# Patient Record
Sex: FEMALE | Race: OTHER | HISPANIC OR LATINO
[De-identification: names, ages, dates, MRNs, and addresses within clinical notes are randomized per-mention and may not be internally consistent; named-entity substitution may affect disease eponyms.]

---

## 2018-05-15 ENCOUNTER — RESULT REVIEW (OUTPATIENT)
Age: 48
End: 2018-05-15

## 2018-05-26 ENCOUNTER — RESULT REVIEW (OUTPATIENT)
Age: 48
End: 2018-05-26

## 2018-06-16 ENCOUNTER — RESULT REVIEW (OUTPATIENT)
Age: 48
End: 2018-06-16

## 2018-07-01 ENCOUNTER — INPATIENT (INPATIENT)
Facility: HOSPITAL | Age: 48
LOS: 2 days | Discharge: HOME HEALTH | End: 2018-07-04
Attending: INTERNAL MEDICINE | Admitting: INTERNAL MEDICINE
Payer: COMMERCIAL

## 2018-07-01 ENCOUNTER — TRANSCRIPTION ENCOUNTER (OUTPATIENT)
Age: 48
End: 2018-07-01

## 2018-07-01 VITALS
HEIGHT: 63 IN | RESPIRATION RATE: 17 BRPM | OXYGEN SATURATION: 100 % | WEIGHT: 160.06 LBS | TEMPERATURE: 98 F | HEART RATE: 77 BPM | DIASTOLIC BLOOD PRESSURE: 67 MMHG | SYSTOLIC BLOOD PRESSURE: 138 MMHG

## 2018-07-01 PROBLEM — Z00.00 ENCOUNTER FOR PREVENTIVE HEALTH EXAMINATION: Status: ACTIVE | Noted: 2018-07-01

## 2018-07-01 PROCEDURE — 99285 EMERGENCY DEPT VISIT HI MDM: CPT | Mod: 25

## 2018-07-01 RX ORDER — SODIUM CHLORIDE 9 MG/ML
1000 INJECTION INTRAMUSCULAR; INTRAVENOUS; SUBCUTANEOUS ONCE
Qty: 0 | Refills: 0 | Status: COMPLETED | OUTPATIENT
Start: 2018-07-01 | End: 2018-07-01

## 2018-07-01 NOTE — ED ADULT TRIAGE NOTE - CHIEF COMPLAINT QUOTE
"We were at a barbecue, I went to the bathroom and they said that I passed out, I don't remember what happened, they said that I hit my head but my head does not hurt but my foot does"

## 2018-07-01 NOTE — ED ADULT NURSE NOTE - OBJECTIVE STATEMENT
48yr old female present to ER 2/2 syncope.  Patient state "We were at a barbecue, I went to the bathroom and they said that I passed out, and I could'nt remember anything. now c/o of ankle pain

## 2018-07-02 DIAGNOSIS — S82.864A NONDISPLACED MAISONNEUVE'S FRACTURE OF RIGHT LEG, INITIAL ENCOUNTER FOR CLOSED FRACTURE: ICD-10-CM

## 2018-07-02 DIAGNOSIS — D86.9 SARCOIDOSIS, UNSPECIFIED: ICD-10-CM

## 2018-07-02 DIAGNOSIS — Z29.9 ENCOUNTER FOR PROPHYLACTIC MEASURES, UNSPECIFIED: ICD-10-CM

## 2018-07-02 DIAGNOSIS — Z98.890 OTHER SPECIFIED POSTPROCEDURAL STATES: Chronic | ICD-10-CM

## 2018-07-02 DIAGNOSIS — R55 SYNCOPE AND COLLAPSE: ICD-10-CM

## 2018-07-02 DIAGNOSIS — D50.9 IRON DEFICIENCY ANEMIA, UNSPECIFIED: ICD-10-CM

## 2018-07-02 LAB
ABO RH CONFIRMATION: SIGNIFICANT CHANGE UP
ALBUMIN SERPL ELPH-MCNC: 3.3 G/DL — SIGNIFICANT CHANGE UP (ref 3.3–5)
ALBUMIN SERPL ELPH-MCNC: 3.7 G/DL — SIGNIFICANT CHANGE UP (ref 3.3–5)
ALP SERPL-CCNC: 81 U/L — SIGNIFICANT CHANGE UP (ref 40–120)
ALP SERPL-CCNC: 94 U/L — SIGNIFICANT CHANGE UP (ref 40–120)
ALT FLD-CCNC: 14 U/L — SIGNIFICANT CHANGE UP (ref 12–78)
ALT FLD-CCNC: 17 U/L — SIGNIFICANT CHANGE UP (ref 12–78)
ANION GAP SERPL CALC-SCNC: 10 MMOL/L — SIGNIFICANT CHANGE UP (ref 5–17)
ANION GAP SERPL CALC-SCNC: 7 MMOL/L — SIGNIFICANT CHANGE UP (ref 5–17)
ANION GAP SERPL CALC-SCNC: 9 MMOL/L — SIGNIFICANT CHANGE UP (ref 5–17)
ANISOCYTOSIS BLD QL: SLIGHT — SIGNIFICANT CHANGE UP
APPEARANCE UR: ABNORMAL
APTT BLD: 26.1 SEC — LOW (ref 27.5–37.4)
APTT BLD: 26.8 SEC — LOW (ref 27.5–37.4)
AST SERPL-CCNC: 17 U/L — SIGNIFICANT CHANGE UP (ref 15–37)
AST SERPL-CCNC: 19 U/L — SIGNIFICANT CHANGE UP (ref 15–37)
BACTERIA # UR AUTO: ABNORMAL
BASOPHILS # BLD AUTO: 0 K/UL — SIGNIFICANT CHANGE UP (ref 0–0.2)
BASOPHILS # BLD AUTO: 0.02 K/UL — SIGNIFICANT CHANGE UP (ref 0–0.2)
BASOPHILS NFR BLD AUTO: 0 % — SIGNIFICANT CHANGE UP (ref 0–2)
BASOPHILS NFR BLD AUTO: 0.2 % — SIGNIFICANT CHANGE UP (ref 0–2)
BILIRUB SERPL-MCNC: 0.3 MG/DL — SIGNIFICANT CHANGE UP (ref 0.2–1.2)
BILIRUB SERPL-MCNC: 0.4 MG/DL — SIGNIFICANT CHANGE UP (ref 0.2–1.2)
BILIRUB UR-MCNC: NEGATIVE — SIGNIFICANT CHANGE UP
BLD GP AB SCN SERPL QL: SIGNIFICANT CHANGE UP
BUN SERPL-MCNC: 11 MG/DL — SIGNIFICANT CHANGE UP (ref 7–23)
CALCIUM SERPL-MCNC: 8.2 MG/DL — LOW (ref 8.5–10.1)
CALCIUM SERPL-MCNC: 8.2 MG/DL — LOW (ref 8.5–10.1)
CALCIUM SERPL-MCNC: 9 MG/DL — SIGNIFICANT CHANGE UP (ref 8.5–10.1)
CHLORIDE SERPL-SCNC: 110 MMOL/L — HIGH (ref 96–108)
CHLORIDE SERPL-SCNC: 113 MMOL/L — HIGH (ref 96–108)
CHLORIDE SERPL-SCNC: 115 MMOL/L — HIGH (ref 96–108)
CK MB BLD-MCNC: 0.8 % — SIGNIFICANT CHANGE UP (ref 0–3.5)
CK MB BLD-MCNC: 0.8 % — SIGNIFICANT CHANGE UP (ref 0–3.5)
CK MB CFR SERPL CALC: 0.9 NG/ML — SIGNIFICANT CHANGE UP (ref 0.5–3.6)
CK MB CFR SERPL CALC: 1.1 NG/ML — SIGNIFICANT CHANGE UP (ref 0.5–3.6)
CK MB CFR SERPL CALC: 1.1 NG/ML — SIGNIFICANT CHANGE UP (ref 0.5–3.6)
CK SERPL-CCNC: 137 U/L — SIGNIFICANT CHANGE UP (ref 26–192)
CK SERPL-CCNC: 142 U/L — SIGNIFICANT CHANGE UP (ref 26–192)
CO2 SERPL-SCNC: 22 MMOL/L — SIGNIFICANT CHANGE UP (ref 22–31)
CO2 SERPL-SCNC: 22 MMOL/L — SIGNIFICANT CHANGE UP (ref 22–31)
CO2 SERPL-SCNC: 23 MMOL/L — SIGNIFICANT CHANGE UP (ref 22–31)
COLOR SPEC: YELLOW — SIGNIFICANT CHANGE UP
CREAT SERPL-MCNC: 0.84 MG/DL — SIGNIFICANT CHANGE UP (ref 0.5–1.3)
CREAT SERPL-MCNC: 1.02 MG/DL — SIGNIFICANT CHANGE UP (ref 0.5–1.3)
CREAT SERPL-MCNC: 1.16 MG/DL — SIGNIFICANT CHANGE UP (ref 0.5–1.3)
CRP SERPL-MCNC: 0.2 MG/DL — SIGNIFICANT CHANGE UP (ref 0–0.4)
DIFF PNL FLD: NEGATIVE — SIGNIFICANT CHANGE UP
EOSINOPHIL # BLD AUTO: 0 K/UL — SIGNIFICANT CHANGE UP (ref 0–0.5)
EOSINOPHIL # BLD AUTO: 0.03 K/UL — SIGNIFICANT CHANGE UP (ref 0–0.5)
EOSINOPHIL NFR BLD AUTO: 0 % — SIGNIFICANT CHANGE UP (ref 0–6)
EOSINOPHIL NFR BLD AUTO: 0.3 % — SIGNIFICANT CHANGE UP (ref 0–6)
EPI CELLS # UR: SIGNIFICANT CHANGE UP
GLUCOSE SERPL-MCNC: 109 MG/DL — HIGH (ref 70–99)
GLUCOSE SERPL-MCNC: 94 MG/DL — SIGNIFICANT CHANGE UP (ref 70–99)
GLUCOSE SERPL-MCNC: 96 MG/DL — SIGNIFICANT CHANGE UP (ref 70–99)
GLUCOSE UR QL: NEGATIVE MG/DL — SIGNIFICANT CHANGE UP
HCG SERPL-ACNC: <1 MIU/ML — SIGNIFICANT CHANGE UP
HCT VFR BLD CALC: 31.2 % — LOW (ref 34.5–45)
HCT VFR BLD CALC: 31.8 % — LOW (ref 34.5–45)
HCT VFR BLD CALC: 34.3 % — LOW (ref 34.5–45)
HGB BLD-MCNC: 8.6 G/DL — LOW (ref 11.5–15.5)
HGB BLD-MCNC: 8.7 G/DL — LOW (ref 11.5–15.5)
HGB BLD-MCNC: 9.5 G/DL — LOW (ref 11.5–15.5)
HYPOCHROMIA BLD QL: SLIGHT — SIGNIFICANT CHANGE UP
IMM GRANULOCYTES NFR BLD AUTO: 0.2 % — SIGNIFICANT CHANGE UP (ref 0–1.5)
INR BLD: 1.04 RATIO — SIGNIFICANT CHANGE UP (ref 0.88–1.16)
INR BLD: 1.09 RATIO — SIGNIFICANT CHANGE UP (ref 0.88–1.16)
KETONES UR-MCNC: NEGATIVE — SIGNIFICANT CHANGE UP
LEUKOCYTE ESTERASE UR-ACNC: ABNORMAL
LYMPHOCYTES # BLD AUTO: 1.01 K/UL — SIGNIFICANT CHANGE UP (ref 1–3.3)
LYMPHOCYTES # BLD AUTO: 10 % — LOW (ref 13–44)
LYMPHOCYTES # BLD AUTO: 2.13 K/UL — SIGNIFICANT CHANGE UP (ref 1–3.3)
LYMPHOCYTES # BLD AUTO: 23.7 % — SIGNIFICANT CHANGE UP (ref 13–44)
MAGNESIUM SERPL-MCNC: 2.3 MG/DL — SIGNIFICANT CHANGE UP (ref 1.6–2.6)
MANUAL SMEAR VERIFICATION: YES — SIGNIFICANT CHANGE UP
MCHC RBC-ENTMCNC: 20 PG — LOW (ref 27–34)
MCHC RBC-ENTMCNC: 20 PG — LOW (ref 27–34)
MCHC RBC-ENTMCNC: 20.1 PG — LOW (ref 27–34)
MCHC RBC-ENTMCNC: 27.4 GM/DL — LOW (ref 32–36)
MCHC RBC-ENTMCNC: 27.6 GM/DL — LOW (ref 32–36)
MCHC RBC-ENTMCNC: 27.7 GM/DL — LOW (ref 32–36)
MCV RBC AUTO: 72.4 FL — LOW (ref 80–100)
MCV RBC AUTO: 72.6 FL — LOW (ref 80–100)
MCV RBC AUTO: 73.4 FL — LOW (ref 80–100)
MONOCYTES # BLD AUTO: 0 K/UL — SIGNIFICANT CHANGE UP (ref 0–0.9)
MONOCYTES # BLD AUTO: 0.75 K/UL — SIGNIFICANT CHANGE UP (ref 0–0.9)
MONOCYTES NFR BLD AUTO: 0 % — LOW (ref 2–14)
MONOCYTES NFR BLD AUTO: 8.4 % — SIGNIFICANT CHANGE UP (ref 2–14)
NEUTROPHILS # BLD AUTO: 6.03 K/UL — SIGNIFICANT CHANGE UP (ref 1.8–7.4)
NEUTROPHILS # BLD AUTO: 9.13 K/UL — HIGH (ref 1.8–7.4)
NEUTROPHILS NFR BLD AUTO: 67.2 % — SIGNIFICANT CHANGE UP (ref 43–77)
NEUTROPHILS NFR BLD AUTO: 90 % — HIGH (ref 43–77)
NITRITE UR-MCNC: NEGATIVE — SIGNIFICANT CHANGE UP
NRBC # BLD: 0 /100 WBCS — SIGNIFICANT CHANGE UP (ref 0–0)
NRBC # BLD: 0 /100 — SIGNIFICANT CHANGE UP (ref 0–0)
NRBC # BLD: SIGNIFICANT CHANGE UP /100 WBCS (ref 0–0)
PH UR: 5 — SIGNIFICANT CHANGE UP (ref 5–8)
PLAT MORPH BLD: NORMAL — SIGNIFICANT CHANGE UP
PLATELET # BLD AUTO: 273 K/UL — SIGNIFICANT CHANGE UP (ref 150–400)
PLATELET # BLD AUTO: 281 K/UL — SIGNIFICANT CHANGE UP (ref 150–400)
PLATELET # BLD AUTO: 317 K/UL — SIGNIFICANT CHANGE UP (ref 150–400)
POTASSIUM SERPL-MCNC: 4.2 MMOL/L — SIGNIFICANT CHANGE UP (ref 3.5–5.3)
POTASSIUM SERPL-SCNC: 4.2 MMOL/L — SIGNIFICANT CHANGE UP (ref 3.5–5.3)
PROT SERPL-MCNC: 6.8 GM/DL — SIGNIFICANT CHANGE UP (ref 6–8.3)
PROT SERPL-MCNC: 7.7 GM/DL — SIGNIFICANT CHANGE UP (ref 6–8.3)
PROT UR-MCNC: NEGATIVE MG/DL — SIGNIFICANT CHANGE UP
PROTHROM AB SERPL-ACNC: 11.4 SEC — SIGNIFICANT CHANGE UP (ref 9.8–12.7)
PROTHROM AB SERPL-ACNC: 11.9 SEC — SIGNIFICANT CHANGE UP (ref 9.8–12.7)
RBC # BLD: 4.3 M/UL — SIGNIFICANT CHANGE UP (ref 3.8–5.2)
RBC # BLD: 4.33 M/UL — SIGNIFICANT CHANGE UP (ref 3.8–5.2)
RBC # BLD: 4.74 M/UL — SIGNIFICANT CHANGE UP (ref 3.8–5.2)
RBC # FLD: 19.6 % — HIGH (ref 10.3–14.5)
RBC # FLD: 19.6 % — HIGH (ref 10.3–14.5)
RBC # FLD: 19.7 % — HIGH (ref 10.3–14.5)
RBC BLD AUTO: ABNORMAL
SODIUM SERPL-SCNC: 142 MMOL/L — SIGNIFICANT CHANGE UP (ref 135–145)
SODIUM SERPL-SCNC: 144 MMOL/L — SIGNIFICANT CHANGE UP (ref 135–145)
SODIUM SERPL-SCNC: 145 MMOL/L — SIGNIFICANT CHANGE UP (ref 135–145)
SP GR SPEC: 1.01 — SIGNIFICANT CHANGE UP (ref 1.01–1.02)
TROPONIN I SERPL-MCNC: <.015 NG/ML — SIGNIFICANT CHANGE UP (ref 0.01–0.04)
UROBILINOGEN FLD QL: NEGATIVE MG/DL — SIGNIFICANT CHANGE UP
WBC # BLD: 10.14 K/UL — SIGNIFICANT CHANGE UP (ref 3.8–10.5)
WBC # BLD: 8.98 K/UL — SIGNIFICANT CHANGE UP (ref 3.8–10.5)
WBC # BLD: 9.83 K/UL — SIGNIFICANT CHANGE UP (ref 3.8–10.5)
WBC # FLD AUTO: 10.14 K/UL — SIGNIFICANT CHANGE UP (ref 3.8–10.5)
WBC # FLD AUTO: 8.98 K/UL — SIGNIFICANT CHANGE UP (ref 3.8–10.5)
WBC # FLD AUTO: 9.83 K/UL — SIGNIFICANT CHANGE UP (ref 3.8–10.5)
WBC UR QL: SIGNIFICANT CHANGE UP

## 2018-07-02 PROCEDURE — 73610 X-RAY EXAM OF ANKLE: CPT | Mod: 26,77,RT

## 2018-07-02 PROCEDURE — 93306 TTE W/DOPPLER COMPLETE: CPT | Mod: 26

## 2018-07-02 PROCEDURE — 73600 X-RAY EXAM OF ANKLE: CPT | Mod: 26,59,RT

## 2018-07-02 PROCEDURE — 99223 1ST HOSP IP/OBS HIGH 75: CPT

## 2018-07-02 PROCEDURE — 73590 X-RAY EXAM OF LOWER LEG: CPT | Mod: 26,RT

## 2018-07-02 PROCEDURE — 73610 X-RAY EXAM OF ANKLE: CPT | Mod: 26,RT

## 2018-07-02 PROCEDURE — 70450 CT HEAD/BRAIN W/O DYE: CPT | Mod: 26

## 2018-07-02 PROCEDURE — 73700 CT LOWER EXTREMITY W/O DYE: CPT | Mod: 26,RT

## 2018-07-02 PROCEDURE — 76377 3D RENDER W/INTRP POSTPROCES: CPT | Mod: 26

## 2018-07-02 PROCEDURE — 73562 X-RAY EXAM OF KNEE 3: CPT | Mod: 26,RT

## 2018-07-02 PROCEDURE — 93010 ELECTROCARDIOGRAM REPORT: CPT

## 2018-07-02 PROCEDURE — 71045 X-RAY EXAM CHEST 1 VIEW: CPT | Mod: 26

## 2018-07-02 RX ORDER — OXYCODONE HYDROCHLORIDE 5 MG/1
5 TABLET ORAL EVERY 4 HOURS
Qty: 0 | Refills: 0 | Status: DISCONTINUED | OUTPATIENT
Start: 2018-07-02 | End: 2018-07-02

## 2018-07-02 RX ORDER — KETOROLAC TROMETHAMINE 30 MG/ML
30 SYRINGE (ML) INJECTION ONCE
Qty: 0 | Refills: 0 | Status: DISCONTINUED | OUTPATIENT
Start: 2018-07-02 | End: 2018-07-02

## 2018-07-02 RX ORDER — FERROUS SULFATE 325(65) MG
1 TABLET ORAL
Qty: 0 | Refills: 0 | COMMUNITY

## 2018-07-02 RX ORDER — SODIUM CHLORIDE 9 MG/ML
1000 INJECTION, SOLUTION INTRAVENOUS
Qty: 0 | Refills: 0 | Status: DISCONTINUED | OUTPATIENT
Start: 2018-07-02 | End: 2018-07-02

## 2018-07-02 RX ORDER — ASPIRIN/CALCIUM CARB/MAGNESIUM 324 MG
325 TABLET ORAL DAILY
Qty: 0 | Refills: 0 | Status: DISCONTINUED | OUTPATIENT
Start: 2018-07-03 | End: 2018-07-04

## 2018-07-02 RX ORDER — ACETAMINOPHEN 500 MG
650 TABLET ORAL EVERY 6 HOURS
Qty: 0 | Refills: 0 | Status: DISCONTINUED | OUTPATIENT
Start: 2018-07-03 | End: 2018-07-04

## 2018-07-02 RX ORDER — FERROUS SULFATE 325(65) MG
325 TABLET ORAL
Qty: 0 | Refills: 0 | Status: DISCONTINUED | OUTPATIENT
Start: 2018-07-03 | End: 2018-07-03

## 2018-07-02 RX ORDER — DIPHENHYDRAMINE HCL 50 MG
25 CAPSULE ORAL AT BEDTIME
Qty: 0 | Refills: 0 | Status: DISCONTINUED | OUTPATIENT
Start: 2018-07-03 | End: 2018-07-04

## 2018-07-02 RX ORDER — DOCUSATE SODIUM 100 MG
100 CAPSULE ORAL THREE TIMES A DAY
Qty: 0 | Refills: 0 | Status: DISCONTINUED | OUTPATIENT
Start: 2018-07-03 | End: 2018-07-04

## 2018-07-02 RX ORDER — OXYCODONE HYDROCHLORIDE 5 MG/1
10 TABLET ORAL EVERY 4 HOURS
Qty: 0 | Refills: 0 | Status: DISCONTINUED | OUTPATIENT
Start: 2018-07-02 | End: 2018-07-02

## 2018-07-02 RX ORDER — SODIUM CHLORIDE 9 MG/ML
1000 INJECTION INTRAMUSCULAR; INTRAVENOUS; SUBCUTANEOUS
Qty: 0 | Refills: 0 | Status: DISCONTINUED | OUTPATIENT
Start: 2018-07-02 | End: 2018-07-02

## 2018-07-02 RX ORDER — FAMOTIDINE 10 MG/ML
20 INJECTION INTRAVENOUS EVERY 12 HOURS
Qty: 0 | Refills: 0 | Status: DISCONTINUED | OUTPATIENT
Start: 2018-07-03 | End: 2018-07-04

## 2018-07-02 RX ORDER — FERROUS SULFATE 325(65) MG
325 TABLET ORAL DAILY
Qty: 0 | Refills: 0 | Status: DISCONTINUED | OUTPATIENT
Start: 2018-07-02 | End: 2018-07-02

## 2018-07-02 RX ORDER — ASCORBIC ACID 60 MG
500 TABLET,CHEWABLE ORAL
Qty: 0 | Refills: 0 | Status: DISCONTINUED | OUTPATIENT
Start: 2018-07-03 | End: 2018-07-04

## 2018-07-02 RX ORDER — SODIUM CHLORIDE 9 MG/ML
1000 INJECTION, SOLUTION INTRAVENOUS
Qty: 0 | Refills: 0 | Status: DISCONTINUED | OUTPATIENT
Start: 2018-07-04 | End: 2018-07-04

## 2018-07-02 RX ORDER — ACETAMINOPHEN 500 MG
1000 TABLET ORAL ONCE
Qty: 0 | Refills: 0 | Status: COMPLETED | OUTPATIENT
Start: 2018-07-02 | End: 2018-07-02

## 2018-07-02 RX ORDER — MORPHINE SULFATE 50 MG/1
4 CAPSULE, EXTENDED RELEASE ORAL EVERY 4 HOURS
Qty: 0 | Refills: 0 | Status: DISCONTINUED | OUTPATIENT
Start: 2018-07-02 | End: 2018-07-02

## 2018-07-02 RX ORDER — CEFAZOLIN SODIUM 1 G
2000 VIAL (EA) INJECTION EVERY 8 HOURS
Qty: 0 | Refills: 0 | Status: COMPLETED | OUTPATIENT
Start: 2018-07-02 | End: 2018-07-03

## 2018-07-02 RX ORDER — ONDANSETRON 8 MG/1
4 TABLET, FILM COATED ORAL EVERY 6 HOURS
Qty: 0 | Refills: 0 | Status: DISCONTINUED | OUTPATIENT
Start: 2018-07-03 | End: 2018-07-04

## 2018-07-02 RX ORDER — MAGNESIUM HYDROXIDE 400 MG/1
30 TABLET, CHEWABLE ORAL DAILY
Qty: 0 | Refills: 0 | Status: DISCONTINUED | OUTPATIENT
Start: 2018-07-03 | End: 2018-07-04

## 2018-07-02 RX ORDER — OXYCODONE HYDROCHLORIDE 5 MG/1
10 TABLET ORAL EVERY 4 HOURS
Qty: 0 | Refills: 0 | Status: DISCONTINUED | OUTPATIENT
Start: 2018-07-03 | End: 2018-07-04

## 2018-07-02 RX ORDER — FOLIC ACID 0.8 MG
1 TABLET ORAL DAILY
Qty: 0 | Refills: 0 | Status: DISCONTINUED | OUTPATIENT
Start: 2018-07-03 | End: 2018-07-04

## 2018-07-02 RX ORDER — LANOLIN ALCOHOL/MO/W.PET/CERES
3 CREAM (GRAM) TOPICAL AT BEDTIME
Qty: 0 | Refills: 0 | Status: DISCONTINUED | OUTPATIENT
Start: 2018-07-03 | End: 2018-07-04

## 2018-07-02 RX ORDER — MORPHINE SULFATE 50 MG/1
4 CAPSULE, EXTENDED RELEASE ORAL
Qty: 0 | Refills: 0 | Status: DISCONTINUED | OUTPATIENT
Start: 2018-07-02 | End: 2018-07-02

## 2018-07-02 RX ORDER — MORPHINE SULFATE 50 MG/1
4 CAPSULE, EXTENDED RELEASE ORAL ONCE
Qty: 0 | Refills: 0 | Status: DISCONTINUED | OUTPATIENT
Start: 2018-07-02 | End: 2018-07-02

## 2018-07-02 RX ORDER — OXYCODONE HYDROCHLORIDE 5 MG/1
5 TABLET ORAL EVERY 4 HOURS
Qty: 0 | Refills: 0 | Status: DISCONTINUED | OUTPATIENT
Start: 2018-07-03 | End: 2018-07-04

## 2018-07-02 RX ADMIN — Medication 325 MILLIGRAM(S): at 11:18

## 2018-07-02 RX ADMIN — Medication 30 MILLIGRAM(S): at 01:12

## 2018-07-02 RX ADMIN — MORPHINE SULFATE 4 MILLIGRAM(S): 50 CAPSULE, EXTENDED RELEASE ORAL at 16:47

## 2018-07-02 RX ADMIN — MORPHINE SULFATE 4 MILLIGRAM(S): 50 CAPSULE, EXTENDED RELEASE ORAL at 12:00

## 2018-07-02 RX ADMIN — Medication 400 MILLIGRAM(S): at 20:37

## 2018-07-02 RX ADMIN — SODIUM CHLORIDE 1000 MILLILITER(S): 9 INJECTION INTRAMUSCULAR; INTRAVENOUS; SUBCUTANEOUS at 01:12

## 2018-07-02 RX ADMIN — SODIUM CHLORIDE 125 MILLILITER(S): 9 INJECTION, SOLUTION INTRAVENOUS at 20:37

## 2018-07-02 RX ADMIN — MORPHINE SULFATE 4 MILLIGRAM(S): 50 CAPSULE, EXTENDED RELEASE ORAL at 01:13

## 2018-07-02 RX ADMIN — SODIUM CHLORIDE 80 MILLILITER(S): 9 INJECTION INTRAMUSCULAR; INTRAVENOUS; SUBCUTANEOUS at 04:48

## 2018-07-02 RX ADMIN — MORPHINE SULFATE 4 MILLIGRAM(S): 50 CAPSULE, EXTENDED RELEASE ORAL at 17:10

## 2018-07-02 RX ADMIN — MORPHINE SULFATE 4 MILLIGRAM(S): 50 CAPSULE, EXTENDED RELEASE ORAL at 01:56

## 2018-07-02 RX ADMIN — Medication 30 MILLIGRAM(S): at 01:56

## 2018-07-02 RX ADMIN — MORPHINE SULFATE 4 MILLIGRAM(S): 50 CAPSULE, EXTENDED RELEASE ORAL at 04:44

## 2018-07-02 RX ADMIN — Medication 1000 MILLIGRAM(S): at 20:37

## 2018-07-02 RX ADMIN — MORPHINE SULFATE 4 MILLIGRAM(S): 50 CAPSULE, EXTENDED RELEASE ORAL at 05:49

## 2018-07-02 RX ADMIN — MORPHINE SULFATE 4 MILLIGRAM(S): 50 CAPSULE, EXTENDED RELEASE ORAL at 11:17

## 2018-07-02 RX ADMIN — Medication 100 MILLIGRAM(S): at 22:35

## 2018-07-02 NOTE — H&P ADULT - ASSESSMENT
48 year old woman with PMH sarcoidosis and Fe def anemia presents with complaint of Syncope.  Pt will require admission as detailed below:    IMPROVE VTE Individual Risk Assessment          RISK                                                          Points    [  ] Previous VTE                                                3    [  ] Thrombophilia                                             2    [ x ] Lower limb paralysis                                    2        (unable to hold up >15 seconds)      [  ] Current Cancer                                             2         (within 6 months)    [ x ] Immobilization > 24 hrs                              1    [  ] ICU/CCU stay > 24 hours                            1    [  ] Age > 60                                                    1    IMPROVE VTE Score ____2_____

## 2018-07-02 NOTE — H&P ADULT - NSHPLABSRESULTS_GEN_ALL_CORE
Vital Signs Last 24 Hrs  T(C): 36.4 (2018 22:31), Max: 36.4 (2018 22:31)  T(F): 97.5 (2018 22:), Max: 97.5 (2018 22:)  HR: 77 (:) (77 - 77)  BP: 138/67 (:) (138/67 - 138/67)  BP(mean): --  RR: 17 (:) (17 - 17)  SpO2: 100% (:) (100% - 100%)        LABS:                        9.5    10.14 )-----------( 317      ( 2018 01:08 )             34.3     07-02    142  |  110<H>  |  11  ----------------------------<  109<H>  4.2   |  22  |  1.16    Ca    9.0      2018 01:08  Mg     2.3     07-02    TPro  7.7  /  Alb  3.7  /  TBili  0.3  /  DBili  x   /  AST  17  /  ALT  17  /  AlkPhos  94  07-02    PT/INR - ( 2018 01:08 )   PT: 11.4 sec;   INR: 1.04 ratio         PTT - ( 2018 01:08 )  PTT:26.8 sec  Urinalysis Basic - ( 2018 01:08 )    Color: Yellow / Appearance: Slightly Turbid / S.010 / pH: x  Gluc: x / Ketone: Negative  / Bili: Negative / Urobili: Negative mg/dL   Blood: x / Protein: Negative mg/dL / Nitrite: Negative   Leuk Esterase: Small / RBC: x / WBC 0-2   Sq Epi: x / Non Sq Epi: Few / Bacteria: Moderate        RADIOLOGY & ADDITIONAL STUDIES:    CT head read:  IMPRESSION:  No acute findings on noncontrast CT of the brain.If clinically indicated,   short-term follow-up or MRI may be obtained for further evaluation   provided no MR contraindications.

## 2018-07-02 NOTE — ED ADULT NURSE REASSESSMENT NOTE - NS ED NURSE REASSESS COMMENT FT1
0700, pt received awake, alert, orient x 3, no c/o at this time. right foot with cast elevated noted. pt denies pain. pt at echo at this time. pending OR. ongoing assess

## 2018-07-02 NOTE — CONSULT NOTE ADULT - SUBJECTIVE AND OBJECTIVE BOX
CARDIOLOGY CONSULT NOTE    07-02-18 @ 07:37  YANI ALVARADO is a 48y Female with a known history of :  Preventive measure (Z29.9)  Iron deficiency anemia, unspecified iron deficiency anemia type (D50.9)  Sarcoidosis (D86.9)  Closed nondisplaced Maisonneuve fracture of right lower extremity, initial encounter (S82.200C)  Syncope, unspecified syncope type (R55)    HPI:  48 year old woman with PMH sarcoidosis and Fe def anemia presents with complaint of Syncope.  As per patient, she was out with friends and was walking to the bathroom and cannot remember anything else.  The next thing she remembers is that people were trying to make her drink orange juice.  There were no reported tremors, slurred speech, facial droop, incontinence, or confusion by witnesses.  She denies any symptoms prior to or after the episode except for RLE pain afterwards.  She denies lightheadedness, dizziness, visual changes, headache, chest pain, palpitations, SOB, weakness, numbness, tingling, visual changes, hearing changes; states "I felt completely normal both before and after I passed out".  She states her sarcoidosis was diagnosed in 1996 and she was on steroids for ~1 year; her only symptom was visual changes.  She remembers it was diagnosed by biopsy and says "I think that was it" when asked about non-caseating granulomas in the biopsy.  Her only current complaint is RLE pain.    In the ED, EKG NSR, imaging revealed R bimalleolar equivalent ankle fracture with proximal fibula fracture, CT head unremarkable, labs consistent with Hx of Fe def anemia, cardiac enzymes normal. (02 Jul 2018 02:41)      REVIEW OF SYSTEMS:    CONSTITUTIONAL: No fever, weight loss, or fatigue  EYES: No eye pain, visual disturbances, or discharge  ENMT:  No difficulty hearing, tinnitus, vertigo; No sinus or throat pain  NECK: No pain or stiffness  BREASTS: No pain, masses, or nipple discharge  RESPIRATORY: No cough, wheezing, chills or hemoptysis; No shortness of breath  CARDIOVASCULAR: No chest pain, palpitations, dizziness, or leg swelling  GASTROINTESTINAL: No abdominal or epigastric pain. No nausea, vomiting, or hematemesis; No diarrhea or constipation. No melena or hematochezia.  GENITOURINARY: No dysuria, frequency, hematuria, or incontinence  NEUROLOGICAL: No headaches, memory loss, loss of strength, numbness, or tremors  SKIN: No itching, burning, rashes, or lesions   LYMPH NODES: No enlarged glands  ENDOCRINE: No heat or cold intolerance; No hair loss  MUSCULOSKELETAL: No joint pain or swelling; No muscle, back, or extremity pain  PSYCHIATRIC: No depression, anxiety, mood swings, or difficulty sleeping  HEME/LYMPH: No easy bruising, or bleeding gums  ALLERGY AND IMMUNOLOGIC: No hives or eczema    MEDICATIONS  (STANDING):  ferrous    sulfate 325 milliGRAM(s) Oral daily  sodium chloride 0.9%. 1000 milliLiter(s) (80 mL/Hr) IV Continuous <Continuous>    MEDICATIONS  (PRN):  morphine  - Injectable 4 milliGRAM(s) IV Push every 4 hours PRN Severe Pain (7 - 10)  oxyCODONE    IR 5 milliGRAM(s) Oral every 4 hours PRN Mild Pain (1 - 3)  oxyCODONE    IR 10 milliGRAM(s) Oral every 4 hours PRN Moderate Pain (4 - 6)    ferrous sulfate 325 mg (65 mg elemental iron) oral tablet: 1 tab(s) orally every other day (at bedtime)    ALLERGIES: No Known Allergies      FAMILY HISTORY: FAMILY HISTORY:  No pertinent family history in first degree relatives      PHYSICAL EXAMINATION:  -----------------------------  T(C): 36.6 (07-02-18 @ 05:49), Max: 36.6 (07-02-18 @ 05:49)  HR: 58 (07-02-18 @ 05:49) (58 - 77)  BP: 160/78 (07-02-18 @ 05:49) (138/67 - 160/78)  RR: 17 (07-02-18 @ 05:49) (17 - 17)  SpO2: 100% (07-02-18 @ 05:49) (100% - 100%)  Wt(kg): --    Height (cm): 160.02 (07-01 @ 22:31)  Weight (kg): 72.6 (07-01 @ 22:31)  BMI (kg/m2): 28.4 (07-01 @ 22:31)  BSA (m2): 1.76 (07-01 @ 22:31)    Constitutional: well developed, normal appearance, well groomed, well nourished, no deformities and no acute distress.   Eyes: the conjunctiva exhibited no abnormalities and the eyelids demonstrated no xanthelasmas.   HEENT: normal oral mucosa, no oral pallor and no oral cyanosis.   Neck: normal jugular venous A waves present, normal jugular venous V waves present and no jugular venous worley A waves.   Pulmonary: no respiratory distress, normal respiratory rhythm and effort, no accessory muscle use and lungs were clear to auscultation bilaterally.   Cardiovascular: heart rate and rhythm were normal, normal S1 and S2 and no murmur, gallop, rub, heave or thrill are present.   Abdomen: soft, non-tender, no hepato-splenomegaly and no abdominal mass palpated.   Musculoskeletal: the gait could not be assessed..   Extremities: no clubbing of the fingernails, no localized cyanosis, no petechial hemorrhages and no ischemic changes.   Skin: normal skin color and pigmentation, no rash, no venous stasis, no skin lesions, no skin ulcer and no xanthoma was observed.   Psychiatric: oriented to person, place, and time, the affect was normal, the mood was normal and not feeling anxious.     LABS:   --------  07-02    145  |  113<H>  |  11  ----------------------------<  96  4.2   |  23  |  1.02    Ca    8.2<L>      02 Jul 2018 05:42  Mg     2.3     07-02    TPro  7.7  /  Alb  3.7  /  TBili  0.3  /  DBili  x   /  AST  17  /  ALT  17  /  AlkPhos  94  07-02                         8.6    9.83  )-----------( 281      ( 02 Jul 2018 05:42 )             31.2     PT/INR - ( 02 Jul 2018 05:42 )   PT: 11.9 sec;   INR: 1.09 ratio         PTT - ( 02 Jul 2018 05:42 )  PTT:26.1 sec    07-02 @ 01:08 CPK total:--, CKMB --, Troponin I - <.015 ng/mL        CARDIAC MARKERS ( 02 Jul 2018 01:08 )  <.015 ng/mL / x     / x     / x     / 0.9 ng/mL        RADIOLOGY:  -----------------        ECG: CARDIOLOGY CONSULT NOTE    07-02-18 @ 07:37  YANI ALVARADO is a 48y Female with a known history of :  Iron deficiency anemia, unspecified iron deficiency anemia type (D50.9)  Sarcoidosis (D86.9)  Closed nondisplaced Maisonneuve fracture of right lower extremity, initial encounter (S82.416E)  Syncope, unspecified syncope type (R55)    HPI:  48 year old woman with PMH sarcoidosis and Fe def anemia presents with complaint of Syncope.    As per patient, she was out with friends and was walking to the bathroom and cannot remember anything else.  The next thing she remembers is that people were trying to make her drink orange juice.  There were no reported tremors, slurred speech, facial droop, incontinence, or confusion by witnesses.  She denies any symptoms prior to or after the episode except for RLE pain afterwards.  She denies lightheadedness, dizziness, visual changes, headache, chest pain, palpitations, SOB, weakness, numbness, tingling, visual changes, hearing changes; states "I felt completely normal both before and after I passed out".  She states her sarcoidosis was diagnosed in 1996 and she was on steroids for ~1 year; her only symptom was visual changes.  She remembers it was diagnosed by biopsy and says "I think that was it" when asked about non-caseating granulomas in the biopsy.  Her only current complaint is RLE pain.    In the ED, EKG NSR, imaging revealed R bimalleolar equivalent ankle fracture with proximal fibula fracture, CT head unremarkable, labs consistent with Hx of Fe def anemia, cardiac enzymes normal. (02 Jul 2018 02:41)  Works as a nurse. Ex-smoker. No other constitutional symptoms.    REVIEW OF SYSTEMS:    CONSTITUTIONAL: No fever, weight loss, or fatigue  EYES: No eye pain, visual disturbances, or discharge  ENMT:  No difficulty hearing, tinnitus, vertigo; No sinus or throat pain  NECK: No pain or stiffness  BREASTS: No pain, masses, or nipple discharge  RESPIRATORY: No cough, wheezing, chills or hemoptysis; No shortness of breath  CARDIOVASCULAR: No chest pain, palpitations, dizziness, or leg swelling  GASTROINTESTINAL: No abdominal or epigastric pain. No nausea, vomiting, or hematemesis; No diarrhea or constipation. No melena or hematochezia.  GENITOURINARY: No dysuria, frequency, hematuria, or incontinence  NEUROLOGICAL: No headaches, memory loss, loss of strength, numbness, or tremors  SKIN: No itching, burning, rashes, or lesions   LYMPH NODES: No enlarged glands  ENDOCRINE: No heat or cold intolerance; No hair loss  MUSCULOSKELETAL: No joint pain or swelling; No muscle, back, or extremity pain  PSYCHIATRIC: No depression, anxiety, mood swings, or difficulty sleeping  HEME/LYMPH: No easy bruising, or bleeding gums  ALLERGY AND IMMUNOLOGIC: No hives or eczema    MEDICATIONS  (STANDING):  ferrous    sulfate 325 milliGRAM(s) Oral daily  sodium chloride 0.9%. 1000 milliLiter(s) (80 mL/Hr) IV Continuous <Continuous>    MEDICATIONS  (PRN):  morphine  - Injectable 4 milliGRAM(s) IV Push every 4 hours PRN Severe Pain (7 - 10)  oxyCODONE    IR 5 milliGRAM(s) Oral every 4 hours PRN Mild Pain (1 - 3)  oxyCODONE    IR 10 milliGRAM(s) Oral every 4 hours PRN Moderate Pain (4 - 6)    ferrous sulfate 325 mg (65 mg elemental iron) oral tablet: 1 tab(s) orally every other day (at bedtime)    ALLERGIES: No Known Allergies      FAMILY HISTORY: FAMILY HISTORY:  No pertinent family history in first degree relatives      PHYSICAL EXAMINATION:  -----------------------------  T(C): 36.6 (07-02-18 @ 05:49), Max: 36.6 (07-02-18 @ 05:49)  HR: 58 (07-02-18 @ 05:49) (58 - 77)  BP: 160/78 (07-02-18 @ 05:49) (138/67 - 160/78)  RR: 17 (07-02-18 @ 05:49) (17 - 17)  SpO2: 100% (07-02-18 @ 05:49) (100% - 100%)  Wt(kg): --    Height (cm): 160.02 (07-01 @ 22:31)  Weight (kg): 72.6 (07-01 @ 22:31)  BMI (kg/m2): 28.4 (07-01 @ 22:31)  BSA (m2): 1.76 (07-01 @ 22:31)    Constitutional: well developed, normal appearance, well groomed, well nourished, no deformities and no acute distress.   Eyes: the conjunctiva exhibited no abnormalities and the eyelids demonstrated no xanthelasmas.   HEENT: normal oral mucosa, no oral pallor and no oral cyanosis.   Neck: normal jugular venous A waves present, normal jugular venous V waves present and no jugular venous worley A waves.   Pulmonary: no respiratory distress, normal respiratory rhythm and effort, no accessory muscle use and lungs were clear to auscultation bilaterally.   Cardiovascular: heart rate and rhythm were normal, normal S1 and S2 and no murmur, gallop, rub, heave or thrill are present.   Abdomen: soft, non-tender, no hepato-splenomegaly and no abdominal mass palpated.   Musculoskeletal: the gait could not be assessed..   Extremities: no clubbing of the fingernails, no localized cyanosis, no petechial hemorrhages and no ischemic changes.   Skin: normal skin color and pigmentation, no rash, no venous stasis, no skin lesions, no skin ulcer and no xanthoma was observed.   Psychiatric: oriented to person, place, and time, the affect was normal, the mood was normal and not feeling anxious.     LABS:   --------  07-02    145  |  113<H>  |  11  ----------------------------<  96  4.2   |  23  |  1.02    Ca    8.2<L>      02 Jul 2018 05:42  Mg     2.3     07-02    TPro  7.7  /  Alb  3.7  /  TBili  0.3  /  DBili  x   /  AST  17  /  ALT  17  /  AlkPhos  94  07-02                         8.6    9.83  )-----------( 281      ( 02 Jul 2018 05:42 )             31.2     PT/INR - ( 02 Jul 2018 05:42 )   PT: 11.9 sec;   INR: 1.09 ratio         PTT - ( 02 Jul 2018 05:42 )  PTT:26.1 sec    07-02 @ 01:08 CPK total:--, CKMB --, Troponin I - <.015 ng/mL        CARDIAC MARKERS ( 02 Jul 2018 01:08 )  <.015 ng/mL / x     / x     / x     / 0.9 ng/mL        RADIOLOGY:  -----------------        ECG: NSR, PRWP

## 2018-07-02 NOTE — PROGRESS NOTE ADULT - SUBJECTIVE AND OBJECTIVE BOX
Ortho Post Op Check	    Pt seen & examined. Pain controlled. Tolerated procedure well.     Vital Signs Last 24 Hrs  T(C): 36.1 (02 Jul 2018 20:12), Max: 36.7 (02 Jul 2018 18:37)  T(F): 97 (02 Jul 2018 20:12), Max: 98 (02 Jul 2018 18:37)  HR: 74 (02 Jul 2018 20:42) (58 - 88)  BP: 112/74 (02 Jul 2018 20:42) (105/68 - 160/78)  BP(mean): --  RR: 16 (02 Jul 2018 20:42) (14 - 18)  SpO2: 99% (02 Jul 2018 20:42) (98% - 100%)      Gen: NAD  RLE:  Dressing clean D&I in Trilam Splint  +sensation to toes 1-5  +movement ot toes 1-5  foot warm and perfused with brisk cap refill   Soft compartments, - calf tenderness

## 2018-07-02 NOTE — CHART NOTE - NSCHARTNOTEFT_GEN_A_CORE
Pt admitted overnight with LE fracture, needs OR likely today.  Cardiology consult appreciated; will be medically clear pending results of TTE ordered by me for concern of cardiac sarcoid given remote history.  Clearance to follow.

## 2018-07-02 NOTE — H&P ADULT - PROBLEM SELECTOR PLAN 2
Pt can perform >4 METS, EKG, labs reviewed  As above, cardiac sarcoidosis needs to be considered given this patient's history and presentation, cannot clear patient for OR under GA at this time.  Medical optimization pending further cardiac evaluation in AM.  Please follow with medicine team/cardiology for clearance.

## 2018-07-02 NOTE — CONSULT NOTE ADULT - ATTENDING COMMENTS
I was present with the resident during the history and exam. I discussed the case with the resident and agree with the findings and plan as dicsumented in the resident's note.   The patient would benefit from surgery- Syndesmosis reconstruction and possible ORIF. I was present with the resident during the history and exam. I discussed the case with the resident and agree with the findings and plan as documented in the resident's note.   The patient would benefit from surgery- Syndesmosis reconstruction and possible ORIF.  Risks, benefits and alternatives discussed with the patient at length. Risks include bleeding, infection, malunion, nonunion, hardware failure, injury to nerves, vessels or tendons, pain, stiffness, limp, need for future surgery, loss of function, loss of limb, loss of life.  We discussed the operative plan and post op expectations.  The patient had the opportunity to ask questions. All questions were answered. The patient signed consent of their own accord.

## 2018-07-02 NOTE — H&P ADULT - NSHPOUTPATIENTPROVIDERS_GEN_ALL_CORE
Dr. Allen Peterson, pulm Dr. Allen Peterson, [facility], [phone]  Dr. Valeria Leon, PMD

## 2018-07-02 NOTE — ED PROVIDER NOTE - OBJECTIVE STATEMENT
Pertinent PMH/PSH/FHx/SHx and Review of Systems contained within:  48F hx of sarcoidosis that is in remission pw syncope. patient was with friends and was walking to the bathroom. The next thing she remembers she was sitting in a chair being fed juice. she complained of severe pain in the right foot and right knee. she has no ha, vision change, dizziness, cp, sob, abd pain, nausea or vomiting. she did not take anything for symptoms. no rash, bleeding or bruising  Fh and Sh not otherwise contributory  ROS otherwise negative

## 2018-07-02 NOTE — H&P ADULT - PROBLEM SELECTOR PLAN 3
Contact Dr. Allen Peterson ([facility]) [phone]  Pt in remission, completed course of steroids in 1990s.

## 2018-07-02 NOTE — CONSULT NOTE ADULT - ASSESSMENT
A/P: 48y Female with R bimalleolar equivalent ankle fracture with proximal fibula fracture  Pain control  NWB RLE in splint, keep c/d/I, cane/crutches/walker as needed  Ice/elevation  Need for surgical intervention in future discussed, all questions answered  Admit to medicine for syncopal workup  If cleared plan for possible surgery later today 7/2/18  NPO for possible OR  IVF while NPO  Hold chemical AC for OR  Will d/w attending and advise of plan
47yo female with syncopal episode and resultant right bimalleolar fracture - likely significant twisting as cause.  Although there is a distant history of sarcoidosis, there is no h/o cardiac sarcoidosis. Will obtain TTE to eval LVEF, monitor has been unremarkable so far.  If EF normal, I see no cardiac contraindications to planned orthopedic surgery. Cannot definitively r/o dysrhythmia, may consider outpatient EPS.

## 2018-07-02 NOTE — H&P ADULT - NSHPPHYSICALEXAM_GEN_ALL_CORE
GENERAL: NAD, well-groomed, well-developed  HEAD:  Atraumatic, Normocephalic  EYES: EOMI, PERRLA, conjunctiva and sclera clear  ENMT: No tonsillar erythema, exudates, or enlargement; Moist mucous membranes, No lesions  NECK: Supple, No JVD, Normal thyroid  NERVOUS SYSTEM:  Alert & Oriented X3, Good concentration CN 2-12 intact, strength 5/5 b/l UE, 5/5 LLE.  RLE strength limited due to pain.  CHEST/LUNG: Clear to ascultation bilaterally; No rales, rhonchi, wheezing, or rubs  HEART: Regular rate and rhythm; No murmurs, rubs, or gallops  ABDOMEN: Soft, Nontender, Nondistended; Bowel sounds present  EXTREMITIES: RLE-casted, full exam deferred due to recent cast, painful ROM, no pain at distal RLE, pulses intact  SKIN: no rashes or lesions   PSYCH: normal affect and behavior

## 2018-07-02 NOTE — PATIENT PROFILE ADULT. - TEACHING/LEARNING LEARNING PREFERENCES
verbal instruction/audio/individual instruction/video/written material/group instruction/pictorial/skill demonstration/computer/[employer]

## 2018-07-02 NOTE — H&P ADULT - HISTORY OF PRESENT ILLNESS
48 year old woman with PMH sarcoidosis and Fe def anemia presents with complaint of Syncope.  As per patient, she was out with friends and was walking to the bathroom and cannot remember anything else.  The next thing she remembers is that people were trying to make her drink orange juice.  There were no reported tremors, slurred speech, facial droop, incontinence, or confusion by witnesses.  She denies any symptoms prior to or after the episode except for RLE pain afterwards.  She denies lightheadedness, dizziness, visual changes, headache, chest pain, palpitations, SOB, weakness, numbness, tingling, visual changes, hearing changes; states "I felt completely normal both before and after I passed out".  She states her sarcoidosis was diagnosed in 1996 and she was on steroids for ~1 year; her only symptom was visual changes.  She remembers it was diagnosed by biopsy and says "I think that was it" when asked about non-caseating granulomas in the biopsy.  Her only current complaint is RLE pain.    In the ED, EKG NSR, imaging revealed R bimalleolar equivalent ankle fracture with proximal fibula fracture, CT head unremarkable, labs consistent with Hx of Fe def anemia, cardiac enzymes normal.

## 2018-07-02 NOTE — PATIENT PROFILE ADULT. - VISION (WITH CORRECTIVE LENSES IF THE PATIENT USUALLY WEARS THEM):
Normal vision: sees adequately in most situations; can see medication labels, newsprint/progressive glasses

## 2018-07-02 NOTE — H&P ADULT - PROBLEM SELECTOR PLAN 1
Though rare, should consider cardiac sarcoidosis as an etiology given PMH.  As per guidelines patients <60 years old with history of extracardiac sarcoid and unexplained Syncope should be evaluated for cardiac sarcoid.  Keep on telemetry to r/o arrhythmia  Cardiac enzymes x 3  TTE  Cardiology consult  Neurochecks q4h

## 2018-07-02 NOTE — PROGRESS NOTE ADULT - ASSESSMENT
47 yo F s/p R Ankle Syndesmotic ORIF  Analgesia  DVT ppx-  QD  Incentive spirometry  NWB RLE in Trilam splint   P/T  Discharge planning- Home 7/3

## 2018-07-02 NOTE — ED PROVIDER NOTE - MEDICAL DECISION MAKING DETAILS
patient pw syncope of unclear etiology. I read ekg as nsr rate 91 with normal qtc and pr, no st elevation or depression, no axis deviation. patient sustained maisonneauve fx and will need surgical treatment. ortho at bedside.

## 2018-07-02 NOTE — CONSULT NOTE ADULT - SUBJECTIVE AND OBJECTIVE BOX
48y Female community ambulatory presents c/o R ankle and proximal shin pain sp syncopal fall. The patient reports she was walking into a bathroom, the whole world went dark and she woke up in a chair with right ankle/lower leg pain. Denies numbness/tingling. No other pain/injuries. Denies fevers/chills.     HEALTH ISSUES - PROBLEM Dx:  Sarcoidosis      MEDICATIONS  (STANDING):    Allergies    No Known Allergies    Intolerances                              9.5    10.14 )-----------( 317      ( 02 Jul 2018 01:08 )             34.3     02 Jul 2018 01:08    142    |  110    |  11     ----------------------------<  109    4.2     |  22     |  1.16     Ca    9.0        02 Jul 2018 01:08  Mg     2.3       02 Jul 2018 01:08    TPro  7.7    /  Alb  3.7    /  TBili  0.3    /  DBili  x      /  AST  17     /  ALT  17     /  AlkPhos  94     02 Jul 2018 01:08    PT/INR - ( 02 Jul 2018 01:08 )   PT: 11.4 sec;   INR: 1.04 ratio         PTT - ( 02 Jul 2018 01:08 )  PTT:26.8 sec  Vital Signs Last 24 Hrs  T(C): 36.4 (07-01-18 @ 22:31), Max: 36.4 (07-01-18 @ 22:31)  T(F): 97.5 (07-01-18 @ 22:31), Max: 97.5 (07-01-18 @ 22:31)  HR: 77 (07-01-18 @ 22:31) (77 - 77)  BP: 138/67 (07-01-18 @ 22:31) (138/67 - 138/67)  BP(mean): --  RR: 17 (07-01-18 @ 22:31) (17 - 17)  SpO2: 100% (07-01-18 @ 22:31) (100% - 100%)    Imaging: XR demonstrates R bimalleolar equivalent ankle fracture with proximal fibula fracture    Physical Exam  Gen: Nad  RLE: Skin intact, +TTP medial malleolus and over proximal fibula, no bony ttp elsewhere, +ehl/fhl/ta/gs function, L3-S1 SILT, dp/pt pulse intact, negative log roll, able to SLR, compartments soft/compressive, extremity warm/well perfused  Secondary Survey: No TTP bony prominences with full AROM at baseline per patient, SILT diffusely, Capillary refill brisk, compartments soft and compressible, able to SLR with contralateral leg, no ttp axial spine.     Procedure: Closed reduction performed. Placed in well padded trilam splint. Post procedure imaging obtained. Post procedure exam unchanged, NV intact, able to move all toes, SILT distally.

## 2018-07-02 NOTE — ED PROVIDER NOTE - PHYSICAL EXAMINATION
Gen: Alert, looks uncomfortable   Head: NC, AT   Eyes: PERRL, EOMI, normal lids/conjunctiva  ENT: normal hearing, patent oropharynx without erythema/exudate, uvula midline  Neck: supple, no tenderness, Trachea midline  Pulm: Bilateral BS, normal resp effort, no wheeze/stridor/retractions  CV: RRR, no M/R/G, 2+ radial and dp pulses bl, no edema  Abd: soft, NT/ND, +BS, no hepatosplenomegaly  Mskel: right knee and right ankle are tender to palpation. no ctl spine ttp.   Skin: no rash, no bruising   Neuro: AAOx3, no sensory/motor deficits, CN 2-12 intact

## 2018-07-03 ENCOUNTER — TRANSCRIPTION ENCOUNTER (OUTPATIENT)
Age: 48
End: 2018-07-03

## 2018-07-03 LAB
ANION GAP SERPL CALC-SCNC: 12 MMOL/L — SIGNIFICANT CHANGE UP (ref 5–17)
BASOPHILS # BLD AUTO: 0.01 K/UL — SIGNIFICANT CHANGE UP (ref 0–0.2)
BASOPHILS NFR BLD AUTO: 0.2 % — SIGNIFICANT CHANGE UP (ref 0–2)
BUN SERPL-MCNC: 9 MG/DL — SIGNIFICANT CHANGE UP (ref 7–23)
CALCIUM SERPL-MCNC: 8.2 MG/DL — LOW (ref 8.5–10.1)
CHLORIDE SERPL-SCNC: 110 MMOL/L — HIGH (ref 96–108)
CO2 SERPL-SCNC: 20 MMOL/L — LOW (ref 22–31)
CREAT SERPL-MCNC: 1.01 MG/DL — SIGNIFICANT CHANGE UP (ref 0.5–1.3)
CULTURE RESULTS: SIGNIFICANT CHANGE UP
EOSINOPHIL # BLD AUTO: 0 K/UL — SIGNIFICANT CHANGE UP (ref 0–0.5)
EOSINOPHIL NFR BLD AUTO: 0 % — SIGNIFICANT CHANGE UP (ref 0–6)
GLUCOSE SERPL-MCNC: 113 MG/DL — HIGH (ref 70–99)
HCT VFR BLD CALC: 32 % — LOW (ref 34.5–45)
HGB BLD-MCNC: 8.7 G/DL — LOW (ref 11.5–15.5)
IMM GRANULOCYTES NFR BLD AUTO: 0.5 % — SIGNIFICANT CHANGE UP (ref 0–1.5)
LYMPHOCYTES # BLD AUTO: 0.62 K/UL — LOW (ref 1–3.3)
LYMPHOCYTES # BLD AUTO: 9.4 % — LOW (ref 13–44)
MCHC RBC-ENTMCNC: 20 PG — LOW (ref 27–34)
MCHC RBC-ENTMCNC: 27.2 GM/DL — LOW (ref 32–36)
MCV RBC AUTO: 73.4 FL — LOW (ref 80–100)
MONOCYTES # BLD AUTO: 0.12 K/UL — SIGNIFICANT CHANGE UP (ref 0–0.9)
MONOCYTES NFR BLD AUTO: 1.8 % — LOW (ref 2–14)
NEUTROPHILS # BLD AUTO: 5.83 K/UL — SIGNIFICANT CHANGE UP (ref 1.8–7.4)
NEUTROPHILS NFR BLD AUTO: 88.1 % — HIGH (ref 43–77)
PLATELET # BLD AUTO: 294 K/UL — SIGNIFICANT CHANGE UP (ref 150–400)
POTASSIUM SERPL-MCNC: 4.1 MMOL/L — SIGNIFICANT CHANGE UP (ref 3.5–5.3)
POTASSIUM SERPL-SCNC: 4.1 MMOL/L — SIGNIFICANT CHANGE UP (ref 3.5–5.3)
RBC # BLD: 4.36 M/UL — SIGNIFICANT CHANGE UP (ref 3.8–5.2)
RBC # FLD: 19.6 % — HIGH (ref 10.3–14.5)
SODIUM SERPL-SCNC: 142 MMOL/L — SIGNIFICANT CHANGE UP (ref 135–145)
SPECIMEN SOURCE: SIGNIFICANT CHANGE UP
TSH SERPL-MCNC: 0.42 UU/ML — SIGNIFICANT CHANGE UP (ref 0.36–3.74)
WBC # BLD: 6.61 K/UL — SIGNIFICANT CHANGE UP (ref 3.8–10.5)
WBC # FLD AUTO: 6.61 K/UL — SIGNIFICANT CHANGE UP (ref 3.8–10.5)

## 2018-07-03 PROCEDURE — 99233 SBSQ HOSP IP/OBS HIGH 50: CPT

## 2018-07-03 PROCEDURE — 99232 SBSQ HOSP IP/OBS MODERATE 35: CPT

## 2018-07-03 RX ORDER — MORPHINE SULFATE 50 MG/1
2 CAPSULE, EXTENDED RELEASE ORAL EVERY 4 HOURS
Qty: 0 | Refills: 0 | Status: DISCONTINUED | OUTPATIENT
Start: 2018-07-03 | End: 2018-07-04

## 2018-07-03 RX ORDER — MORPHINE SULFATE 50 MG/1
2 CAPSULE, EXTENDED RELEASE ORAL EVERY 4 HOURS
Qty: 0 | Refills: 0 | Status: DISCONTINUED | OUTPATIENT
Start: 2018-07-03 | End: 2018-07-03

## 2018-07-03 RX ORDER — FERROUS SULFATE 325(65) MG
325 TABLET ORAL DAILY
Qty: 0 | Refills: 0 | Status: DISCONTINUED | OUTPATIENT
Start: 2018-07-03 | End: 2018-07-04

## 2018-07-03 RX ADMIN — Medication 100 MILLIGRAM(S): at 21:10

## 2018-07-03 RX ADMIN — Medication 1 TABLET(S): at 11:15

## 2018-07-03 RX ADMIN — MORPHINE SULFATE 2 MILLIGRAM(S): 50 CAPSULE, EXTENDED RELEASE ORAL at 23:20

## 2018-07-03 RX ADMIN — Medication 100 MILLIGRAM(S): at 06:00

## 2018-07-03 RX ADMIN — MORPHINE SULFATE 2 MILLIGRAM(S): 50 CAPSULE, EXTENDED RELEASE ORAL at 09:07

## 2018-07-03 RX ADMIN — MORPHINE SULFATE 2 MILLIGRAM(S): 50 CAPSULE, EXTENDED RELEASE ORAL at 23:03

## 2018-07-03 RX ADMIN — Medication 325 MILLIGRAM(S): at 11:14

## 2018-07-03 RX ADMIN — Medication 1 MILLIGRAM(S): at 11:14

## 2018-07-03 RX ADMIN — MORPHINE SULFATE 2 MILLIGRAM(S): 50 CAPSULE, EXTENDED RELEASE ORAL at 10:07

## 2018-07-03 RX ADMIN — Medication 500 MILLIGRAM(S): at 18:00

## 2018-07-03 RX ADMIN — MORPHINE SULFATE 2 MILLIGRAM(S): 50 CAPSULE, EXTENDED RELEASE ORAL at 18:00

## 2018-07-03 RX ADMIN — MORPHINE SULFATE 2 MILLIGRAM(S): 50 CAPSULE, EXTENDED RELEASE ORAL at 19:00

## 2018-07-03 RX ADMIN — Medication 325 MILLIGRAM(S): at 11:15

## 2018-07-03 NOTE — PROGRESS NOTE ADULT - SUBJECTIVE AND OBJECTIVE BOX
INTERVAL HPI/OVERNIGHT EVENTS:  Pt seen and examined at bedside.     Allergies/Intolerance: No Known Allergies      MEDICATIONS  (STANDING):  ascorbic acid 500 milliGRAM(s) Oral two times a day  aspirin enteric coated 325 milliGRAM(s) Oral daily  docusate sodium 100 milliGRAM(s) Oral three times a day  ferrous    sulfate 325 milliGRAM(s) Oral three times a day with meals  folic acid 1 milliGRAM(s) Oral daily  multivitamin 1 Tablet(s) Oral daily    MEDICATIONS  (PRN):  acetaminophen   Tablet 650 milliGRAM(s) Oral every 6 hours PRN For Temp greater than 38 C (100.4 F)  acetaminophen   Tablet. 650 milliGRAM(s) Oral every 6 hours PRN headache  diphenhydrAMINE   Capsule 25 milliGRAM(s) Oral at bedtime PRN Itching  famotidine    Tablet 20 milliGRAM(s) Oral every 12 hours PRN dyspepsia  magnesium hydroxide Suspension 30 milliLiter(s) Oral daily PRN Constipation  melatonin 3 milliGRAM(s) Oral at bedtime PRN Insomnia  morphine  - Injectable 2 milliGRAM(s) IV Push every 4 hours PRN Severe Pain (7 - 10)  ondansetron Injectable 4 milliGRAM(s) IV Push every 6 hours PRN Nausea and/or Vomiting  oxyCODONE    IR 10 milliGRAM(s) Oral every 4 hours PRN Moderate Pain  oxyCODONE    IR 5 milliGRAM(s) Oral every 4 hours PRN Mild Pain        ROS: all systems reviewed and wnl      PHYSICAL EXAMINATION:  Vital Signs Last 24 Hrs  T(C): 36.1 (2018 12:58), Max: 36.7 (2018 18:37)  T(F): 97 (2018 12:58), Max: 98 (2018 18:37)  HR: 52 (2018 12:58) (46 - 88)  BP: 140/61 (2018 12:58) (105/68 - 143/67)  BP(mean): --  RR: 16 (2018 12:58) (14 - 18)  SpO2: 98% (2018 12:58) (98% - 100%)  CAPILLARY BLOOD GLUCOSE          07-02 @ 07:01  -  07-03 @ 07:00  --------------------------------------------------------  IN: 225 mL / OUT: 300 mL / NET: -75 mL        GENERAL:   NECK: supple, No JVD  CHEST/LUNG: clear to auscultation bilaterally; no rales, rhonchi, or wheezing b/l  HEART: normal S1, S2  ABDOMEN: BS+, soft, ND, NT   EXTREMITIES:  pulses palpable; no clubbing, cyanosis, or edema b/l LEs  SKIN: no rashes or lesions      LABS:                        8.7    6.61  )-----------( 294      ( 2018 07:55 )             32.0     07-03    142  |  110<H>  |  9   ----------------------------<  113<H>  4.1   |  20<L>  |  1.01    Ca    8.2<L>      2018 07:55  Mg     2.3     07-02    TPro  6.8  /  Alb  3.3  /  TBili  0.4  /  DBili  x   /  AST  19  /  ALT  14  /  AlkPhos  81  07-02    PT/INR - ( 2018 05:42 )   PT: 11.9 sec;   INR: 1.09 ratio         PTT - ( 2018 05:42 )  PTT:26.1 sec  Urinalysis Basic - ( 2018 01:08 )    Color: Yellow / Appearance: Slightly Turbid / S.010 / pH: x  Gluc: x / Ketone: Negative  / Bili: Negative / Urobili: Negative mg/dL   Blood: x / Protein: Negative mg/dL / Nitrite: Negative   Leuk Esterase: Small / RBC: x / WBC 0-2   Sq Epi: x / Non Sq Epi: Few / Bacteria: Moderate INTERVAL HPI/OVERNIGHT EVENTS:  Pt seen and examined at bedside.     Allergies/Intolerance: No Known Allergies      MEDICATIONS  (STANDING):  ascorbic acid 500 milliGRAM(s) Oral two times a day  aspirin enteric coated 325 milliGRAM(s) Oral daily  docusate sodium 100 milliGRAM(s) Oral three times a day  ferrous    sulfate 325 milliGRAM(s) Oral three times a day with meals  folic acid 1 milliGRAM(s) Oral daily  multivitamin 1 Tablet(s) Oral daily    MEDICATIONS  (PRN):  acetaminophen   Tablet 650 milliGRAM(s) Oral every 6 hours PRN For Temp greater than 38 C (100.4 F)  acetaminophen   Tablet. 650 milliGRAM(s) Oral every 6 hours PRN headache  diphenhydrAMINE   Capsule 25 milliGRAM(s) Oral at bedtime PRN Itching  famotidine    Tablet 20 milliGRAM(s) Oral every 12 hours PRN dyspepsia  magnesium hydroxide Suspension 30 milliLiter(s) Oral daily PRN Constipation  melatonin 3 milliGRAM(s) Oral at bedtime PRN Insomnia  morphine  - Injectable 2 milliGRAM(s) IV Push every 4 hours PRN Severe Pain (7 - 10)  ondansetron Injectable 4 milliGRAM(s) IV Push every 6 hours PRN Nausea and/or Vomiting  oxyCODONE    IR 10 milliGRAM(s) Oral every 4 hours PRN Moderate Pain  oxyCODONE    IR 5 milliGRAM(s) Oral every 4 hours PRN Mild Pain        ROS: all systems reviewed and wnl      PHYSICAL EXAMINATION:  Vital Signs Last 24 Hrs  T(C): 36.1 (2018 12:58), Max: 36.7 (2018 18:37)  T(F): 97 (2018 12:58), Max: 98 (2018 18:37)  HR: 52 (2018 12:58) (46 - 88)  BP: 140/61 (2018 12:58) (105/68 - 143/67)  BP(mean): --  RR: 16 (2018 12:58) (14 - 18)  SpO2: 98% (2018 12:58) (98% - 100%)  CAPILLARY BLOOD GLUCOSE          07-02 @ 07:01  -  07-03 @ 07:00  --------------------------------------------------------  IN: 225 mL / OUT: 300 mL / NET: -75 mL        GENERAL: stable in bed, right leg cast in place, no SOB, feves or CP  NECK: supple, No JVD  CHEST/LUNG: clear to auscultation bilaterally; no rales, rhonchi, or wheezing b/l  HEART: normal S1, S2  ABDOMEN: BS+, soft, ND, NT   EXTREMITIES:  pulses palpable; no clubbing, cyanosis, or edema b/l LEs  SKIN: no rashes or lesions      LABS:                        8.7    6.61  )-----------( 294      ( 2018 07:55 )             32.0     07-03    142  |  110<H>  |  9   ----------------------------<  113<H>  4.1   |  20<L>  |  1.01    Ca    8.2<L>      2018 07:55  Mg     2.3     07-02    TPro  6.8  /  Alb  3.3  /  TBili  0.4  /  DBili  x   /  AST  19  /  ALT  14  /  AlkPhos  81  07-02    PT/INR - ( 2018 05:42 )   PT: 11.9 sec;   INR: 1.09 ratio         PTT - ( 2018 05:42 )  PTT:26.1 sec  Urinalysis Basic - ( 2018 01:08 )    Color: Yellow / Appearance: Slightly Turbid / S.010 / pH: x  Gluc: x / Ketone: Negative  / Bili: Negative / Urobili: Negative mg/dL   Blood: x / Protein: Negative mg/dL / Nitrite: Negative   Leuk Esterase: Small / RBC: x / WBC 0-2   Sq Epi: x / Non Sq Epi: Few / Bacteria: Moderate

## 2018-07-03 NOTE — PROGRESS NOTE ADULT - PROBLEM SELECTOR PLAN 5
DVT prophylaxis-SCD LLE as possible OR today  General precautions DVT prophylaxis with  mg/day for 30 days.

## 2018-07-03 NOTE — DISCHARGE NOTE ADULT - VISION (WITH CORRECTIVE LENSES IF THE PATIENT USUALLY WEARS THEM):
progressive glasses/Normal vision: sees adequately in most situations; can see medication labels, newsprint

## 2018-07-03 NOTE — PROGRESS NOTE ADULT - PROBLEM SELECTOR PLAN 1
Though rare, should consider cardiac sarcoidosis as an etiology given PMH.  As per guidelines patients <60 years old with history of extracardiac sarcoid and unexplained Syncope should be evaluated for cardiac sarcoid.  Keep on telemetry to r/o arrhythmia  Cardiac enzymes x 3  TTE  Cardiology consult  Neurochecks q4h Though rare, should consider cardiac sarcoidosis as an etiology given PMH.  As per guidelines patients <60 years old with history of extracardiac sarcoid and unexplained Syncope should be evaluated for cardiac sarcoid.  Keep on telemetry to r/o arrhythmia  Cardiac enzymes x 3  TTE shows normal EF 60 % with normal LV, RV function  Cardiology consult  Neurochecks q4h

## 2018-07-03 NOTE — PROGRESS NOTE ADULT - PROBLEM SELECTOR PLAN 4
Continue Fe supplements, follow CBC Continue Fe supplements 325 mg/day, CBC shows acceptable HGB  of 8.7.

## 2018-07-03 NOTE — DISCHARGE NOTE ADULT - PATIENT PORTAL LINK FT
You can access the [facility] Portal, offered by [facility], by registering with the following website: [email or web address]

## 2018-07-03 NOTE — PROGRESS NOTE ADULT - ASSESSMENT
47 yo F s/p R Ankle Syndesmotic ORIF POD 1  Analgesia  DVT ppx-  QD for 30 days   Incentive spirometry  NWB RLE in Trilam splint, assistive devices as needed  P/T  Discharge planning- Home  No further orthopedic sx intervention at this time  Please FU with Dr Millan in the office in 1-2 weeks after DC, call for appointment  Ortho stable for DC

## 2018-07-03 NOTE — PROGRESS NOTE ADULT - ASSESSMENT
49yo female with syncopal episode and resultant right bimalleolar fracture - s/p surgical correction.  Although there is a distant history of sarcoidosis, there is no h/o cardiac sarcoidosis.  No evidence of cardiac sarcoid on echo.   Cannot definitively r/o dysrhythmia.    -supportive care for now  -f/u with cardiology as outpt; will likely need MRI to definitively r/o cardiac sarcoid, and Holter monitor/EPS evaluation to r/o arrhythmias.  -will sign off for now; please call back with any further questions.

## 2018-07-03 NOTE — DISCHARGE NOTE ADULT - CARE PROVIDER_API CALL
Deacon Jeronimo), Orthopaedic Surgery  [address] [place] [address]  Phone: [phone]  Fax: [phone]

## 2018-07-03 NOTE — PROGRESS NOTE ADULT - PROBLEM SELECTOR PLAN 3
Contact Dr. Allen Peterson ([facility]) [phone]  Pt in remission, completed course of steroids in 1990s.

## 2018-07-03 NOTE — PROGRESS NOTE ADULT - SUBJECTIVE AND OBJECTIVE BOX
Pt seen & examined. Pain controlled. Denies any Fever/Cp/SOB/Chills overnight.     Vital Signs Last 24 Hrs  T(C): 36.1 (03 Jul 2018 04:00), Max: 36.7 (02 Jul 2018 18:37)  T(F): 96.9 (03 Jul 2018 04:00), Max: 98 (02 Jul 2018 18:37)  HR: 46 (03 Jul 2018 04:00) (46 - 88)  BP: 135/55 (03 Jul 2018 04:00) (105/68 - 148/67)  RR: 17 (03 Jul 2018 04:00) (14 - 18)  SpO2: 98% (03 Jul 2018 04:00) (98% - 100%)    Gen: NAD  RLE:  Dressing clean D&I in Trilam Splint  +sensation to toes 1-5  +movement ot toes 1-5  foot warm and perfused with brisk cap refill   Soft compartments, - calf tenderness

## 2018-07-03 NOTE — DISCHARGE NOTE ADULT - PLAN OF CARE
return to ADLs ORIF DC Instructions:    1.	Analgesia  2.	Non-Weight Bearing  Right Lower Extremity in splint, with assistive device/rolling walker  3.	Continue DVT/PE Prophylaxis. EC ASpirin 325 daily for 30days.                       Take Pepcid daily while on either of these medications.  4.	PT daily  5.	Follow up with Orthopedic Surgeon Dr. Jeronimo in 10-14 Days after Discharge from the Hospital. Call Office For Appointment.  6.	Staples/Sutures to be removed Post-Op Day 14, and repeat x-rays in office.  7.	Elevate the extremity as much as possible  8.	Keep bandage/Splint Clean and dry. Do not get it wet. Do not walk or put any body weight on splint because it will break. Continue Iron

## 2018-07-03 NOTE — DISCHARGE NOTE ADULT - MEDICATION SUMMARY - MEDICATIONS TO TAKE
I will START or STAY ON the medications listed below when I get home from the hospital:    aspirin 325 mg oral delayed release tablet  -- 1 tab(s) by mouth once a day  -- Indication: For VTE prophylaxis    oxyCODONE 5 mg oral tablet  -- 1 tab(s) by mouth every 6 hours, As Needed -Mild Pain MDD:4 tabs  -- Indication: For Pain Control    ferrous sulfate 325 mg (65 mg elemental iron) oral tablet  -- 1 tab(s) by mouth every other day (at bedtime)  -- Indication: For Anemia I will START or STAY ON the medications listed below when I get home from the hospital:    aspirin 325 mg oral delayed release tablet  -- 1 tab(s) by mouth once a day  -- Indication: For VTE prophylaxis    oxyCODONE 5 mg oral tablet  -- 1 tab(s) by mouth every 6 hours, As Needed -Mild Pain MDD:4 tabs  -- Indication: For Pain Control    famotidine 20 mg oral tablet  -- 1 tab(s) by mouth every 12 hours, As needed, dyspepsia  -- Indication: For Acid reflux    ferrous sulfate 325 mg (65 mg elemental iron) oral tablet  -- 1 tab(s) by mouth every other day (at bedtime)  -- Indication: For Anemia

## 2018-07-03 NOTE — DISCHARGE NOTE ADULT - CARE PLAN
Principal Discharge DX:	Closed nondisplaced Titiuvsummer fracture of right lower extremity, initial encounter  Goal:	return to ADLs  Assessment and plan of treatment:	ORIF DC Instructions:    1.	Analgesia  2.	Non-Weight Bearing  Right Lower Extremity in splint, with assistive device/rolling walker  3.	Continue DVT/PE Prophylaxis. EC ASpirin 325 daily for 30days.                       Take Pepcid daily while on either of these medications.  4.	PT daily  5.	Follow up with Orthopedic Surgeon Dr. Jeronimo in 10-14 Days after Discharge from the Hospital. Call Office For Appointment.  6.	Staples/Sutures to be removed Post-Op Day 14, and repeat x-rays in office.  7.	Elevate the extremity as much as possible  8.	Keep bandage/Splint Clean and dry. Do not get it wet. Do not walk or put any body weight on splint because it will break. Principal Discharge DX:	Closed nondisplaced Melvinaneuvsummer fracture of right lower extremity, initial encounter  Goal:	return to ADLs  Assessment and plan of treatment:	ORIF DC Instructions:    1.	Analgesia  2.	Non-Weight Bearing  Right Lower Extremity in splint, with assistive device/rolling walker  3.	Continue DVT/PE Prophylaxis. EC ASpirin 325 daily for 30days.                       Take Pepcid daily while on either of these medications.  4.	PT daily  5.	Follow up with Orthopedic Surgeon Dr. Jeronimo in 10-14 Days after Discharge from the Hospital. Call Office For Appointment.  6.	Staples/Sutures to be removed Post-Op Day 14, and repeat x-rays in office.  7.	Elevate the extremity as much as possible  8.	Keep bandage/Splint Clean and dry. Do not get it wet. Do not walk or put any body weight on splint because it will break.  Secondary Diagnosis:	Anemia, unspecified type  Assessment and plan of treatment:	Continue Iron

## 2018-07-03 NOTE — DISCHARGE NOTE ADULT - HOSPITAL COURSE
HPI:  48 year old woman with PMH sarcoidosis and Fe def anemia presents with complaint of Syncope.  As per patient, she was out with friends and was walking to the bathroom and cannot remember anything else.  The next thing she remembers is that people were trying to make her drink orange juice.  There were no reported tremors, slurred speech, facial droop, incontinence, or confusion by witnesses.  She denies any symptoms prior to or after the episode except for RLE pain afterwards.  She denies lightheadedness, dizziness, visual changes, headache, chest pain, palpitations, SOB, weakness, numbness, tingling, visual changes, hearing changes; states "I felt completely normal both before and after I passed out".  She states her sarcoidosis was diagnosed in 1996 and she was on steroids for ~1 year; her only symptom was visual changes.  She remembers it was diagnosed by biopsy and says "I think that was it" when asked about non-caseating granulomas in the biopsy.  Her only current complaint is RLE pain.    In the ED, EKG NSR, imaging revealed R bimalleolar equivalent ankle fracture with proximal fibula fracture, CT head unremarkable, labs consistent with Hx of Fe def anemia, cardiac enzymes normal. (02 Jul 2018 02:41)    Discharge to home. See med list and summary. follow-up with ortho as directed for repeat X-ray of right ankle.     See med list and summary.

## 2018-07-03 NOTE — PROGRESS NOTE ADULT - SUBJECTIVE AND OBJECTIVE BOX
YANI ALVARADO is a 48y Female with a known history of :  Iron deficiency anemia, unspecified iron deficiency anemia type (D50.9)  Sarcoidosis (D86.9)  Closed nondisplaced Maisonneuve fracture of right lower extremity, initial encounter (M32.845I)  Syncope, unspecified syncope type (R55)    HPI:  48 year old woman with PMH sarcoidosis and Fe def anemia presents with complaint of Syncope.    As per patient, she was out with friends and was walking to the bathroom and cannot remember anything else.  The next thing she remembers is that people were trying to make her drink orange juice.  There were no reported tremors, slurred speech, facial droop, incontinence, or confusion by witnesses.  She denies any symptoms prior to or after the episode except for RLE pain afterwards.  She denies lightheadedness, dizziness, visual changes, headache, chest pain, palpitations, SOB, weakness, numbness, tingling, visual changes, hearing changes; states "I felt completely normal both before and after I passed out".  She states her sarcoidosis was diagnosed in 1996 and she was on steroids for ~1 year; her only symptom was visual changes.  She remembers it was diagnosed by biopsy and says "I think that was it" when asked about non-caseating granulomas in the biopsy.  Her only current complaint is RLE pain.    In the ED, EKG NSR, imaging revealed R bimalleolar equivalent ankle fracture with proximal fibula fracture, CT head unremarkable, labs consistent with Hx of Fe def anemia, cardiac enzymes normal.  Works as a nurse. Ex-smoker. No other constitutional symptoms.    s/p surgery; uncomplicated.  Feeling well.     REVIEW OF SYSTEMS:    CONSTITUTIONAL: No fever, weight loss, or fatigue  EYES: No eye pain, visual disturbances, or discharge  ENMT:  No difficulty hearing, tinnitus, vertigo; No sinus or throat pain  NECK: No pain or stiffness  BREASTS: No pain, masses, or nipple discharge  RESPIRATORY: No cough, wheezing, chills or hemoptysis; No shortness of breath  CARDIOVASCULAR: No chest pain, palpitations, dizziness, or leg swelling  GASTROINTESTINAL: No abdominal or epigastric pain. No nausea, vomiting, or hematemesis; No diarrhea or constipation. No melena or hematochezia.  GENITOURINARY: No dysuria, frequency, hematuria, or incontinence  NEUROLOGICAL: No headaches, memory loss, loss of strength, numbness, or tremors  SKIN: No itching, burning, rashes, or lesions   LYMPH NODES: No enlarged glands  ENDOCRINE: No heat or cold intolerance; No hair loss  MUSCULOSKELETAL: No joint pain or swelling; No muscle, back, or extremity pain  PSYCHIATRIC: No depression, anxiety, mood swings, or difficulty sleeping  HEME/LYMPH: No easy bruising, or bleeding gums  ALLERGY AND IMMUNOLOGIC: No hives or eczema    MEDICATIONS  (STANDING):  ascorbic acid 500 milliGRAM(s) Oral two times a day  aspirin enteric coated 325 milliGRAM(s) Oral daily  docusate sodium 100 milliGRAM(s) Oral three times a day  ferrous    sulfate 325 milliGRAM(s) Oral daily  folic acid 1 milliGRAM(s) Oral daily  multivitamin 1 Tablet(s) Oral daily    MEDICATIONS  (PRN):  acetaminophen   Tablet 650 milliGRAM(s) Oral every 6 hours PRN For Temp greater than 38 C (100.4 F)  acetaminophen   Tablet. 650 milliGRAM(s) Oral every 6 hours PRN headache  diphenhydrAMINE   Capsule 25 milliGRAM(s) Oral at bedtime PRN Itching  famotidine    Tablet 20 milliGRAM(s) Oral every 12 hours PRN dyspepsia  magnesium hydroxide Suspension 30 milliLiter(s) Oral daily PRN Constipation  melatonin 3 milliGRAM(s) Oral at bedtime PRN Insomnia  morphine  - Injectable 2 milliGRAM(s) IV Push every 4 hours PRN Severe Pain (7 - 10)  ondansetron Injectable 4 milliGRAM(s) IV Push every 6 hours PRN Nausea and/or Vomiting  oxyCODONE    IR 10 milliGRAM(s) Oral every 4 hours PRN Moderate Pain  oxyCODONE    IR 5 milliGRAM(s) Oral every 4 hours PRN Mild Pain      ALLERGIES: No Known Allergies      FAMILY HISTORY: FAMILY HISTORY:  No pertinent family history in first degree relatives      PHYSICAL EXAMINATION:  -----------------------------  Vital Signs Last 24 Hrs  T(C): 36.1 (03 Jul 2018 12:58), Max: 36.7 (02 Jul 2018 18:37)  T(F): 97 (03 Jul 2018 12:58), Max: 98 (02 Jul 2018 18:37)  HR: 52 (03 Jul 2018 12:58) (46 - 88)  BP: 140/61 (03 Jul 2018 12:58) (105/68 - 143/67)  BP(mean): --  RR: 16 (03 Jul 2018 12:58) (14 - 18)  SpO2: 98% (03 Jul 2018 12:58) (98% - 100%)      Constitutional: well developed, normal appearance, well groomed, well nourished, no deformities and no acute distress.   Eyes: the conjunctiva exhibited no abnormalities and the eyelids demonstrated no xanthelasmas.   HEENT: normal oral mucosa, no oral pallor and no oral cyanosis.   Neck: normal jugular venous A waves present, normal jugular venous V waves present and no jugular venous worley A waves.   Pulmonary: no respiratory distress, normal respiratory rhythm and effort, no accessory muscle use and lungs were clear to auscultation bilaterally.   Cardiovascular: heart rate and rhythm were normal, normal S1 and S2 and no murmur, gallop, rub, heave or thrill are present.   Abdomen: soft, non-tender, no hepato-splenomegaly and no abdominal mass palpated.   Musculoskeletal: the gait could not be assessed..   Extremities: right root in cast up to knee  Skin: normal skin color and pigmentation, no rash, no venous stasis, no skin lesions, no skin ulcer and no xanthoma was observed.   Psychiatric: oriented to person, place, and time, the affect was normal, the mood was normal and not feeling anxious.     LABS:   --------                        8.7    6.61  )-----------( 294      ( 03 Jul 2018 07:55 )             32.0   07-03    142  |  110<H>  |  9   ----------------------------<  113<H>  4.1   |  20<L>  |  1.01    Ca    8.2<L>      03 Jul 2018 07:55  Mg     2.3     07-02    TPro  6.8  /  Alb  3.3  /  TBili  0.4  /  DBili  x   /  AST  19  /  ALT  14  /  AlkPhos  81  07-02      RADIOLOGY:  -----------------    ECG: NSR, PRWP    < from: TTE Echo Doppler w/o Cont (07.02.18 @ 08:23) >   1. Left ventricular ejection fraction, by visual estimation, is 60 to   65%.   2. There is no left ventricular hypertrophy.   3. Mild mitral valve regurgitation.   4. Mild tricuspid regurgitation.   5. Trace pulmonic valve regurgitation.   6. Estimated pulmonary artery systolic pressure is 37.3 mmHg assuming a   right atrial pressure of 5 mmHg, which is consistent with mild pulmonary   hypertension.   7. The aortic valve mean gradient is 2.8 mmHg consistent with normally   opening aortic valve.    < end of copied text >

## 2018-07-04 VITALS
SYSTOLIC BLOOD PRESSURE: 127 MMHG | HEART RATE: 57 BPM | RESPIRATION RATE: 18 BRPM | DIASTOLIC BLOOD PRESSURE: 78 MMHG | OXYGEN SATURATION: 99 %

## 2018-07-04 LAB
ALBUMIN SERPL ELPH-MCNC: 3.1 G/DL — LOW (ref 3.3–5)
ALP SERPL-CCNC: 84 U/L — SIGNIFICANT CHANGE UP (ref 40–120)
ALT FLD-CCNC: 14 U/L — SIGNIFICANT CHANGE UP (ref 12–78)
ANION GAP SERPL CALC-SCNC: 6 MMOL/L — SIGNIFICANT CHANGE UP (ref 5–17)
AST SERPL-CCNC: 17 U/L — SIGNIFICANT CHANGE UP (ref 15–37)
BILIRUB SERPL-MCNC: 0.4 MG/DL — SIGNIFICANT CHANGE UP (ref 0.2–1.2)
BUN SERPL-MCNC: 14 MG/DL — SIGNIFICANT CHANGE UP (ref 7–23)
CALCIUM SERPL-MCNC: 8.4 MG/DL — LOW (ref 8.5–10.1)
CHLORIDE SERPL-SCNC: 111 MMOL/L — HIGH (ref 96–108)
CO2 SERPL-SCNC: 27 MMOL/L — SIGNIFICANT CHANGE UP (ref 22–31)
CREAT SERPL-MCNC: 1.21 MG/DL — SIGNIFICANT CHANGE UP (ref 0.5–1.3)
GLUCOSE SERPL-MCNC: 125 MG/DL — HIGH (ref 70–99)
POTASSIUM SERPL-MCNC: 3.5 MMOL/L — SIGNIFICANT CHANGE UP (ref 3.5–5.3)
POTASSIUM SERPL-SCNC: 3.5 MMOL/L — SIGNIFICANT CHANGE UP (ref 3.5–5.3)
PROT SERPL-MCNC: 6.6 GM/DL — SIGNIFICANT CHANGE UP (ref 6–8.3)
SODIUM SERPL-SCNC: 144 MMOL/L — SIGNIFICANT CHANGE UP (ref 135–145)

## 2018-07-04 PROCEDURE — 99239 HOSP IP/OBS DSCHRG MGMT >30: CPT

## 2018-07-04 RX ORDER — FAMOTIDINE 10 MG/ML
1 INJECTION INTRAVENOUS
Qty: 60 | Refills: 0 | OUTPATIENT
Start: 2018-07-04 | End: 2018-08-02

## 2018-07-04 RX ORDER — SODIUM CHLORIDE 9 MG/ML
3 INJECTION INTRAMUSCULAR; INTRAVENOUS; SUBCUTANEOUS EVERY 8 HOURS
Qty: 0 | Refills: 0 | Status: DISCONTINUED | OUTPATIENT
Start: 2018-07-04 | End: 2018-07-04

## 2018-07-04 RX ORDER — OXYCODONE HYDROCHLORIDE 5 MG/1
1 TABLET ORAL
Qty: 20 | Refills: 0 | OUTPATIENT
Start: 2018-07-04 | End: 2018-07-08

## 2018-07-04 RX ORDER — ASPIRIN/CALCIUM CARB/MAGNESIUM 324 MG
1 TABLET ORAL
Qty: 30 | Refills: 0 | OUTPATIENT
Start: 2018-07-04 | End: 2018-08-02

## 2018-07-04 RX ADMIN — Medication 325 MILLIGRAM(S): at 13:30

## 2018-07-04 RX ADMIN — Medication 1 TABLET(S): at 13:31

## 2018-07-04 RX ADMIN — SODIUM CHLORIDE 3 MILLILITER(S): 9 INJECTION INTRAMUSCULAR; INTRAVENOUS; SUBCUTANEOUS at 13:33

## 2018-07-04 RX ADMIN — Medication 100 MILLIGRAM(S): at 05:17

## 2018-07-04 RX ADMIN — Medication 325 MILLIGRAM(S): at 13:31

## 2018-07-04 RX ADMIN — MORPHINE SULFATE 2 MILLIGRAM(S): 50 CAPSULE, EXTENDED RELEASE ORAL at 09:26

## 2018-07-04 RX ADMIN — OXYCODONE HYDROCHLORIDE 10 MILLIGRAM(S): 5 TABLET ORAL at 14:00

## 2018-07-04 RX ADMIN — Medication 100 MILLIGRAM(S): at 13:31

## 2018-07-04 RX ADMIN — MORPHINE SULFATE 2 MILLIGRAM(S): 50 CAPSULE, EXTENDED RELEASE ORAL at 09:50

## 2018-07-04 RX ADMIN — OXYCODONE HYDROCHLORIDE 10 MILLIGRAM(S): 5 TABLET ORAL at 13:40

## 2018-07-04 RX ADMIN — Medication 500 MILLIGRAM(S): at 05:17

## 2018-07-04 RX ADMIN — Medication 1 MILLIGRAM(S): at 13:31

## 2018-07-04 NOTE — PROGRESS NOTE ADULT - SUBJECTIVE AND OBJECTIVE BOX
INTERVAL HPI/OVERNIGHT EVENTS:  Pt seen and examined at bedside.     Allergies/Intolerance: No Known Allergies      MEDICATIONS  (STANDING):  ascorbic acid 500 milliGRAM(s) Oral two times a day  aspirin enteric coated 325 milliGRAM(s) Oral daily  docusate sodium 100 milliGRAM(s) Oral three times a day  ferrous    sulfate 325 milliGRAM(s) Oral daily  folic acid 1 milliGRAM(s) Oral daily  multivitamin 1 Tablet(s) Oral daily  sodium chloride 0.9% lock flush 3 milliLiter(s) IV Push every 8 hours    MEDICATIONS  (PRN):  acetaminophen   Tablet 650 milliGRAM(s) Oral every 6 hours PRN For Temp greater than 38 C (100.4 F)  acetaminophen   Tablet. 650 milliGRAM(s) Oral every 6 hours PRN headache  diphenhydrAMINE   Capsule 25 milliGRAM(s) Oral at bedtime PRN Itching  famotidine    Tablet 20 milliGRAM(s) Oral every 12 hours PRN dyspepsia  magnesium hydroxide Suspension 30 milliLiter(s) Oral daily PRN Constipation  melatonin 3 milliGRAM(s) Oral at bedtime PRN Insomnia  morphine  - Injectable 2 milliGRAM(s) IV Push every 4 hours PRN Severe Pain (7 - 10)  ondansetron Injectable 4 milliGRAM(s) IV Push every 6 hours PRN Nausea and/or Vomiting  oxyCODONE    IR 10 milliGRAM(s) Oral every 4 hours PRN Moderate Pain  oxyCODONE    IR 5 milliGRAM(s) Oral every 4 hours PRN Mild Pain        ROS: all systems reviewed and wnl      PHYSICAL EXAMINATION:  Vital Signs Last 24 Hrs  T(C): 36.1 (04 Jul 2018 05:31), Max: 37.7 (03 Jul 2018 17:35)  T(F): 97 (04 Jul 2018 05:31), Max: 99.8 (03 Jul 2018 17:35)  HR: 50 (04 Jul 2018 05:31) (50 - 62)  BP: 129/55 (04 Jul 2018 05:31) (129/55 - 140/61)  BP(mean): --  RR: 16 (04 Jul 2018 05:31) (16 - 16)  SpO2: 100% (04 Jul 2018 05:31) (98% - 100%)  CAPILLARY BLOOD GLUCOSE          07-03 @ 07:01  -  07-04 @ 07:00  --------------------------------------------------------  IN: 400 mL / OUT: 0 mL / NET: 400 mL        GENERAL: stable, no new complaints  NECK: supple, No JVD  CHEST/LUNG: clear to auscultation bilaterally; no rales, rhonchi, or wheezing b/l  HEART: normal S1, S2  ABDOMEN: BS+, soft, ND, NT   EXTREMITIES:  pulses palpable; no clubbing, cyanosis, or edema b/l LEs  SKIN: no rashes or lesions      LABS:                        8.7    6.61  )-----------( 294      ( 03 Jul 2018 07:55 )             32.0     07-04    144  |  111<H>  |  14  ----------------------------<  125<H>  3.5   |  27  |  1.21    Ca    8.4<L>      04 Jul 2018 10:22    TPro  6.6  /  Alb  3.1<L>  /  TBili  0.4  /  DBili  x   /  AST  17  /  ALT  14  /  AlkPhos  84  07-04

## 2018-07-04 NOTE — PHYSICAL THERAPY INITIAL EVALUATION ADULT - PERTINENT HX OF CURRENT PROBLEM, REHAB EVAL
Per chart:Pt presented to ER due after she collapsed in a restaurant. Pt is diagnosed with Syncope, non displaced Maisonneuve fracture of right ankle and underwent s/ORIF of right ankle . X-ray of right ankle  on 7/2/18 results confirm Status post closed reduction right ankle fracture dislocation. Pt has a history of multiple comorbidities.

## 2018-07-04 NOTE — OCCUPATIONAL THERAPY INITIAL EVALUATION ADULT - PLANNED THERAPY INTERVENTIONS, OT EVAL
ADL retraining/parent/caregiver training.../IADL retraining/balance training/joint mobilization/neuromuscular re-education/ROM/strengthening/stretching/energy conservation techniques

## 2018-07-04 NOTE — OCCUPATIONAL THERAPY INITIAL EVALUATION ADULT - TRANSFER SAFETY CONCERNS NOTED: SIT/STAND, REHAB EVAL
squat pivot/stand pivot/decreased step length/inability to maintain weight-bearing restrictions w/o assist

## 2018-07-04 NOTE — OCCUPATIONAL THERAPY INITIAL EVALUATION ADULT - TRANSFER SAFETY CONCERNS NOTED: BED/CHAIR, REHAB EVAL
decreased weight-shifting ability/stand pivot/decreased step length/inability to maintain weight-bearing restrictions w/o assist

## 2018-07-04 NOTE — PROGRESS NOTE ADULT - ASSESSMENT
Discharge to home. See med list and summary. follow-up with ortho as directed for repeat X-ray of right ankle.     See med list and summary.

## 2018-07-04 NOTE — PHYSICAL THERAPY INITIAL EVALUATION ADULT - IMPAIRMENTS CONTRIBUTING TO GAIT DEVIATIONS, PT EVAL
pain/patient is able to ambulate 10 ft with bilateral axillary crutches with CG, gait unsteady./impaired coordination/impaired balance

## 2018-07-04 NOTE — PROGRESS NOTE ADULT - SUBJECTIVE AND OBJECTIVE BOX
Ortho Progress Note:    Pt s/e this AM. Doing well with mild pain on R lateral ankle. Able to ambulate to toilet w/o difficulty. Denies CP/dyspnea/F/C. Denies calf tenderness.    Vital Signs Last 24 Hrs  T(C): 36.1 (04 Jul 2018 05:31), Max: 37.7 (03 Jul 2018 17:35)  T(F): 97 (04 Jul 2018 05:31), Max: 99.8 (03 Jul 2018 17:35)  HR: 50 (04 Jul 2018 05:31) (50 - 62)  BP: 129/55 (04 Jul 2018 05:31) (129/55 - 140/61)  BP(mean): --  RR: 16 (04 Jul 2018 05:31) (16 - 16)  SpO2: 100% (04 Jul 2018 05:31) (98% - 100%)    PE RLE:  dressing and trilam splint c/d/i  +Motor EHL/FHL/Quads/HS  +SILT SPN/DPN  Toes WWP, Cap refil <2sec

## 2018-07-04 NOTE — OCCUPATIONAL THERAPY INITIAL EVALUATION ADULT - RANGE OF MOTION EXAMINATION, LOWER EXTREMITY
ROM  in  rigjht hip is 0-45, right knee 0-40 degrees with pain ./Left LE Active ROM was WNL  (within normal limits)/Left LE Passive ROM was WNL (within normal limits)

## 2018-07-04 NOTE — OCCUPATIONAL THERAPY INITIAL EVALUATION ADULT - GENERAL OBSERVATIONS, REHAB EVAL
Pt was seen for initial OT consult, encountered OOB to chair on cardiac monitoring with spouse at bedside; pt was AA&Ox4, cooperative & followed commands. Pt c/o right kneeand ankle pain due to s/p fibular fracture & s/p ORIF ; this limits pt's activity tolerance ,balance, ADL management and functional mobility. NWB status on RLE was reviewed & maintained.

## 2018-07-04 NOTE — PROGRESS NOTE ADULT - ASSESSMENT
A/P: 48yoF s/p R ankle syndesmotic ORIF, now POD 2    Pain control  DVT ppx-  QD for 30 days; ICS  NWB RLE in Trilam splint, assistive devices as needed  P/T  Discharge planning- Home  No further orthopedic sx intervention at this time  Please FU with Dr Millan in the office in 1-2 weeks after DC, call for appointment  Ortho stable for DC A/P: 48yoF s/p R ankle syndesmotic ORIF, now POD 2    Pain control  DVT ppx-  QD for 30 days; ICS  NWB RLE in Trilam splint, assistive devices as needed  P/T  Discharge planning- Home  No further orthopedic sx intervention at this time  Please FU with Dr Millan in the office in 1-2 weeks after DC, call for appointment  Ortho stable for DC, see d/c note for instructions

## 2018-07-04 NOTE — OCCUPATIONAL THERAPY INITIAL EVALUATION ADULT - PERTINENT HX OF CURRENT PROBLEM, REHAB EVAL
Pt presented to ER due after she collapsed in a restaurant. Pt is diagnosed with Syncope, non displaced Maisonneuve fracture of right ankle and underwent s/ORIF of right ankle . X-ray of right ankle  on 7/2/18 results confirm Status post closed reduction right ankle fracture dislocation./ Pt has a history of multiple comorbidities. Pt presented to ER due after she collapsed in a restaurant. Pt is diagnosed with Syncope, non displaced Maisonneuve fracture of right ankle and underwent s/ORIF of right ankle . X-ray of right ankle  on 7/2/18 results confirm Status post closed reduction right ankle fracture dislocation. Pt has a history of multiple comorbidities.

## 2018-07-04 NOTE — OCCUPATIONAL THERAPY INITIAL EVALUATION ADULT - SOCIAL CONCERNS
Pt voiced concerns about the loss of ability to ambulate and care for herself independently. Pt has the support of her   to assist her as needed after discharge home ./Complex psychosocial needs/coping issues Pt voiced concerns about the loss of ability to ambulate and care for herself independently. Pt has the support of her  to assist her as needed after discharge home ./Complex psychosocial needs/coping issues

## 2018-07-04 NOTE — OCCUPATIONAL THERAPY INITIAL EVALUATION ADULT - ADDITIONAL COMMENTS
Prior to admission pt was functioning in her roles, self sufficient, driving & ambulating independently in the community without any assistive devices. Presently , pt needs assistance with functional mobility and to complete  lower body self care tasks The scale below depicts a picture of the pt's current level of functioning. Barthel Index: Feeding Score____10__, Bathing Score___0___, Grooming Score___5__, Dressing Score____5_, Bowel Score___10__, Bladder Score___10___, Toilet Score____10_, Transfer Score____10__, Mobility Score__10___, Stairs Score___5__, Total Score___75/100__. Pt is right hand dominant and wears glasses for reading.

## 2018-07-12 DIAGNOSIS — R55 SYNCOPE AND COLLAPSE: ICD-10-CM

## 2018-07-12 DIAGNOSIS — S82.54XA NONDISPLACED FRACTURE OF MEDIAL MALLEOLUS OF RIGHT TIBIA, INITIAL ENCOUNTER FOR CLOSED FRACTURE: ICD-10-CM

## 2018-07-12 DIAGNOSIS — D50.9 IRON DEFICIENCY ANEMIA, UNSPECIFIED: ICD-10-CM
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
## 2018-07-12 DIAGNOSIS — D86.9 SARCOIDOSIS, UNSPECIFIED: ICD-10-CM

## 2018-07-12 DIAGNOSIS — S82.864A NONDISPLACED MAISONNEUVE'S FRACTURE OF RIGHT LEG, INITIAL ENCOUNTER FOR CLOSED FRACTURE: ICD-10-CM
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
## 2019-07-11 NOTE — PHYSICAL THERAPY INITIAL EVALUATION ADULT - ASR EQUIP NEEDS DISCH PT EVAL
[FreeTextEntry1] : Samantha is a 9 year old girl with a restrictive membranous ventricular septal defect diagnosed within the first few months of age, complicated by a prolapsed aortic cusp and trivial aortic valve regurgitation.  She presents today for follow up cardiac evaluation.  In the interval since her last visit, she has been doing very well from a cardiovascular standpoint without chest pain, palpitations, exertional dyspnea or other symptoms referable to the cardiovascular system.\par \par Family and social histories remain noncontributory.  Review of systems is otherwise unremarkable. rolling walker (5 inch wheels)

## 2020-11-19 ENCOUNTER — APPOINTMENT (OUTPATIENT)
Dept: LAB | Age: 50
End: 2020-11-19

## 2020-11-19 ENCOUNTER — TRANSCRIBE ORDERS (OUTPATIENT)
Dept: URGENT CARE | Age: 50
End: 2020-11-19

## 2020-11-19 ENCOUNTER — APPOINTMENT (OUTPATIENT)
Dept: URGENT CARE | Age: 50
End: 2020-11-19

## 2020-11-19 DIAGNOSIS — Z00.8 SPECIAL EXAM: Primary | ICD-10-CM

## 2020-11-19 DIAGNOSIS — Z00.8 SPECIAL EXAM: ICD-10-CM

## 2020-11-19 PROCEDURE — 36415 COLL VENOUS BLD VENIPUNCTURE: CPT

## 2020-11-19 PROCEDURE — 86480 TB TEST CELL IMMUN MEASURE: CPT

## 2020-11-19 PROCEDURE — 90715 TDAP VACCINE 7 YRS/> IM: CPT

## 2020-11-19 PROCEDURE — 90682 RIV4 VACC RECOMBINANT DNA IM: CPT

## 2020-11-20 LAB
GAMMA INTERFERON BACKGROUND BLD IA-ACNC: 0.04 IU/ML
M TB IFN-G BLD-IMP: NEGATIVE
M TB IFN-G CD4+ BCKGRND COR BLD-ACNC: 0 IU/ML
M TB IFN-G CD4+ BCKGRND COR BLD-ACNC: 0.01 IU/ML
MITOGEN IGNF BCKGRD COR BLD-ACNC: >10 IU/ML

## 2020-11-28 ENCOUNTER — TRANSCRIBE ORDERS (OUTPATIENT)
Dept: URGENT CARE | Age: 50
End: 2020-11-28

## 2020-11-28 ENCOUNTER — APPOINTMENT (OUTPATIENT)
Dept: LAB | Age: 50
End: 2020-11-28

## 2020-11-28 DIAGNOSIS — Z01.84 IMMUNITY STATUS TESTING: Primary | ICD-10-CM

## 2020-11-28 DIAGNOSIS — Z01.84 IMMUNITY STATUS TESTING: ICD-10-CM

## 2020-11-28 PROCEDURE — 86735 MUMPS ANTIBODY: CPT

## 2020-11-28 PROCEDURE — 36415 COLL VENOUS BLD VENIPUNCTURE: CPT

## 2020-11-30 LAB — MUV IGG SER QL: NORMAL

## 2021-01-05 ENCOUNTER — TELEPHONE (OUTPATIENT)
Dept: INTERNAL MEDICINE CLINIC | Facility: CLINIC | Age: 51
End: 2021-01-05

## 2021-01-05 DIAGNOSIS — Z20.822 EXPOSURE TO COVID-19 VIRUS: ICD-10-CM

## 2021-01-05 PROCEDURE — U0003 INFECTIOUS AGENT DETECTION BY NUCLEIC ACID (DNA OR RNA); SEVERE ACUTE RESPIRATORY SYNDROME CORONAVIRUS 2 (SARS-COV-2) (CORONAVIRUS DISEASE [COVID-19]), AMPLIFIED PROBE TECHNIQUE, MAKING USE OF HIGH THROUGHPUT TECHNOLOGIES AS DESCRIBED BY CMS-2020-01-R: HCPCS | Performed by: INTERNAL MEDICINE

## 2021-01-05 NOTE — TELEPHONE ENCOUNTER
Brandt Armstrong called with a question regarding her appointment today, 01/05/2020  Her employer had asked her to go get covid testing, and she was wondering if she should cancel her appointment  Informed her that in order to be able to go get covid testing, she must be seen by a doctor to put in a script  Informed her that she should keep her appointment and ask the doctor she must get covid testing so that they may put in an order  Estrada Valdez was satisfied with the answer and ended the call

## 2021-01-06 LAB — SARS-COV-2 RNA SPEC QL NAA+PROBE: NOT DETECTED

## 2021-01-10 ENCOUNTER — IMMUNIZATIONS (OUTPATIENT)
Dept: FAMILY MEDICINE CLINIC | Facility: HOSPITAL | Age: 51
End: 2021-01-10

## 2021-01-10 DIAGNOSIS — Z23 ENCOUNTER FOR IMMUNIZATION: ICD-10-CM

## 2021-01-10 PROCEDURE — 91301 SARS-COV-2 / COVID-19 MRNA VACCINE (MODERNA) 100 MCG: CPT

## 2021-01-10 PROCEDURE — 0011A SARS-COV-2 / COVID-19 MRNA VACCINE (MODERNA) 100 MCG: CPT

## 2021-02-09 ENCOUNTER — IMMUNIZATIONS (OUTPATIENT)
Dept: FAMILY MEDICINE CLINIC | Facility: HOSPITAL | Age: 51
End: 2021-02-09

## 2021-02-09 DIAGNOSIS — Z23 ENCOUNTER FOR IMMUNIZATION: Primary | ICD-10-CM

## 2021-02-09 PROCEDURE — 91301 SARS-COV-2 / COVID-19 MRNA VACCINE (MODERNA) 100 MCG: CPT

## 2021-02-09 PROCEDURE — 0012A SARS-COV-2 / COVID-19 MRNA VACCINE (MODERNA) 100 MCG: CPT

## 2021-02-17 ENCOUNTER — TELEPHONE (OUTPATIENT)
Dept: ADMINISTRATIVE | Facility: OTHER | Age: 51
End: 2021-02-17

## 2021-02-17 ENCOUNTER — OFFICE VISIT (OUTPATIENT)
Dept: INTERNAL MEDICINE CLINIC | Facility: CLINIC | Age: 51
End: 2021-02-17
Payer: COMMERCIAL

## 2021-02-17 VITALS
DIASTOLIC BLOOD PRESSURE: 68 MMHG | BODY MASS INDEX: 32.4 KG/M2 | HEIGHT: 64 IN | TEMPERATURE: 97 F | WEIGHT: 189.8 LBS | SYSTOLIC BLOOD PRESSURE: 140 MMHG | HEART RATE: 64 BPM | OXYGEN SATURATION: 98 %

## 2021-02-17 DIAGNOSIS — R00.2 PALPITATIONS: ICD-10-CM

## 2021-02-17 DIAGNOSIS — R45.86 EMOTIONAL LABILITY: ICD-10-CM

## 2021-02-17 DIAGNOSIS — K21.9 GASTROESOPHAGEAL REFLUX DISEASE, UNSPECIFIED WHETHER ESOPHAGITIS PRESENT: ICD-10-CM

## 2021-02-17 DIAGNOSIS — Z76.89 ENCOUNTER TO ESTABLISH CARE: ICD-10-CM

## 2021-02-17 DIAGNOSIS — Z12.31 ENCOUNTER FOR SCREENING MAMMOGRAM FOR BREAST CANCER: Primary | ICD-10-CM

## 2021-02-17 DIAGNOSIS — Z12.4 SCREENING FOR CERVICAL CANCER: ICD-10-CM

## 2021-02-17 DIAGNOSIS — R55 SYNCOPE AND COLLAPSE: ICD-10-CM

## 2021-02-17 PROCEDURE — 99204 OFFICE O/P NEW MOD 45 MIN: CPT | Performed by: INTERNAL MEDICINE

## 2021-02-17 PROCEDURE — 93000 ELECTROCARDIOGRAM COMPLETE: CPT | Performed by: INTERNAL MEDICINE

## 2021-02-17 RX ORDER — OMEPRAZOLE 20 MG/1
20 CAPSULE, DELAYED RELEASE ORAL DAILY
Qty: 60 CAPSULE | Refills: 0 | Status: SHIPPED | OUTPATIENT
Start: 2021-02-17 | End: 2022-01-12

## 2021-02-17 NOTE — ASSESSMENT & PLAN NOTE
Patient has history of syncope with subsequent knee injury and need for surgery in 2019  Was found to be bradycardic at the time and was supposed to have Holter monitor placed but never followed up  Currently complains of palpitations       EKG  Stress test    Lipid panel

## 2021-02-17 NOTE — ASSESSMENT & PLAN NOTE
PMH:   Sarcoidosis in remission for 23 years  Was treated with steroids at the time  Has residual vision loss 2/a to this  Anemia: refers 2a to heavy menstrual periods  Currently having irregular periods at 2-3 month intervals  Colonoscopy 2-3 years ago   mamogram will be scheduled  Immunizations:   Refers getting tdap vaccine and influenza vaccine in november 2020   No shingles vaccine received, had chicken pox in infancy  Covid vaccine received  Denies PNA vaccine  DEXA scan unsure         Will order   CMP  CBC  EKG  Lipid profile

## 2021-02-17 NOTE — ASSESSMENT & PLAN NOTE
Patient has history of syncope with subsequent knee injury and need for surgery in 2019  Was found to be bradycardic at the time and was supposed to have Holter monitor placed but never followed up  Complains of palpitations without chest pain or sob that can occur at rest or exertion  Last episode a couple days ago

## 2021-02-17 NOTE — ASSESSMENT & PLAN NOTE
History of H pylori treated 20 years ago  Currently complains of acid reflux multiple times per week       omperazole 20mg q day

## 2021-02-17 NOTE — PROGRESS NOTES
Assessment/Plan:    Encounter to establish care  Patient has history of syncope with subsequent knee injury and need for surgery in 2019  Was found to be bradycardic at the time and was supposed to have Holter monitor placed but never followed up  Complains of palpitations without chest pain or sob that can occur at rest or exertion  Last episode a couple days ago  Encounter to establish care      PMH:   Sarcoidosis in remission for 23 years  Was treated with steroids at the time  Has residual vision loss 2/a to this  Anemia: refers 2a to heavy menstrual periods  Currently having irregular periods at 2-3 month intervals  Colonoscopy 2-3 years ago   mamogram will be scheduled  Immunizations:   Refers getting tdap vaccine and influenza vaccine in november 2020   No shingles vaccine received, had chicken pox in infancy  Covid vaccine received  Denies PNA vaccine  DEXA scan unsure  Will order   CMP  CBC  EKG  Lipid profile      Syncope and collapse  Patient has history of syncope with subsequent knee injury and need for surgery in 2019  Was found to be bradycardic at the time and was supposed to have Holter monitor placed but never followed up  Currently complains of palpitations  EKG  Stress test    Lipid panel     Palpitations  Patient has history of syncope with subsequent knee injury and need for surgery in 2019  Was found to be bradycardic at the time and was supposed to have Holter monitor placed but never followed up  Complains of palpitations without chest pain or sob that can occur at rest or exertion  Last episode a couple days ago  GERD (gastroesophageal reflux disease)  History of H pylori treated 20 years ago  Currently complains of acid reflux multiple times per week  omperazole 20mg q day    Emotional lability  Patient complaining of easy crying spells, less motivation, easily irritable  referral to mental health for assessment and treatment if necesarry  Diagnoses and all orders for this visit:    Encounter for screening mammogram for breast cancer  -     Mammo screening bilateral w 3d & cad; Future    Screening for cervical cancer  -     Ambulatory referral to Gynecology; Future    Gastroesophageal reflux disease, unspecified whether esophagitis present  -     omeprazole (PriLOSEC) 20 mg delayed release capsule; Take 1 capsule (20 mg total) by mouth daily  -     Ambulatory Referral to GI Endoscopy; Future    Syncope and collapse  -     Lipid Panel with Direct LDL reflex; Future  -     Stress test only, exercise; Future    Encounter to establish care  -     Comprehensive metabolic panel; Future  -     CBC and Platelet; Future  -     Protime-INR; Future  -     Protime (PT) and Partial Thromboplastin Time (PTT); Future  -     Cancel: ECG 12 lead; Future  -     HEMOGLOBIN A1C W/ EAG ESTIMATION; Future    Palpitations  -     Lipid Panel with Direct LDL reflex; Future  -     Cancel: ECG 12 lead; Future  -     HEMOGLOBIN A1C W/ EAG ESTIMATION; Future  -     POCT ECG    Emotional lability  -     Ambulatory referral to Psychiatry; Future          Subjective:  Feels ok    BMI Counseling: Body mass index is 32 4 kg/m²  The BMI is above normal  Nutrition recommendations include reducing portion sizes  Depression Screening Follow-up Plan: Patient's depression screening was positive with a PHQ-2 score of 5  Their PHQ-9 score was 19  Clinically patient does not have depression  No treatment is required  Patient ID: Jay Brochure is a 45 y o  female  HPI    The following portions of the patient's history were reviewed and updated as appropriate: allergies, current medications, past family history, past medical history, past social history, past surgical history and problem list     Review of Systems          Review of Systems   Constitutional: Positive for fatigue  Negative for appetite change, chills and fever  HENT: Negative    Negative for congestion, sinus pain, sore throat, tinnitus, trouble swallowing and voice change  Eyes: Negative  Respiratory: Negative for cough, chest tightness, shortness of breath and wheezing  Cardiovascular: Positive for palpitations  Negative for chest pain and leg swelling  Gastrointestinal: Negative for abdominal distention, abdominal pain, blood in stool, constipation, diarrhea, nausea and vomiting  Genitourinary: Negative for decreased urine volume, difficulty urinating, hematuria, menstrual problem, pelvic pain, urgency, vaginal bleeding, vaginal discharge and vaginal pain  Musculoskeletal: Negative for arthralgias, back pain, joint swelling and myalgias  Skin: Negative  Neurological: Positive for syncope  Negative for dizziness, seizures, light-headedness, numbness and headaches  Hematological: Bruises/bleeds easily  Psychiatric/Behavioral: Positive for dysphoric mood  Negative for sleep disturbance  The patient is nervous/anxious        Past Medical History:   Diagnosis Date    Anemia     Sarcoidosis          Allergies   Allergen Reactions    Penicillin G Hives           Past Surgical History:   Procedure Laterality Date    ANKLE SURGERY Right 2018    KNEE ARTHROSCOPY W/ MENISCAL REPAIR Left 2013    TUBAL LIGATION  1997             Family History   Problem Relation Age of Onset    Dementia Mother     Seizures Mother     Hypertension Mother     Osteoporosis Mother     Glaucoma Mother     Macular degeneration Mother     Hearing loss Mother     Hyperlipidemia Mother          Objective:      /68 (BP Location: Left arm, Patient Position: Sitting, Cuff Size: Adult)   Pulse 64   Temp (!) 97 °F (36 1 °C) (Tympanic)   Ht 5' 4 17" (1 63 m)   Wt 86 1 kg (189 lb 12 8 oz)   SpO2 98% Comment: ra  BMI 32 40 kg/m²     Recent Results (from the past 1344 hour(s))   Novel Coronavirus (COVID-19), PCR [facility] [id number] [facility]    Collection Time: 01/05/21 11:41 AM    Specimen: Nose; Nares Result Value Ref Range    SARS-CoV-2  Not Detected Not Detected        Physical Exam      Physical Exam  Constitutional:       Appearance: She is obese  She is not ill-appearing  HENT:      Head: Normocephalic  Nose: Nose normal       Mouth/Throat:      Mouth: Mucous membranes are moist    Eyes:      General: No scleral icterus  Extraocular Movements: Extraocular movements intact  Conjunctiva/sclera: Conjunctivae normal       Pupils: Pupils are equal, round, and reactive to light  Neck:      Musculoskeletal: Normal range of motion and neck supple  Cardiovascular:      Rate and Rhythm: Normal rate and regular rhythm  Pulses: Normal pulses  Heart sounds: Normal heart sounds  No murmur  No gallop  Pulmonary:      Effort: Pulmonary effort is normal       Breath sounds: Normal breath sounds  Abdominal:      General: Abdomen is flat  Bowel sounds are normal  There is no distension  Palpations: Abdomen is soft  Tenderness: There is no abdominal tenderness  There is no right CVA tenderness, left CVA tenderness or guarding  Musculoskeletal: Normal range of motion  Skin:     Capillary Refill: Capillary refill takes less than 2 seconds  Neurological:      General: No focal deficit present  Mental Status: She is alert and oriented to person, place, and time  Psychiatric:         Mood and Affect: Mood normal          Behavior: Behavior normal          Gen: NAD  Heent: atraumatic, normocephalic  Mouth: is moist  Neck: is supple, no JVD, no carotid bruits     Heart: is regular Normal s1, s2,  Lungs: are clear to auscultation  Abd: soft, non tender, NABS  Ext: no edema, distal pulses normal  Skin: no rashes, ulcers  Mood: normal affect  Neuro: AAOx3

## 2021-02-17 NOTE — PROGRESS NOTES
Assessment/Plan:    Encounter to establish care    Patient has history of syncope with subsequent knee injury and need for surgery in 2019  Was found to be bradycardic at the time and was supposed to have Holter monitor placed but never followed up  Complains of palpitations without chest pain or sob that can occur at rest or exertion  Last episode a couple days ago  PMH:   H pylori treated 20 years ago  Sarcoidosis in remission for 23 years  Was treated with steroids at the time  Has residual vision loss 2/a to this  Anemia: refers 2a to heavy menstrual periods  Currently having irregular periods at 2-3 month intervals  Colonoscopy 2-3 years ago   mamogram will be scheduled  Immunizations:   Refers getting tdap vaccine  No shingles vaccine, had chicken pox in infancy  Denies PNA vaccine  Covid vaccine received  DEXA scan unsure  Diagnoses and all orders for this visit:    Encounter for screening mammogram for breast cancer  -     Mammo screening bilateral w 3d & cad; Future    Screening for cervical cancer  -     Ambulatory referral to Gynecology; Future    Gastroesophageal reflux disease, unspecified whether esophagitis present  -omeprazole q day  -endoscopy  Syncope and collapse  -ekg, stress test    Encounter to establish care  Routine lab work  Palpitations  Ekg shows prolonged qt interval and sinus bradycardia   stress test    Emotional lability  Phq 9 score of 19  Referral to mental health professional for assessment  Subjective: feels ok     BMI Counseling: Body mass index is 32 4 kg/m²  The BMI is above normal  Nutrition recommendations include reducing portion sizes  Exercise recommendations include vigorous aerobic physical activity for 75 minutes/week  Depression Screening Follow-up Plan: Patient's depression screening was positive with a PHQ-2 score of 5  Their PHQ-9 score was 19   Clinically patient does not have depression  No treatment is required  Patient ID: Steve Rivers is a 45 y o  female  46 yo female presents to clinic to establish care  No previous records available  The following portions of the patient's history were reviewed and updated as appropriate: allergies, current medications, past family history, past medical history, past social history, past surgical history and problem list     Review of Systems   Constitutional: Positive for fatigue  Negative for appetite change, chills and fever  HENT: Negative  Negative for congestion, sinus pain, sore throat, tinnitus, trouble swallowing and voice change  Eyes: Negative  Respiratory: Negative for cough, chest tightness, shortness of breath and wheezing  Cardiovascular: Positive for palpitations  Negative for chest pain and leg swelling  Gastrointestinal: Negative for abdominal distention, abdominal pain, blood in stool, constipation, diarrhea, nausea and vomiting  Genitourinary: Negative for decreased urine volume, difficulty urinating, hematuria, menstrual problem, pelvic pain, urgency, vaginal bleeding, vaginal discharge and vaginal pain  Musculoskeletal: Negative for arthralgias, back pain, joint swelling and myalgias  Skin: Negative  Neurological: Positive for syncope  Negative for dizziness, seizures, light-headedness, numbness and headaches  Hematological: Bruises/bleeds easily  Psychiatric/Behavioral: Positive for dysphoric mood  Negative for sleep disturbance  The patient is nervous/anxious

## 2021-02-17 NOTE — ASSESSMENT & PLAN NOTE
Patient complaining of easy crying spells, less motivation, easily irritable  referral to mental health for assessment and treatment if necesarry

## 2021-02-17 NOTE — TELEPHONE ENCOUNTER
----- Message from [facility] sent at 2/17/2021 10:12 AM EST -----  Regarding: CRC [facility]  02/17/21 10:12 AM    Hello, our patient Milad Dougherty has had CRC: Colonoscopy completed/performed  Please assist in updating the patient chart by making an External outreach to Dr Hayley Hobbs [address] [facility]  in [address] located in [phone]  The date of service is 2 years ago      Thank you,  [address]

## 2021-02-17 NOTE — PATIENT INSTRUCTIONS
Referral to gastro for endoscopy  Referal for stress test    Lab work   Referal Mental health professional   Start taking omeprazole once a day in the morning      Follow up in 6 weeks

## 2021-02-18 NOTE — TELEPHONE ENCOUNTER
Upon review of the In Basket request we were able to locate, review, and update the patient chart as requested for CRC: Colonoscopy  Any additional questions or concerns should be emailed to the Practice Liaisons via [email or web address] email, please do not reply via In Basket      Thank you  Bri Desai MA

## 2021-03-03 ENCOUNTER — HOSPITAL ENCOUNTER (OUTPATIENT)
Dept: MAMMOGRAPHY | Facility: CLINIC | Age: 51
Discharge: HOME | End: 2021-03-03
Payer: COMMERCIAL

## 2021-03-03 DIAGNOSIS — Z12.31 ENCOUNTER FOR SCREENING MAMMOGRAM FOR BREAST CANCER: ICD-10-CM

## 2021-03-03 PROCEDURE — 77063 BREAST TOMOSYNTHESIS BI: CPT

## 2021-03-03 PROCEDURE — 77067 SCR MAMMO BI INCL CAD: CPT

## 2021-03-04 ENCOUNTER — TELEPHONE (OUTPATIENT)
Dept: PSYCHIATRY | Facility: CLINIC | Age: 51
End: 2021-03-04

## 2021-03-04 ENCOUNTER — TELEPHONE (OUTPATIENT)
Dept: INTERNAL MEDICINE CLINIC | Facility: CLINIC | Age: 51
End: 2021-03-04

## 2021-03-04 NOTE — TELEPHONE ENCOUNTER
Discussed the patient's request with Dr Yamilet Andres  He will address this tomorrow  He will re-evaluate the labs ordered for medical necessity and assign diagnoses

## 2021-03-04 NOTE — TELEPHONE ENCOUNTER
Behavorial Health Outpatient Intake Questions    Referred by: PCP    Please advised interviewee that they need to answer all questions truthfully to allow for best care and any misrepresentations of information may affect their ability to be seen at this clinic   => Was this discussed? Yes     BehavGeneral acute hospital Health Outpatient Intake History -     Presenting Problem (in patient's words):   Depression, Stress, anger, mood swings on and off  Are there any developmental disabilities? ? If yes, can they speak to you on the phone? If they are too limited to speak to you on phone, refer out No    Are you taking any psychiatric medications? No    => If yes, who prescribes? If yes, are they injectable medications? Does the patient have a language barrier or hearing impairment? No    Have you been treated at [facility] by a therapist or a doctor in the past? If yes, who? No    Has the patient been hospitalized for mental health? No   If yes, how long ago was last hospitalization and where was it? Do you actively use alcohol or marijuana or illegal substances? If yes, what and how much - refer out to Drug and alcohol treatment if use is excessive or daily use of illegal substances No concerns of substance abuse are reported  Do you have a community treatment team or ? No    Legal History-     Does the patient have any history of arrests, long-term/detention time, or DUIs? No  If Yes-  1) What types of charges? 2) When were they last incarcerated? 3) Are they currently on parole or probation? Minor Child-    Who has custody of the child? Is there a custody agreement? If there is a custody agreement remind parent that they must bring a copy to the first appt or they will not be seen       Intake Team, please check with provider before scheduling if flags come up such as:  - complex case  - legal history (other than DUI)  - communication barrier concerns are present  - if, in your judgment, this needs further review    ACCEPTED as a patient Yes  => Appointment Date: Tuesday, April 27th, 2021 at 10:00 a m with Dr Haleigh Treadwell? No    Name of Insurance Co: [address] [place] [address] ID# [id number]  YAHZURYOU Phone #  If ins is primary or secondary  If patient is a minor, parents information such as Name, D  O B of guarantor

## 2021-03-10 ENCOUNTER — TELEPHONE (OUTPATIENT)
Dept: BEHAVIORAL/MENTAL HEALTH CLINIC | Facility: CLINIC | Age: 51
End: 2021-03-10

## 2021-03-10 NOTE — TELEPHONE ENCOUNTER
I see the intake was done but the patient was not scheduled yet with anyone can someone please call her and schedule her thank you

## 2021-03-15 ENCOUNTER — HOSPITAL ENCOUNTER (OUTPATIENT)
Dept: NON INVASIVE DIAGNOSTICS | Facility: HOSPITAL | Age: 51
Discharge: HOME | End: 2021-03-15
Payer: COMMERCIAL

## 2021-03-15 DIAGNOSIS — R55 SYNCOPE AND COLLAPSE: ICD-10-CM

## 2021-03-15 PROCEDURE — 93016 CV STRESS TEST SUPVJ ONLY: CPT

## 2021-03-15 PROCEDURE — 93018 CV STRESS TEST I&R ONLY: CPT

## 2021-03-15 PROCEDURE — 93017 CV STRESS TEST TRACING ONLY: CPT

## 2021-03-16 LAB
CHEST PAIN STATEMENT: NORMAL
MAX DIASTOLIC BP: 86 MMHG
MAX HEART RATE: 144 BPM
MAX PREDICTED HEART RATE: 169 BPM
MAX. SYSTOLIC BP: 196 MMHG
PROTOCOL NAME: NORMAL
TARGET HR FORMULA: NORMAL
TEST INDICATION: NORMAL
TIME IN EXERCISE PHASE: NORMAL

## 2021-03-24 ENCOUNTER — TELEPHONE (OUTPATIENT)
Dept: INTERNAL MEDICINE CLINIC | Facility: CLINIC | Age: 51
End: 2021-03-24

## 2021-03-24 DIAGNOSIS — R00.2 PALPITATIONS: Primary | ICD-10-CM

## 2021-03-24 NOTE — TELEPHONE ENCOUNTER
Patient received a letter from cardiology stating her stress test was abnormal   Patient called to find out what Dr Ronda Ferguson would like for her to do regarding her abnormal results  Please advise

## 2021-04-01 ENCOUNTER — TRANSCRIBE ORDERS (OUTPATIENT)
Dept: ADMINISTRATIVE | Facility: HOSPITAL | Age: 51
End: 2021-04-01

## 2021-04-01 ENCOUNTER — HOSPITAL ENCOUNTER (OUTPATIENT)
Dept: MAMMOGRAPHY | Facility: CLINIC | Age: 51
Discharge: HOME | End: 2021-04-01
Payer: COMMERCIAL

## 2021-04-01 VITALS — HEIGHT: 64 IN | WEIGHT: 189 LBS | BODY MASS INDEX: 32.27 KG/M2

## 2021-04-01 DIAGNOSIS — R92.8 ABNORMAL MAMMOGRAM: ICD-10-CM

## 2021-04-01 DIAGNOSIS — Z09 FOLLOW-UP EXAM, 3-6 MONTHS SINCE PREVIOUS EXAM: Primary | ICD-10-CM

## 2021-04-01 PROCEDURE — 76642 ULTRASOUND BREAST LIMITED: CPT

## 2021-04-02 ENCOUNTER — CONSULT (OUTPATIENT)
Dept: CARDIOLOGY CLINIC | Facility: CLINIC | Age: 51
End: 2021-04-02
Payer: COMMERCIAL

## 2021-04-02 VITALS
BODY MASS INDEX: 33.49 KG/M2 | HEART RATE: 66 BPM | DIASTOLIC BLOOD PRESSURE: 66 MMHG | SYSTOLIC BLOOD PRESSURE: 148 MMHG | HEIGHT: 63 IN | WEIGHT: 189 LBS

## 2021-04-02 DIAGNOSIS — R07.89 OTHER CHEST PAIN: ICD-10-CM

## 2021-04-02 DIAGNOSIS — R00.2 PALPITATIONS: ICD-10-CM

## 2021-04-02 DIAGNOSIS — R55 SYNCOPE AND COLLAPSE: ICD-10-CM

## 2021-04-02 DIAGNOSIS — R94.39 ABNORMAL STRESS ECG: Primary | ICD-10-CM

## 2021-04-02 PROCEDURE — 99244 OFF/OP CNSLTJ NEW/EST MOD 40: CPT | Performed by: INTERNAL MEDICINE

## 2021-04-02 PROCEDURE — 93000 ELECTROCARDIOGRAM COMPLETE: CPT | Performed by: INTERNAL MEDICINE

## 2021-04-02 RX ORDER — DIPHENOXYLATE HYDROCHLORIDE AND ATROPINE SULFATE 2.5; .025 MG/1; MG/1
1 TABLET ORAL DAILY
COMMUNITY
End: 2022-01-12

## 2021-04-02 NOTE — PROGRESS NOTES
Alida James  1970  [id number]  [facility] [address]  [phone]    1  Abnormal stress ECG  Cardiac catheterization   2  Palpitations  Ambulatory referral to Cardiology    Cardiac catheterization   3  Syncope and collapse     4  Other chest pain  POCT ECG    Cardiac catheterization       Discussion/Summary:  1  Abnormal Stress ECG  2  Chest pain  3  Remote Syncope  4  Palpitations    Rec: Today is my 1st visit with the patient  She has had chest pain and palpitations on and off for several weeks  She underwent a treadmill stress test which showed an ischemic response  Recommend left heart catheterization to define coronary anatomy  We talked about cardiac CTA but she would like to know for sure and have 1 test where she can have potential blockages fixed  Blood pressure will be monitored  Recent blood work an echocardiogram will be obtained from prior cardiologist in [place]  Interval History:   58-year-old female with past history of syncope, palpitations presents for new patient evaluation  In 2018 she had an episode of syncope she was evaluated by Cardiology no significant abnormalities were determine but she had no follow-up after that  Over the last several weeks she has had some chest pain which he describes as a burning sensation in the center of the chest with some radiation upwards towards the neck  She does get short of breath with activity and her functional capacity has been dropping over the past 6 months  She has gained a little bit of weight during the pandemic but does not feel that this is the likely cause  She has had palpitations where she feels her heart racing and fluttering for no particular reason  There has been no further syncope  She has no significant family history, caffeine intake, alcohol intake  She does have a remote smoking history      Problem List     Exposure to COVID-19 virus    Encounter to establish care    Syncope and collapse    Palpitations    GERD (gastroesophageal reflux disease)    Overview Deleted 2021 11:12 AM by Andreina Sanchez MD            Emotional lability    Other chest pain    Abnormal stress ECG        Past Medical History:   Diagnosis Date    Anemia     Sarcoidosis      Social History     Socioeconomic History    Marital status: Registered Domestic Partner     Spouse name: Not on file    Number of children: Not on file    Years of education: Not on file    Highest education level: Not on file   Occupational History    Not on file   Social Needs    Financial resource strain: Not on file    Food insecurity     Worry: Not on file     Inability: Not on file   [employer] needs     Medical: Not on file     Non-medical: Not on file   Tobacco Use    Smoking status: Former Smoker     Packs/day: 0 25     Years: 20 00     Pack years: 5 00     Types: Cigarettes     Quit date:      Years since quittin 2    Smokeless tobacco: Never Used   Substance and Sexual Activity    Alcohol use: Yes     Comment: socially    Drug use: Never    Sexual activity: Not on file   Lifestyle    Physical activity     Days per week: Not on file     Minutes per session: Not on file    Stress: Not on file   Relationships    Social connections     Talks on phone: Not on file     Gets together: Not on file     Attends Gnosticism service: Not on file     Active member of club or organization: Not on file     Attends meetings of clubs or organizations: Not on file     Relationship status: Not on file    Intimate partner violence     Fear of current or ex partner: Not on file     Emotionally abused: Not on file     Physically abused: Not on file     Forced sexual activity: Not on file   Other Topics Concern    Not on file   Social History Narrative    Not on file      Family History   Problem Relation Age of Onset    Dementia Mother     Seizures Mother    [place] Gr[place] Hypertension Mother     Osteoporosis Mother    [facility] Glaucoma Mother     Macular degeneration Mother     Hearing loss Mother     Hyperlipidemia Mother     No Known Problems Sister     No Known Problems Daughter     No Known Problems Maternal Grandmother     No Known Problems Maternal Grandfather     No Known Problems Daughter     No Known Problems Maternal Aunt      Past Surgical History:   Procedure Laterality Date    ANKLE SURGERY Right 2018    KNEE ARTHROSCOPY W/ MENISCAL REPAIR Left 2013    TUBAL LIGATION  1997       Current Outpatient Medications:     Ascorbic Acid (VITAMIN C PO), Take 1 tablet by mouth daily, Disp: , Rfl:     Biotin 5 MG/ML LIQD, Take 1 mL by mouth daily , Disp: , Rfl:     multivitamin (THERAGRAN) TABS, Take 1 tablet by mouth daily, Disp: , Rfl:     omeprazole (PriLOSEC) 20 mg delayed release capsule, Take 1 capsule (20 mg total) by mouth daily, Disp: 60 capsule, Rfl: 0  Allergies   Allergen Reactions    Penicillin G Hives       Labs:     Chemistry    No results found for: NA, K, CL, CO2, BUN, CREATININE No results found for: CALCIUM, ALKPHOS, AST, ALT, BILITOT         No results found for: CHOL  No results found for: HDL  No results found for: LDLCALC  No results found for: TRIG  No results found for: CHOLHDL    Imaging: Us Breast Left Limited (diagnostic)    Result Date: 4/1/2021  Narrative: DIAGNOSIS: Abnormal mammogram TECHNIQUE: Ultrasound of the left breast(s) was performed  COMPARISONS: None available  RELEVANT HISTORY: Family Breast Cancer History: No known family history of breast cancer  Family Medical History: No known relevant family medical history  Personal History: No known relevant hormone history  No known relevant surgical history  No known relevant medical history  RISK ASSESSMENT: 5 Year Tyrer-Cuzick: 1 2 % 10 Year Tyrer-Cuzick: 2 55 % Lifetime Tyrer-Cuzick: 10 42 % INDICATION: Atiya Leung is a 45 y o  female presenting for abnormal screening   FINDINGS: Targeted sonographic evaluation of the left breast 2 o'clock position 6 cm nipple demonstrates a 5 x 6 x 3 mm hypoechoic mass with echogenic central portion  There is a suggestion of hilar flow  Findings likely represent an intramammary lymph node  Recommend six-month follow-up mammogram and ultrasound evaluate for stability/resolution of this finding  There are no suspicious masses or areas of architectural distortion  Impression:  Probable left breast intramammary lymph node  Recommend six-month follow-up mammogram and ultrasound evaluate for stability/resolution  ASSESSMENT/BI-RADS CATEGORY: Left: 3 - Probably Benign Overall: 3 - Probably Benign RECOMMENDATION:      - Diagnostic mammogram in 6 months for the left breast       - Ultrasound in 6 months for the left breast  Workstation ID: [id number]      ECG:    Sinus rhythm nonspecific ST changes      ROS    Vitals:    04/02/21 1601   BP: 148/66   Pulse: 66     Vitals:    04/02/21 1601   Weight: 85 7 kg (189 lb)     Height: 5' 3" (160 cm)   Body mass index is 33 48 kg/m²  Physical Exam:  Vital signs reviewed  General appearance:  Appears stated age, alert, well appearing and in no distress  HEENT:  PERRLA, EOMI, no scleral icterus, no conjunctival pallor  NECK:  Supple, No elevated JVP, no thyromegaly, no carotid bruits, no JVD  HEART:  Regular rate and rhythm, normal S1/S2, no S3/S4, no murmur or rub, PMI nondisplaced  LUNGS:  Clear to auscultation bilaterally, no wheezes rales or rhonchi  ABDOMEN:  Soft, non-tender, positive bowel sounds, no rebound or guarding, no organomegaly   EXTREMITIES:  Normal range of motion  No clubbing or cyanosis   No edema  VASCULAR:  Normal pedal pulses   SKIN: No lesions or rashes on exposed skin  NEURO:  CN II-XII intact, no focal deficits

## 2021-04-02 NOTE — H&P (VIEW-ONLY)
Collins Lesch  1970  [id number]  [facility] [address]  [phone]    1  Abnormal stress ECG  Cardiac catheterization   2  Palpitations  Ambulatory referral to Cardiology    Cardiac catheterization   3  Syncope and collapse     4  Other chest pain  POCT ECG    Cardiac catheterization       Discussion/Summary:  1  Abnormal Stress ECG  2  Chest pain  3  Remote Syncope  4  Palpitations    Rec: Today is my 1st visit with the patient  She has had chest pain and palpitations on and off for several weeks  She underwent a treadmill stress test which showed an ischemic response  Recommend left heart catheterization to define coronary anatomy  We talked about cardiac CTA but she would like to know for sure and have 1 test where she can have potential blockages fixed  Blood pressure will be monitored  Recent blood work an echocardiogram will be obtained from prior cardiologist in [place]  Interval History:   50-year-old female with past history of syncope, palpitations presents for new patient evaluation  In 2018 she had an episode of syncope she was evaluated by Cardiology no significant abnormalities were determine but she had no follow-up after that  Over the last several weeks she has had some chest pain which he describes as a burning sensation in the center of the chest with some radiation upwards towards the neck  She does get short of breath with activity and her functional capacity has been dropping over the past 6 months  She has gained a little bit of weight during the pandemic but does not feel that this is the likely cause  She has had palpitations where she feels her heart racing and fluttering for no particular reason  There has been no further syncope  She has no significant family history, caffeine intake, alcohol intake  She does have a remote smoking history      Problem List     Exposure to COVID-19 virus    Encounter to establish care    Syncope and collapse    Palpitations    GERD (gastroesophageal reflux disease)    Overview Deleted 2021 11:12 AM by Poncho Singh MD            Emotional lability    Other chest pain    Abnormal stress ECG        Past Medical History:   Diagnosis Date    Anemia     Sarcoidosis      Social History     Socioeconomic History    Marital status: Registered Domestic Partner     Spouse name: Not on file    Number of children: Not on file    Years of education: Not on file    Highest education level: Not on file   Occupational History    Not on file   Social Needs    Financial resource strain: Not on file    Food insecurity     Worry: Not on file     Inability: Not on file   [employer] needs     Medical: Not on file     Non-medical: Not on file   Tobacco Use    Smoking status: Former Smoker     Packs/day: 0 25     Years: 20 00     Pack years: 5 00     Types: Cigarettes     Quit date:      Years since quittin 2    Smokeless tobacco: Never Used   Substance and Sexual Activity    Alcohol use: Yes     Comment: socially    Drug use: Never    Sexual activity: Not on file   Lifestyle    Physical activity     Days per week: Not on file     Minutes per session: Not on file    Stress: Not on file   Relationships    Social connections     Talks on phone: Not on file     Gets together: Not on file     Attends Yazidi service: Not on file     Active member of club or organization: Not on file     Attends meetings of clubs or organizations: Not on file     Relationship status: Not on file    Intimate partner violence     Fear of current or ex partner: Not on file     Emotionally abused: Not on file     Physically abused: Not on file     Forced sexual activity: Not on file   Other Topics Concern    Not on file   Social History Narrative    Not on file      Family History   Problem Relation Age of Onset    Dementia Mother     Seizures Mother    [place] [employer] Hypertension Mother     Osteoporosis Mother    Ciaran Liming Glaucoma Mother     Macular degeneration Mother     Hearing loss Mother     Hyperlipidemia Mother     No Known Problems Sister     No Known Problems Daughter     No Known Problems Maternal Grandmother     No Known Problems Maternal Grandfather     No Known Problems Daughter     No Known Problems Maternal Aunt      Past Surgical History:   Procedure Laterality Date    ANKLE SURGERY Right 2018    KNEE ARTHROSCOPY W/ MENISCAL REPAIR Left 2013    TUBAL LIGATION  1997       Current Outpatient Medications:     Ascorbic Acid (VITAMIN C PO), Take 1 tablet by mouth daily, Disp: , Rfl:     Biotin 5 MG/ML LIQD, Take 1 mL by mouth daily , Disp: , Rfl:     multivitamin (THERAGRAN) TABS, Take 1 tablet by mouth daily, Disp: , Rfl:     omeprazole (PriLOSEC) 20 mg delayed release capsule, Take 1 capsule (20 mg total) by mouth daily, Disp: 60 capsule, Rfl: 0  Allergies   Allergen Reactions    Penicillin G Hives       Labs:     Chemistry    No results found for: NA, K, CL, CO2, BUN, CREATININE No results found for: CALCIUM, ALKPHOS, AST, ALT, BILITOT         No results found for: CHOL  No results found for: HDL  No results found for: LDLCALC  No results found for: TRIG  No results found for: CHOLHDL    Imaging: Us Breast Left Limited (diagnostic)    Result Date: 4/1/2021  Narrative: DIAGNOSIS: Abnormal mammogram TECHNIQUE: Ultrasound of the left breast(s) was performed  COMPARISONS: None available  RELEVANT HISTORY: Family Breast Cancer History: No known family history of breast cancer  Family Medical History: No known relevant family medical history  Personal History: No known relevant hormone history  No known relevant surgical history  No known relevant medical history  RISK ASSESSMENT: 5 Year Tyrer-Cuzick: 1 2 % 10 Year Tyrer-Cuzick: 2 55 % Lifetime Tyrer-Cuzick: 10 42 % INDICATION: Ceferino Bran is a 45 y o  female presenting for abnormal screening   FINDINGS: Targeted sonographic evaluation of the left breast 2 o'clock position 6 cm nipple demonstrates a 5 x 6 x 3 mm hypoechoic mass with echogenic central portion  There is a suggestion of hilar flow  Findings likely represent an intramammary lymph node  Recommend six-month follow-up mammogram and ultrasound evaluate for stability/resolution of this finding  There are no suspicious masses or areas of architectural distortion  Impression:  Probable left breast intramammary lymph node  Recommend six-month follow-up mammogram and ultrasound evaluate for stability/resolution  ASSESSMENT/BI-RADS CATEGORY: Left: 3 - Probably Benign Overall: 3 - Probably Benign RECOMMENDATION:      - Diagnostic mammogram in 6 months for the left breast       - Ultrasound in 6 months for the left breast  Workstation ID: [id number]      ECG:    Sinus rhythm nonspecific ST changes      ROS    Vitals:    04/02/21 1601   BP: 148/66   Pulse: 66     Vitals:    04/02/21 1601   Weight: 85 7 kg (189 lb)     Height: 5' 3" (160 cm)   Body mass index is 33 48 kg/m²  Physical Exam:  Vital signs reviewed  General appearance:  Appears stated age, alert, well appearing and in no distress  HEENT:  PERRLA, EOMI, no scleral icterus, no conjunctival pallor  NECK:  Supple, No elevated JVP, no thyromegaly, no carotid bruits, no JVD  HEART:  Regular rate and rhythm, normal S1/S2, no S3/S4, no murmur or rub, PMI nondisplaced  LUNGS:  Clear to auscultation bilaterally, no wheezes rales or rhonchi  ABDOMEN:  Soft, non-tender, positive bowel sounds, no rebound or guarding, no organomegaly   EXTREMITIES:  Normal range of motion  No clubbing or cyanosis   No edema  VASCULAR:  Normal pedal pulses   SKIN: No lesions or rashes on exposed skin  NEURO:  CN II-XII intact, no focal deficits

## 2021-04-05 ENCOUNTER — TRANSCRIBE ORDERS (OUTPATIENT)
Dept: ADMINISTRATIVE | Facility: HOSPITAL | Age: 51
End: 2021-04-05

## 2021-04-05 ENCOUNTER — APPOINTMENT (OUTPATIENT)
Dept: LAB | Facility: HOSPITAL | Age: 51
End: 2021-04-05
Payer: COMMERCIAL

## 2021-04-05 ENCOUNTER — TELEPHONE (OUTPATIENT)
Dept: CARDIOLOGY CLINIC | Facility: CLINIC | Age: 51
End: 2021-04-05

## 2021-04-05 DIAGNOSIS — R55 SYNCOPE AND COLLAPSE: ICD-10-CM

## 2021-04-05 DIAGNOSIS — R00.2 PALPITATIONS: ICD-10-CM

## 2021-04-05 DIAGNOSIS — Z76.89 ENCOUNTER TO ESTABLISH CARE: Primary | ICD-10-CM

## 2021-04-05 DIAGNOSIS — Z76.89 ENCOUNTER TO ESTABLISH CARE: ICD-10-CM

## 2021-04-05 LAB
ALBUMIN SERPL BCP-MCNC: 3.5 G/DL (ref 3.5–5)
ALP SERPL-CCNC: 103 U/L (ref 46–116)
ALT SERPL W P-5'-P-CCNC: 20 U/L (ref 12–78)
ANION GAP SERPL CALCULATED.3IONS-SCNC: 8 MMOL/L (ref 4–13)
APTT PPP: 28 SECONDS (ref 23–37)
AST SERPL W P-5'-P-CCNC: 19 U/L (ref 5–45)
BILIRUB SERPL-MCNC: 0.2 MG/DL (ref 0.2–1)
BUN SERPL-MCNC: 14 MG/DL (ref 5–25)
CALCIUM SERPL-MCNC: 8.8 MG/DL (ref 8.3–10.1)
CHLORIDE SERPL-SCNC: 106 MMOL/L (ref 100–108)
CHOLEST SERPL-MCNC: 207 MG/DL (ref 50–200)
CO2 SERPL-SCNC: 25 MMOL/L (ref 21–32)
CREAT SERPL-MCNC: 0.89 MG/DL (ref 0.6–1.3)
ERYTHROCYTE [DISTWIDTH] IN BLOOD BY AUTOMATED COUNT: 18.8 % (ref 11.6–15.1)
EST. AVERAGE GLUCOSE BLD GHB EST-MCNC: 108 MG/DL
GFR SERPL CREATININE-BSD FRML MDRD: 87 ML/MIN/1.73SQ M
GLUCOSE P FAST SERPL-MCNC: 85 MG/DL (ref 65–99)
HBA1C MFR BLD: 5.4 %
HCT VFR BLD AUTO: 41.3 % (ref 34.8–46.1)
HDLC SERPL-MCNC: 42 MG/DL
HGB BLD-MCNC: 11.9 G/DL (ref 11.5–15.4)
INR PPP: 1.11 (ref 0.84–1.19)
LDLC SERPL CALC-MCNC: 120 MG/DL (ref 0–100)
MCH RBC QN AUTO: 23.8 PG (ref 26.8–34.3)
MCHC RBC AUTO-ENTMCNC: 28.8 G/DL (ref 31.4–37.4)
MCV RBC AUTO: 83 FL (ref 82–98)
PLATELET # BLD AUTO: 221 THOUSANDS/UL (ref 149–390)
PMV BLD AUTO: 11.5 FL (ref 8.9–12.7)
POTASSIUM SERPL-SCNC: 4.5 MMOL/L (ref 3.5–5.3)
PROT SERPL-MCNC: 7.4 G/DL (ref 6.4–8.2)
PROTHROMBIN TIME: 14.1 SECONDS (ref 11.6–14.5)
RBC # BLD AUTO: 4.99 MILLION/UL (ref 3.81–5.12)
SODIUM SERPL-SCNC: 139 MMOL/L (ref 136–145)
TRIGL SERPL-MCNC: 226 MG/DL
TSH SERPL DL<=0.05 MIU/L-ACNC: 1.29 UIU/ML (ref 0.36–3.74)
WBC # BLD AUTO: 5.51 THOUSAND/UL (ref 4.31–10.16)

## 2021-04-05 PROCEDURE — 85610 PROTHROMBIN TIME: CPT

## 2021-04-05 PROCEDURE — 85027 COMPLETE CBC AUTOMATED: CPT

## 2021-04-05 PROCEDURE — 80053 COMPREHEN METABOLIC PANEL: CPT

## 2021-04-05 PROCEDURE — 83036 HEMOGLOBIN GLYCOSYLATED A1C: CPT

## 2021-04-05 PROCEDURE — 85730 THROMBOPLASTIN TIME PARTIAL: CPT

## 2021-04-05 PROCEDURE — 84443 ASSAY THYROID STIM HORMONE: CPT

## 2021-04-05 PROCEDURE — 36415 COLL VENOUS BLD VENIPUNCTURE: CPT

## 2021-04-05 PROCEDURE — 80061 LIPID PANEL: CPT

## 2021-04-05 NOTE — TELEPHONE ENCOUNTER
COVID Pre-Visit Screening     1  Is this a family member screening? Yes  2  Have you traveled outside of your state in the past 2 weeks? No  3  Do you presently have a fever or flu-like symptoms? No  4  Do you have symptoms of an upper respiratory infection like runny nose, sore throat, or cough? No  5  Are you suffering from new headache that you have not had in the past?  No  6  Do you have/have you experienced any new shortness of breath recently? No  7  Do you have any new diarrhea, nausea or vomiting? No  8  Have you been in contact with anyone who has been sick or diagnosed with COVID-19? No  9  Do you have any new loss of taste or smell? No  10  Are you able to wear a mask without a valve for the entire visit? Yes       Patient schedule LHC  at [facility] on 4/9/21 with Dr Laney Painting  Patient aware of general instructions    Please susan can you check for insurance approval

## 2021-04-06 NOTE — TELEPHONE ENCOUNTER
He does not need auth for his [facility] [id number] or [id number] on 4/9/21 at [facility] at [address] [place] [address]  Ref# [id number]

## 2021-04-07 ENCOUNTER — OFFICE VISIT (OUTPATIENT)
Dept: INTERNAL MEDICINE CLINIC | Facility: CLINIC | Age: 51
End: 2021-04-07
Payer: COMMERCIAL

## 2021-04-07 ENCOUNTER — ANNUAL EXAM (OUTPATIENT)
Dept: OBGYN CLINIC | Facility: CLINIC | Age: 51
End: 2021-04-07
Payer: COMMERCIAL

## 2021-04-07 VITALS
WEIGHT: 190 LBS | DIASTOLIC BLOOD PRESSURE: 76 MMHG | BODY MASS INDEX: 32.44 KG/M2 | HEIGHT: 64 IN | SYSTOLIC BLOOD PRESSURE: 144 MMHG

## 2021-04-07 VITALS
HEIGHT: 64 IN | DIASTOLIC BLOOD PRESSURE: 60 MMHG | TEMPERATURE: 98.3 F | OXYGEN SATURATION: 98 % | BODY MASS INDEX: 32.44 KG/M2 | WEIGHT: 190 LBS | HEART RATE: 52 BPM | SYSTOLIC BLOOD PRESSURE: 132 MMHG

## 2021-04-07 DIAGNOSIS — N93.9 ABNORMAL UTERINE BLEEDING (AUB): ICD-10-CM

## 2021-04-07 DIAGNOSIS — R55 SYNCOPE AND COLLAPSE: ICD-10-CM

## 2021-04-07 DIAGNOSIS — N63.0 BREAST MASS: ICD-10-CM

## 2021-04-07 DIAGNOSIS — K21.9 GASTROESOPHAGEAL REFLUX DISEASE, UNSPECIFIED WHETHER ESOPHAGITIS PRESENT: ICD-10-CM

## 2021-04-07 DIAGNOSIS — Z01.411 ENCOUNTER FOR GYNECOLOGICAL EXAMINATION WITH ABNORMAL FINDING: Primary | ICD-10-CM

## 2021-04-07 DIAGNOSIS — R00.2 PALPITATIONS: ICD-10-CM

## 2021-04-07 DIAGNOSIS — R94.39 ABNORMAL STRESS ECG: Primary | ICD-10-CM

## 2021-04-07 DIAGNOSIS — Z12.4 SCREENING FOR CERVICAL CANCER: ICD-10-CM

## 2021-04-07 DIAGNOSIS — E78.5 HYPERLIPIDEMIA, UNSPECIFIED HYPERLIPIDEMIA TYPE: ICD-10-CM

## 2021-04-07 PROBLEM — Z76.89 ENCOUNTER TO ESTABLISH CARE: Status: RESOLVED | Noted: 2021-02-17 | Resolved: 2021-04-07

## 2021-04-07 PROBLEM — Z20.822 EXPOSURE TO COVID-19 VIRUS: Status: RESOLVED | Noted: 2021-01-05 | Resolved: 2021-04-07

## 2021-04-07 PROCEDURE — 99214 OFFICE O/P EST MOD 30 MIN: CPT | Performed by: INTERNAL MEDICINE

## 2021-04-07 PROCEDURE — G0145 SCR C/V CYTO,THINLAYER,RESCR: HCPCS | Performed by: PHYSICIAN ASSISTANT

## 2021-04-07 PROCEDURE — 87624 HPV HI-RISK TYP POOLED RSLT: CPT | Performed by: PHYSICIAN ASSISTANT

## 2021-04-07 PROCEDURE — 99386 PREV VISIT NEW AGE 40-64: CPT | Performed by: PHYSICIAN ASSISTANT

## 2021-04-07 RX ORDER — ATORVASTATIN CALCIUM 40 MG/1
20 TABLET, FILM COATED ORAL DAILY
Qty: 90 TABLET | Refills: 1 | Status: SHIPPED | OUTPATIENT
Start: 2021-04-07 | End: 2022-01-12 | Stop reason: ALTCHOICE

## 2021-04-07 NOTE — PROGRESS NOTES
Clinic Visit Note  Collins Lesch 45 y o  female   MRN: [id number]    Assessment and Plan      Abnormal stress ECG  Patient following Cardiology, will be going for cardiac catheterization on Friday, denies shortness of breath or chest pain, history of syncope and collapse  EKG findings on stress test did show inferior and lateral lead changes consistent with ischemia    Gastroesophageal reflux disease  Stable, continue PPI therapy    Palpitations  No active palpitations, will be going for cardiac catheterization on Friday    Hyperlipidemia, unspecified hyperlipidemia type  LDL elevated, total elevated, recommend high-intensity statin therapy in the setting of abnormal stress test with cardiac catheterization pending    Breast mass  Ultrasound 04/01/2021 probable left breast inter mamillary lymph node, recommending six-month follow-up mammogram and ultrasound    My impressions and treatment recommendations were discussed in detail with the patient who verbalized understanding and had no further questions  Discharge instructions were provided  Subjective     Chief Complaint:  Follow-up labs/imaging    History of Present Illness:    Patient is a 71-year-old female presenting today for follow-up on laboratory values and imaging  Patient did have a stress test that was positive, following Cardiology, will be going for cardiac catheterization on Friday to determine if patient does have CAD  She does have a history of syncope and collapse  All laboratory value was reviewed, recommending high-intensity statin therapy in the setting of elevated cholesterol and LDL  Patient also did have mammogram and ultrasound breast, left breast intramammary lymph node appreciated, recommending 6 month follow-up  Patient denies review of systems as stated below, feeling well today      The following portions of the patient's history were reviewed and updated as appropriate: allergies, current medications, past family history, past medical history, past social history, past surgical history and problem list     REVIEW OF SYSTEMS:  A complete 12-point review of systems is negative other than that noted in the HPI  Review of Systems   Constitutional: Negative for chills, fatigue and fever  HENT: Negative for congestion and sore throat  Eyes: Negative for photophobia  Respiratory: Negative for cough and wheezing  Cardiovascular: Negative for chest pain, palpitations and leg swelling  Gastrointestinal: Negative for abdominal distention, constipation, diarrhea, nausea and vomiting  Genitourinary: Negative for difficulty urinating and dysuria  Musculoskeletal: Negative for back pain and neck pain  Neurological: Negative for dizziness, light-headedness and headaches  Psychiatric/Behavioral: Negative for agitation, behavioral problems and confusion           Current Outpatient Medications:     Ascorbic Acid (VITAMIN C PO), Take 1 tablet by mouth daily, Disp: , Rfl:     Biotin 5 MG/ML LIQD, Take 1 mL by mouth daily , Disp: , Rfl:     multivitamin (THERAGRAN) TABS, Take 1 tablet by mouth daily, Disp: , Rfl:     omeprazole (PriLOSEC) 20 mg delayed release capsule, Take 1 capsule (20 mg total) by mouth daily, Disp: 60 capsule, Rfl: 0  Past Medical History:   Diagnosis Date    Anemia     Sarcoidosis      Past Surgical History:   Procedure Laterality Date    ANKLE SURGERY Right 2018    KNEE ARTHROSCOPY W/ MENISCAL REPAIR Left 2013    TUBAL LIGATION  1997     Social History     Socioeconomic History    Marital status: Registered Domestic Partner     Spouse name: Not on file    Number of children: Not on file    Years of education: Not on file    Highest education level: Not on file   Occupational History    Not on file   Social Needs    Financial resource strain: Not on file    Food insecurity     Worry: Not on file     Inability: Not on file    Transportation needs     Medical: Not on file     Non-medical: Not on file   Tobacco Use    Smoking status: Former Smoker     Packs/day: 0 25     Years: 20 00     Pack years: 5 00     Types: Cigarettes     Quit date: 2016     Years since quittin 2    Smokeless tobacco: Never Used   Substance and Sexual Activity    Alcohol use: Yes     Comment: socially    Drug use: Never    Sexual activity: Not Currently   Lifestyle    Physical activity     Days per week: Not on file     Minutes per session: Not on file    Stress: Not on file   Relationships    Social connections     Talks on phone: Not on file     Gets together: Not on file     Attends Tenriism service: Not on file     Active member of club or organization: Not on file     Attends meetings of clubs or organizations: Not on file     Relationship status: Not on file    Intimate partner violence     Fear of current or ex partner: Not on file     Emotionally abused: Not on file     Physically abused: Not on file     Forced sexual activity: Not on file   Other Topics Concern    Not on file   Social History Narrative    Not on file     Family History   Problem Relation Age of Onset    Dementia Mother     Seizures Mother     Hypertension Mother    [facility] Osteoporosis Mother     Glaucoma Mother     Macular degeneration Mother     Hearing loss Mother     Hyperlipidemia Mother     No Known Problems Sister     No Known Problems Daughter     No Known Problems Maternal Grandmother     No Known Problems Maternal Grandfather     No Known Problems Daughter     No Known Problems Maternal Aunt      Allergies   Allergen Reactions    Penicillin G Hives       Objective     Vitals:    21 1537   BP: 132/60   BP Location: Left arm   Patient Position: Sitting   Cuff Size: Adult   Pulse: (!) 52   Temp: 98 3 °F (36 8 °C)   TempSrc: Temporal   SpO2: 98%   Weight: 86 2 kg (190 lb)   Height: 5' 4" (1 626 m)       Physical exam:     GENERAL: NAD, pleasant   HEENT:  NC/AT, PERRL, EOMI, no scleral icterus  CARDIAC:  RRR, +S1/S2, no S3/S4 heard, no m/g/r  PULMONARY:  CTA B/L, no wheezing/rales/rhonci, non-labored breathing  ABDOMEN:  Soft, NT/ND, no rebound/guarding/rigidity  Extremities:  No edema, cyanosis, or clubbing  NEUROLOGIC: Grossly intact, No focal deficits  SKIN:  No rashes or erythema noted on exposed skin  Psych: Normal affect    ==  PLEASE NOTE:  This encounter was completed utilizing the [employer] Speech Voice Recognition Software  Grammatical errors, random word insertions, pronoun errors and incomplete sentences are occasional consequences of the system due to software limitations, ambient noise and hardware issues  These may be missed by proof reading prior to affixing electronic signature  Any questions or concerns about the content, text or information contained within the body of this dictation should be directly addressed to the physician for clarification  Please do not hesitate to call me directly if you have any any questions or concerns      DO Marcellus Velasquez 72 Internal Medicine PGY-2

## 2021-04-07 NOTE — PATIENT INSTRUCTIONS
F/u for breast imaging as planned  Go for pelvic U/S  Try black cohosh for hot flashes  Can use coconut oil or silicone based lubricant for dryness  Consider consult with nutrition for weight gain

## 2021-04-07 NOTE — PROGRESS NOTES
Assessment/Plan:    No problem-specific Assessment & Plan notes found for this encounter  Diagnoses and all orders for this visit:    Encounter for gynecological examination with abnormal finding  -     Liquid-based pap, screening    Abnormal uterine bleeding (AUB)  -     US pelvis complete w transvaginal; Future    Screening for cervical cancer  -     Ambulatory referral to Gynecology    Other orders  -     Cancel: Mammo screening bilateral w 3d & cad; Future        Pap done  Order for f/u breast imaging already in Epic  Discussed menopausal symptoms with patient  Order for pelvic U/S entered to evaluate endometrium  Try black cohosh for hot flashes  Use coconut oil or silicone based lubricant for dryness; can treat further once menses stop  Recommended consult with nutritionist to help with weight gain  If no problems, patient to return in 1 year for routine gyn care  Subjective:      Patient ID: Sandy Hooper is a 45 y o  female  Patient is here for yearly gyn exam   She is new to our office today  It has been two years since her last gyn visit  Periods are irregular - occur every 3-4 months  Bleeding can last up to two weeks and is heavy  She also complains of weight gain, hot flashes, lack of libido, and vaginal dryness  Patient denies bowel/bladder changes, pelvic pain, bloating, abdominal pain, n/v, change in appetite, and thyroid disease  She is due for colonoscopy  No history of abnormal Paps or HPV  Mammogram in March showed new mass in L breast; f/u U/S showed probable lymph node  Patient has f/u imaging scheduled for October  She is not performing self-breast exam   Denies new masses, skin changes, nipple discharge, and pain/tenderness  Family cancer history negative        The following portions of the patient's history were reviewed and updated as appropriate: allergies, current medications, past family history, past medical history, past social history, past surgical history and problem list     Review of Systems   Constitutional: Positive for diaphoresis (Hot flashes) and unexpected weight change  Negative for appetite change  Cardiovascular:        No masses, skin changes, nipple discharge, and pain/tenderness  Gastrointestinal: Negative for abdominal distention, abdominal pain, constipation, diarrhea, nausea and vomiting  Genitourinary: Positive for dyspareunia and menstrual problem (AUB)  Negative for difficulty urinating, dysuria, frequency, genital sores, hematuria, pelvic pain, urgency, vaginal bleeding, vaginal discharge and vaginal pain  Psychiatric/Behavioral:        Lack of libido           Objective:      /76   Ht 5' 4" (1 626 m)   Wt 86 2 kg (190 lb)   LMP 03/18/2021 (Approximate)   BMI 32 61 kg/m²          Physical Exam  Vitals signs reviewed  Exam conducted with a chaperone present  Constitutional:       Appearance: Normal appearance  She is well-developed  Neck:      Musculoskeletal: Neck supple  Thyroid: No thyromegaly  Pulmonary:      Effort: Pulmonary effort is normal    Chest:      Breasts: Breasts are symmetrical          Right: Normal  No swelling, bleeding, inverted nipple, mass, nipple discharge, skin change or tenderness  Left: Normal  No swelling, bleeding, inverted nipple, mass, nipple discharge, skin change or tenderness  Abdominal:      General: Abdomen is flat  There is no distension  Palpations: Abdomen is soft  Tenderness: There is no abdominal tenderness  Genitourinary:     General: Normal vulva  Pubic Area: No rash  Labia:         Right: No rash, tenderness, lesion or injury  Left: No rash, tenderness, lesion or injury  Vagina: Normal  No vaginal discharge, erythema, tenderness or bleeding  Cervix: Normal       Uterus: Normal        Adnexa: Right adnexa normal and left adnexa normal         Right: No mass, tenderness or fullness            Left: No mass, tenderness or fullness  Lymphadenopathy:      Cervical: No cervical adenopathy  Upper Body:      Right upper body: No supraclavicular or axillary adenopathy  Left upper body: No supraclavicular or axillary adenopathy  Lower Body: No right inguinal adenopathy  No left inguinal adenopathy  Skin:     General: Skin is warm and dry  Neurological:      Mental Status: She is alert and oriented to person, place, and time  Psychiatric:         Mood and Affect: Mood normal          Behavior: Behavior normal  Behavior is cooperative  Thought Content:  Thought content normal          Judgment: Judgment normal

## 2021-04-08 ENCOUNTER — TELEPHONE (OUTPATIENT)
Dept: SURGERY | Facility: HOSPITAL | Age: 51
End: 2021-04-08

## 2021-04-08 RX ORDER — CLOPIDOGREL BISULFATE 75 MG/1
600 TABLET ORAL ONCE
Status: CANCELLED | OUTPATIENT
Start: 2021-04-08 | End: 2021-04-08

## 2021-04-08 RX ORDER — ASPIRIN 81 MG/1
324 TABLET, CHEWABLE ORAL ONCE
Status: CANCELLED | OUTPATIENT
Start: 2021-04-08 | End: 2021-04-08

## 2021-04-08 RX ORDER — SODIUM CHLORIDE 9 MG/ML
125 INJECTION, SOLUTION INTRAVENOUS CONTINUOUS
Status: CANCELLED | OUTPATIENT
Start: 2021-04-08

## 2021-04-09 ENCOUNTER — HOSPITAL ENCOUNTER (OUTPATIENT)
Dept: NON INVASIVE DIAGNOSTICS | Facility: HOSPITAL | Age: 51
Discharge: HOME | End: 2021-04-09
Attending: INTERNAL MEDICINE | Admitting: INTERNAL MEDICINE
Payer: COMMERCIAL

## 2021-04-09 VITALS
DIASTOLIC BLOOD PRESSURE: 62 MMHG | RESPIRATION RATE: 16 BRPM | WEIGHT: 190 LBS | BODY MASS INDEX: 33.66 KG/M2 | HEART RATE: 51 BPM | HEIGHT: 63 IN | SYSTOLIC BLOOD PRESSURE: 135 MMHG | OXYGEN SATURATION: 97 %

## 2021-04-09 DIAGNOSIS — R00.2 PALPITATIONS: ICD-10-CM

## 2021-04-09 DIAGNOSIS — R94.39 ABNORMAL STRESS ECG: ICD-10-CM

## 2021-04-09 DIAGNOSIS — R07.89 OTHER CHEST PAIN: ICD-10-CM

## 2021-04-09 LAB
ANION GAP SERPL CALCULATED.3IONS-SCNC: 8 MMOL/L (ref 4–13)
ATRIAL RATE: 50 BPM
BUN SERPL-MCNC: 14 MG/DL (ref 5–25)
CALCIUM SERPL-MCNC: 8.6 MG/DL (ref 8.3–10.1)
CHLORIDE SERPL-SCNC: 111 MMOL/L (ref 100–108)
CO2 SERPL-SCNC: 23 MMOL/L (ref 21–32)
CREAT SERPL-MCNC: 0.82 MG/DL (ref 0.6–1.3)
GFR SERPL CREATININE-BSD FRML MDRD: 96 ML/MIN/1.73SQ M
GLUCOSE P FAST SERPL-MCNC: 91 MG/DL (ref 65–99)
GLUCOSE SERPL-MCNC: 91 MG/DL (ref 65–140)
POTASSIUM SERPL-SCNC: 4.5 MMOL/L (ref 3.5–5.3)
PR INTERVAL: 154 MS
QRS AXIS: 14 DEGREES
QRSD INTERVAL: 88 MS
QT INTERVAL: 458 MS
QTC INTERVAL: 417 MS
SODIUM SERPL-SCNC: 142 MMOL/L (ref 136–145)
T WAVE AXIS: 0 DEGREES
VENTRICULAR RATE: 50 BPM

## 2021-04-09 PROCEDURE — 99152 MOD SED SAME PHYS/QHP 5/>YRS: CPT | Performed by: INTERNAL MEDICINE

## 2021-04-09 PROCEDURE — 93010 ELECTROCARDIOGRAM REPORT: CPT | Performed by: INTERNAL MEDICINE

## 2021-04-09 PROCEDURE — 93454 CORONARY ARTERY ANGIO S&I: CPT | Performed by: INTERNAL MEDICINE

## 2021-04-09 PROCEDURE — 99153 MOD SED SAME PHYS/QHP EA: CPT | Performed by: INTERNAL MEDICINE

## 2021-04-09 PROCEDURE — C1769 GUIDE WIRE: HCPCS | Performed by: INTERNAL MEDICINE

## 2021-04-09 PROCEDURE — 80048 BASIC METABOLIC PNL TOTAL CA: CPT | Performed by: INTERNAL MEDICINE

## 2021-04-09 PROCEDURE — C1894 INTRO/SHEATH, NON-LASER: HCPCS | Performed by: INTERNAL MEDICINE

## 2021-04-09 PROCEDURE — 93005 ELECTROCARDIOGRAM TRACING: CPT

## 2021-04-09 RX ORDER — SODIUM CHLORIDE 9 MG/ML
150 INJECTION, SOLUTION INTRAVENOUS CONTINUOUS
Status: DISPENSED | OUTPATIENT
Start: 2021-04-09 | End: 2021-04-09

## 2021-04-09 RX ORDER — ACETAMINOPHEN 325 MG/1
650 TABLET ORAL EVERY 4 HOURS PRN
Status: DISCONTINUED | OUTPATIENT
Start: 2021-04-09 | End: 2021-04-09 | Stop reason: HOSPADM

## 2021-04-09 RX ORDER — MIDAZOLAM HYDROCHLORIDE 2 MG/2ML
INJECTION, SOLUTION INTRAMUSCULAR; INTRAVENOUS CODE/TRAUMA/SEDATION MEDICATION
Status: COMPLETED | OUTPATIENT
Start: 2021-04-09 | End: 2021-04-09

## 2021-04-09 RX ORDER — FENTANYL CITRATE 50 UG/ML
INJECTION, SOLUTION INTRAMUSCULAR; INTRAVENOUS CODE/TRAUMA/SEDATION MEDICATION
Status: COMPLETED | OUTPATIENT
Start: 2021-04-09 | End: 2021-04-09

## 2021-04-09 RX ORDER — ASPIRIN 81 MG/1
324 TABLET, CHEWABLE ORAL ONCE
Status: COMPLETED | OUTPATIENT
Start: 2021-04-09 | End: 2021-04-09

## 2021-04-09 RX ORDER — VERAPAMIL HCL 2.5 MG/ML
AMPUL (ML) INTRAVENOUS CODE/TRAUMA/SEDATION MEDICATION
Status: COMPLETED | OUTPATIENT
Start: 2021-04-09 | End: 2021-04-09

## 2021-04-09 RX ORDER — LIDOCAINE HYDROCHLORIDE 10 MG/ML
INJECTION, SOLUTION EPIDURAL; INFILTRATION; INTRACAUDAL; PERINEURAL CODE/TRAUMA/SEDATION MEDICATION
Status: COMPLETED | OUTPATIENT
Start: 2021-04-09 | End: 2021-04-09

## 2021-04-09 RX ORDER — SODIUM CHLORIDE 9 MG/ML
125 INJECTION, SOLUTION INTRAVENOUS CONTINUOUS
Status: DISCONTINUED | OUTPATIENT
Start: 2021-04-09 | End: 2021-04-09

## 2021-04-09 RX ORDER — HEPARIN SODIUM 1000 [USP'U]/ML
INJECTION, SOLUTION INTRAVENOUS; SUBCUTANEOUS CODE/TRAUMA/SEDATION MEDICATION
Status: COMPLETED | OUTPATIENT
Start: 2021-04-09 | End: 2021-04-09

## 2021-04-09 RX ORDER — NITROGLYCERIN 20 MG/100ML
INJECTION INTRAVENOUS CODE/TRAUMA/SEDATION MEDICATION
Status: COMPLETED | OUTPATIENT
Start: 2021-04-09 | End: 2021-04-09

## 2021-04-09 RX ORDER — CLOPIDOGREL BISULFATE 75 MG/1
600 TABLET ORAL ONCE
Status: COMPLETED | OUTPATIENT
Start: 2021-04-09 | End: 2021-04-09

## 2021-04-09 RX ORDER — ONDANSETRON 2 MG/ML
4 INJECTION INTRAMUSCULAR; INTRAVENOUS EVERY 6 HOURS PRN
Status: DISCONTINUED | OUTPATIENT
Start: 2021-04-09 | End: 2021-04-09 | Stop reason: HOSPADM

## 2021-04-09 RX ADMIN — ASPIRIN 324 MG: 81 TABLET, CHEWABLE ORAL at 08:30

## 2021-04-09 RX ADMIN — SODIUM CHLORIDE 125 ML/HR: 0.9 INJECTION, SOLUTION INTRAVENOUS at 08:31

## 2021-04-09 RX ADMIN — MIDAZOLAM 2 MG: 1 INJECTION INTRAMUSCULAR; INTRAVENOUS at 10:05

## 2021-04-09 RX ADMIN — HEPARIN SODIUM 4000 UNITS: 1000 INJECTION INTRAVENOUS; SUBCUTANEOUS at 10:11

## 2021-04-09 RX ADMIN — Medication 200 MCG: at 10:11

## 2021-04-09 RX ADMIN — VERAPAMIL HYDROCHLORIDE 1.25 MG: 2.5 INJECTION, SOLUTION INTRAVENOUS at 10:11

## 2021-04-09 RX ADMIN — LIDOCAINE HYDROCHLORIDE 0.1 ML: 10 INJECTION, SOLUTION EPIDURAL; INFILTRATION; INTRACAUDAL; PERINEURAL at 10:08

## 2021-04-09 RX ADMIN — IOHEXOL 40 ML: 350 INJECTION, SOLUTION INTRAVENOUS at 10:25

## 2021-04-09 RX ADMIN — FENTANYL CITRATE 50 MCG: 50 INJECTION INTRAMUSCULAR; INTRAVENOUS at 10:05

## 2021-04-09 RX ADMIN — CLOPIDOGREL BISULFATE 600 MG: 75 TABLET ORAL at 08:30

## 2021-04-09 NOTE — INTERVAL H&P NOTE
H&P reviewed  After examining the patient, I find no changed to the H&P since it had been written  LMP 03/18/2021 (Approximate)     Patient re-evaluated   Accept as history and physical     Sarah Yadav MD

## 2021-04-09 NOTE — DISCHARGE INSTRUCTIONS
1  Please see the post cardiac catheterization dishcarge instructions  No heavy lifting, greater than 10 lbs  or strenuous  activity for 48 hrs  2 Remove band aid tomorrow  Shower and wash area- wrist gently with soap and water- beginning tomorrow  Rinse and pat dry  Apply new water seal band aid  Repeat this process for 5 days  No powders, creams lotions or antibiotic ointments  for 5 days  No tub baths, hot tubs or swimming for 5 days  3  Please call our office ([phone]) if you have any fever, redness, swelling, discharge from your wrist access site  4 No driving for 1 day    ================================================================================================================================================================    Left Heart Catheterization   WHAT YOU NEED TO KNOW:   A left heart catheterization is a procedure to look at your heart and its arteries  You may need this procedure if you have chest pain, heart disease, or your heart is not working as it should  DISCHARGE INSTRUCTIONS:   Call 911 for any of the following:   · You have any of the following signs of a heart attack:      ? Squeezing, pressure, or pain in your chest     ? and  any of the following:     ? Discomfort or pain in your back, neck, jaw, stomach, or arm     ? Shortness of breath    ? Nausea or vomiting    ? Lightheadedness or a sudden cold sweat    · You have any of the following signs of a stroke:      ? Numbness or drooping on one side of your face     ? Weakness in an arm or leg    ? Confusion or difficulty speaking    ? Dizziness, a severe headache, or vision loss    Seek care immediately if:   · Your arm or leg feels warm, tender, and painful  It may look swollen and red  · The leg or arm used for your angiogram is numb, painful, or changes color  · The bruise at your catheter site gets bigger or becomes swollen       · Your wound does not stop bleeding even after you apply firm pressure for 15 minutes  · You have weakness in an arm or leg  · You become confused or have difficulty speaking  · You have dizziness, a severe headache, or vision loss  Contact your healthcare provider if:   · You have a fever  · Your catheter site is red, leaks pus, or smells bad  · You have increasing pain at your catheter site  · You have questions or concerns about your condition or care  Limit activity as directed:   · Avoid unnecessary stair climbing for 48 hours, if a catheter was put in your groin  · Do not place pressure on your arm, hand, or wrist, if the catheter was placed in your wrist  Avoid pushing, pulling, or heavy lifting with that arm  · If you need to cough, support the area where the catheter was inserted with your hand  · Ask your healthcare provider how long you need to limit movement and avoid certain activities  · You may feel like resting more after your procedure  Slowly start to do more each day  Rest when you feel it is needed  Keep your bandage clean and dry:  Ask your healthcare provider when you can bathe and shower  Do not take baths or go in pools or hot tubs  Check your site every day for signs of infection such as swelling, redness, or pus  Drink liquids as directed:  Liquids help flush the dye used for your procedure out of your body  Ask your healthcare provider how much liquid to drink each day, and which liquids to drink  Some foods, such as soup and fruit, also provide liquid  Limit alcohol:  Do not drink alcohol for 24 hours after your procedure  Then limit alcohol  Women should limit alcohol to 1 drink a day  Men should limit alcohol to 2 drinks a day  A drink of alcohol is 12 ounces of beer, 5 ounces of wine, or 1½ ounces of liquor  Do not smoke:  Nicotine and other chemicals in cigarettes and cigars can damage your blood vessels  Ask your healthcare provider for information if you currently smoke and need help to quit   E-cigarettes or smokeless tobacco still contain nicotine  Talk to your healthcare provider before you use these products  Follow up with your healthcare provider as directed:  Write down your questions so you remember to ask them during your visits  © Copyright [employer] 2018 Information is for End User's use only and may not be sold, redistributed or otherwise used for commercial purposes  All illustrations and images included in CareNotes® are the copyrighted property of A D A M , Inc  or Roby Masters  The above information is an  only  It is not intended as medical advice for individual conditions or treatments  Talk to your doctor, nurse or pharmacist before following any medical regimen to see if it is safe and effective for you

## 2021-04-10 LAB
HPV HR 12 DNA CVX QL NAA+PROBE: NEGATIVE
HPV16 DNA CVX QL NAA+PROBE: NEGATIVE
HPV18 DNA CVX QL NAA+PROBE: NEGATIVE

## 2021-04-12 DIAGNOSIS — R00.2 PALPITATIONS: Primary | ICD-10-CM

## 2021-04-12 DIAGNOSIS — R55 SYNCOPE AND COLLAPSE: ICD-10-CM

## 2021-04-14 LAB
LAB AP GYN PRIMARY INTERPRETATION: NORMAL
Lab: NORMAL

## 2021-04-15 ENCOUNTER — ULTRASOUND (OUTPATIENT)
Dept: OBGYN CLINIC | Facility: CLINIC | Age: 51
End: 2021-04-15
Payer: COMMERCIAL

## 2021-04-15 DIAGNOSIS — D21.9 FIBROID: Primary | ICD-10-CM

## 2021-04-15 DIAGNOSIS — N93.9 ABNORMAL UTERINE BLEEDING (AUB): ICD-10-CM

## 2021-04-15 PROCEDURE — 76856 US EXAM PELVIC COMPLETE: CPT | Performed by: OBSTETRICS & GYNECOLOGY

## 2021-04-18 NOTE — PROGRESS NOTES
AMB US Pelvic Non OB    Date/Time: 4/15/2021 1:03 PM  Performed by: Ceferino Carvajal  Authorized by: Trisha Lynn MD     Procedure details:     Indications: ovarian cysts, non-obstetric abdominal pain and non-obstetric vaginal bleeding      Indications comment:  AUB    Technique:  US Pelvic, Non-OB with complete exam  Uterine findings:     Length (cm): 8 6    Height (cm):  5 2    Width (cm):  7 8    Uterine adhesions: not identified      Adnexal mass: identified      Location:  Mass right    Polyps: not identified      Myomas: identified      Endometrial stripe: identified      Endometrial hyperplasia: not identified      Endometrium thickness (mm):  18  Left ovary findings:     Left ovary:  Visualized    Cysts: not identified      Length (cm): 3 2    Height (cm): 2 7    Width (cm): 3  Right ovary findings:     Right ovary:  Visualized    Cysts: identified      Length (cm): 4 6    Height (cm): 3 2    Width (cm): 3 5    Flow:  Absent  Other findings:     Free pelvic fluid: not identified      Free peritoneal fluid: not identified    Post-Procedure Details:     Impression:  Endo=18mm, RT Complex septated cystic ov=44 x 32 x 34 mm, with no doppler flow within    Tolerance:   Tolerated well, no immediate complications      [id number]=13 x 36 x 31 mm,& 26 x 25 x 28 mm
Pelvic ultrasound showed endometrial stripe of 18 mm  Probable EB X        Also showed a right complex ovarian cyst   Needs follow-up scan in 4-6 weeks     Thanks
Patient

## 2021-04-19 ENCOUNTER — TELEPHONE (OUTPATIENT)
Dept: CARDIOLOGY CLINIC | Facility: CLINIC | Age: 51
End: 2021-04-19

## 2021-04-19 NOTE — TELEPHONE ENCOUNTER
----- Message from Aayush Laws sent at 4/19/2021  9:31 AM EDT -----  She does not need auth for her Zio Patch per Delores Mclain at [facility]  Ref# [id number]  ----- Message -----  From: Valentin Valencia MA  Sent: 4/19/2021   8:40 AM EDT  To: Aayush Laws    Emreviktor Jaimes  Any Updates on pt prior Auth for Zio Monitor   Thank you

## 2021-04-21 ENCOUNTER — TELEPHONE (OUTPATIENT)
Dept: OBGYN CLINIC | Facility: CLINIC | Age: 51
End: 2021-04-21

## 2021-04-21 NOTE — TELEPHONE ENCOUNTER
Spoke with patient regarding pelvic U/S results - endometrium measured 18 mm  Small complex cyst on R ovary  Patient needs endometrial biopsy and f/u pelvic U/S in 4-6 weeks  She will schedule both appointments today

## 2021-04-27 ENCOUNTER — OFFICE VISIT (OUTPATIENT)
Dept: PSYCHIATRY | Facility: CLINIC | Age: 51
End: 2021-04-27
Payer: COMMERCIAL

## 2021-04-27 DIAGNOSIS — F32.A DEPRESSION, UNSPECIFIED DEPRESSION TYPE: ICD-10-CM

## 2021-04-27 DIAGNOSIS — F41.9 ANXIETY DISORDER, UNSPECIFIED TYPE: Primary | ICD-10-CM

## 2021-04-27 PROCEDURE — 90792 PSYCH DIAG EVAL W/MED SRVCS: CPT | Performed by: PSYCHIATRY & NEUROLOGY

## 2021-04-27 NOTE — PSYCH
54 [address]    Name and Date of Birth:  William Pickard 45 y o  1970 MRN: [id number]    Date of Visit: April 27, 2021    Source of Information:Patient herself who seems to be good historian  Her medical records in the [facility] was also reviewed  Chief Complaint:   "mood swings, palpitation and crying suddenly at times"    HPI:This is a 23-year-old female, living with her boyfriend for last 32 year, has 3 grownup children, currently lives with her boyfriend and her youngest daughter in [place] [address]  The patient's mother also lives with them  The patient works as a PCA  The patient denies any history of any mental health diagnosis or any treatment ever in the past   No psychiatric inpatient admission or ever seen therapist or psychiatrist in the past   The patient was referred by her primary care physician to us for psychiatric evaluation and management  The patient reports she had been  Feeling overwhelmed for almost more than a year  The patient reports she has been taking care of her mother who has dementia  She also reports she herself has multiple medical issues diagnosed all within last 1 year and it had been very stressful  The patient has been struggling with bradycardia and history of syncope and currently undergoing workup for it  The patient recently went for angiography and as per the patient there was no obstruction found  She currently is on Holter monitoring for 7 days  The patient also was recently found to have breast lump on examination but as per her primary care physician notes it seems probably lymph node  Has mammogram in 6 months  The patient also was found to have cyst in the ovary and will undergo biopsy this Friday  The patient also reports her 2 children has mental health issues including her son struggling with bipolar disorder and it can be stressful at times    Her  also retired recently and is at home all the time and patient reports sometime it can get stressful  She reports she has been struggling with episodes where she will suddenly start crying without any trigger along with having palpitations  The patient reports it has happened 3-4 times since last June  Reports though no other associated symptoms along with it  Denies any hyperventilation, dizziness, shakiness, tunnel vision or any significant increase in anxiety  The patient though reports she worries all the time specially worsened over last couple of months  She reports her mind is always racing and she is always apprehensive about what next coming  She reports even if things are going well she will still worry about something or other  She though denies any social anxiety but at times feels uncomfortable in social situations  The patient denies any history of any trauma or any abuse  She denies any history of any symptoms or obsessive-compulsive disorder  The patient also reports she struggles with mood swings  She reports her mood can change from being happy to angry very easily  She reports if she does not like something she hears she was snap at people  Reports her speech will be loud in sometime getting into verbal altercation  She though reports it does not last for too long and she is able to walk away from the situation  She denies any physical violence or any behaviors such as throwing stuff or breaking things  Denies any punching walls or any self-harming behavior  She reports this has been present most of her life  She denies any symptoms of smitha or hypomania  Denies any episodes when explained about manic or hypomanic in detail  The patient reports she feels depressed nowadays  Reports has been going on for 2 months  She reports she does not feel motivated    She reports her energy level is low, concentration has not been good, poor sleep specially waking up few times at night and can be awake up to 2 hours continuously  She denies any suicidal thoughts or any history of suicide attempt  Denies any urges to hurt other  Reports not able to enjoy anything nowadays  The patient denies any previous history of major depressive episode  Reports there will be a day or 2 when she might feel low due to some stressor but denies any episode like this  She denies any history of auditory or visual hallucination  Does not endorse any paranoia or delusional ideation  Denies any history of any eating disorder  Review Of Systems:    Constitutional negative   ENT negative   Cardiovascular bradycardia   Respiratory negative   Gastrointestinal negative   Genitourinary negative   Musculoskeletal negative   Integumentary negative   Neurological negative   Endocrine negative   Other Symptoms none       Past Psychiatric History:  Denies  Never been on any psychiatric medication ever in the past   Denies any suicide attempt or any physical violence  Traumatic History:     Abuse: none  Other Traumatic Events: none       Substance Abuse History:  Reports drinking alcohol socially around 10 times in a year  Denies any history of alcohol abuse or substance use            Longest clean time: not applicable  History of Inpatient/Outpatient rehabilitation program: no  Smoking history: denies current use  Use of caffeine: 1 cup of coffee per day    Family Psychiatric/Substance Use History:     Psychiatric Illness:  Son - bipolar disorder, Aunt - completed suicide, Uncle - completed suicide, Uncle - completed suicide  Substance Abuse:  patient denies  Suicide Attempts:  Check above    Social History:  Born & Raised in :  [place]  Childhood Experiences: good  Education: high school graduate  Learning Disabilities: none  Marital History: common law marriage  Children: 3 adult children  Living Arrangement: lives in home with common-law , daughter and mother  Occupational History: works As [employer]  Functioning Relationships: good support system  Legal History: none   History: None      Past Medical History:    Past Medical History:   Diagnosis Date    Anemia     Sarcoidosis      No past medical history pertinent negatives    Past Surgical History:   Procedure Laterality Date    ANKLE SURGERY Right 2018    KNEE ARTHROSCOPY W/ MENISCAL REPAIR Left 2013    TUBAL LIGATION  1997     Allergies   Allergen Reactions    Penicillin G Hives         Current Medications:      Current Outpatient Medications:     Ascorbic Acid (VITAMIN C PO), Take 1 tablet by mouth daily, Disp: , Rfl:     atorvastatin (LIPITOR) 40 mg tablet, Take 0 5 tablets (20 mg total) by mouth daily, Disp: 90 tablet, Rfl: 1    Biotin 5 MG/ML LIQD, Take 1 mL by mouth daily , Disp: , Rfl:     multivitamin (THERAGRAN) TABS, Take 1 tablet by mouth daily, Disp: , Rfl:     omeprazole (PriLOSEC) 20 mg delayed release capsule, Take 1 capsule (20 mg total) by mouth daily, Disp: 60 capsule, Rfl: 0       OBJECTIVE:    Vital signs in last 24 hours:    Hr 52/min, /76 mmhg    Mental Status Evaluation:    Appearance age appropriate, casually dressed   Behavior cooperative, calm   Speech normal rate, normal volume, normal pitch   Mood depressed, anxious   Affect normal range and intensity, appropriate   Thought Processes organized, goal directed   Associations intact associations   Thought Content no overt delusions   Perceptual Disturbances: no auditory hallucinations, no visual hallucinations   Abnormal Thoughts  Risk Potential Suicidal ideation - None  Homicidal ideation - None  Potential for aggression - No   Orientation oriented to person, place, time/date and situation   Memory recent and remote memory grossly intact   Consciousness alert and awake   Attention Span Concentration Span attention span and concentration are age appropriate   Intellect appears to be of average intelligence   Insight intact   Judgement intact Muscle Strength and  Gait normal gait and normal balance   Motor Activity no abnormal movements   Language no difficulty naming common objects, no difficulty repeating a phrase, no difficulty writing a sentence   Fund of Knowledge adequate knowledge of current events  adequate fund of knowledge regarding past history  adequate fund of knowledge regarding vocabulary    Pain none   Pain Scale 0       Laboratory Results:   Most Recent Labs:   Lab Results   Component Value Date    WBC 5 51 04/05/2021    RBC 4 99 04/05/2021    HGB 11 9 04/05/2021    HCT 41 3 04/05/2021     04/05/2021    RDW 18 8 (H) 04/05/2021    K 4 5 04/09/2021     (H) 04/09/2021    CO2 23 04/09/2021    BUN 14 04/09/2021    CREATININE 0 82 04/09/2021    CALCIUM 8 6 04/09/2021    AST 19 04/05/2021    ALT 20 04/05/2021    ALKPHOS 103 04/05/2021    HDL 42 04/05/2021    TRIG 226 (H) 04/05/2021    LDLCALC 120 (H) 04/05/2021    [id number] 1 294 04/05/2021       Assessment/Plan:  From evaluation of the patient today and review of her past history, I at this time will tentatively diagnose the patient with depression unspecified and anxiety disorder unspecified  Though it seems patient probably will meet the criteria for major depressive disorder, single episode currently moderate in severity based on her current presentation  I at this time wish to rule out adjustment disorder with depressed mood before confirming the diagnosis for major depression  Given the patient reports struggling with multiple stressors over last 1 year  The patient probably also might meet the criteria for generalized anxiety disorder but needs to confirm over next few visits  The patient reports mood swings but it seems like the patient mostly struggle with getting angry easily which I at this time feels due to underlying stressors and depression rather than any other mood disorder  Does not seem to meet the criteria for bipolar disorder or any psychosis    Does not have history of alcohol abuse or any substance use disorder  Has a good support  future oriented  Does not endorse any suicidal ideation or any history of suicide attempt  Plan: The patient currently is struggling with multiple medical issues including getting workup for any cardiac issue  The patient heart rate checked in the office was 52 per minute  The patient denies any symptoms such as dizziness or  Chest discomfort  The patient currently is on Holter monitoring  Plan at this time is to hold starting the patient on any medication for depression or anxiety till her cardiac workup results comes out  If there is no underlying cardiac issue then I will consider starting the patient on   Antidepressant and antianxiety medication  The patient will be followed up in 4 weeks or sooner if needed  The patient was educated about the various pharmacological treatment options including medications and the patient agrees with waiting till her cardiac workup result comes  The patient though was advise that to call me if there is any concern or any worsening of symptoms or to call crisis or visit nearby ER in case of any emergency or having any SI or HI  The patient verbalized understanding and agrees with the plan  Diagnoses and all orders for this visit:    Anxiety disorder, unspecified type    Depression, unspecified depression type  -     Ambulatory referral to Psychiatry          Treatment Recommendations:    Check Assessment/Plan section for details  Risks/Benefits/Precautions:      Risks, Benefits And Possible Side Effects Of Medications:    Risks, benefits, and possible side effects of medications explained to Bhavna and she verbalizes understanding and agreement for treatment      Controlled Medication Discussion:     Not applicable    Treatment Plan;    Completed and signed during the session: Yes - Treatment Plan done but not signed at time of office visit due to:  Plan reviewed in person and verbal consent given due to COVID social distancing    Maria Alejandra Maya MD 04/27/21

## 2021-04-27 NOTE — BH TREATMENT PLAN
TREATMENT PLAN (Medication Management Only)        [facility]    Name and Date of Birth:  Bety Steele 45 y o  1970  Date of Treatment Plan: April 27, 2021  Diagnosis/Diagnoses:    1  Anxiety disorder, unspecified type    2  Depression, unspecified depression type      Strengths/Personal Resources for Self-Care: supportive family, ability to communicate needs, ability to communicate well, ability to listen, ability to understand psychiatric illness, average or above intelligence  Area/Areas of need (in own words): anxiety, depression, mood swings  1  Long Term Goal: Feel better about self  Target Date:4 weeks - 5/25/2021  Person/Persons responsible for completion of goal: Bhavna  2  Short Term Objective (s) - How will we reach this goal?:   A  Provider new recommended medication/dosage changes and/or continue medication(s): Currently not recommended any medication  Discuss about options for therapy  B  N/A   C  N/A  Target Date:4 weeks - 5/25/2021  Person/Persons Responsible for Completion of Goal: Bhavna  Progress Towards Goals: stable  Treatment Modality: Will follow-up in 4 weeks  No medication treatment planned at this visit  Review due 180 days from date of this plan: 4 months - 8/27/2021  Expected length of service: ongoing treatment  My Physician/PA/NP and I have developed this plan together and I agree to work on the goals and objectives  I understand the treatment goals that were developed for my treatment

## 2021-05-03 ENCOUNTER — TELEPHONE (OUTPATIENT)
Dept: OBGYN CLINIC | Facility: CLINIC | Age: 51
End: 2021-05-03

## 2021-05-03 NOTE — TELEPHONE ENCOUNTER
----- Message from Daysi Page sent at 5/3/2021  1:26 PM EDT -----  Regarding: Non-Urgent Medical Question  Contact: [phone]  Aashish Wilkerson I have a concern  I have menstruating since Thursday  This periods is the worst one ever  because I've been having large clots coming out, lot of lower abdomen pain  I have using the large poise pads and I have used more than 20 pads since  Yesterday my period was a bit better but still having clots  Should I be worried ? I never experience this before   Plz advice advice    Thank you

## 2021-05-03 NOTE — TELEPHONE ENCOUNTER
Contacted patient regarding bleeding,  Dr Brianna Austin recommended she be seen for apt, she stated her bleeding is better today and she works full shift tomorrow  Patient opted to wait until her scheduled apt on Thursday    Instructed to call if her bleeding is heavy again to be seen sooner

## 2021-05-06 ENCOUNTER — PROCEDURE VISIT (OUTPATIENT)
Dept: OBGYN CLINIC | Facility: CLINIC | Age: 51
End: 2021-05-06
Payer: COMMERCIAL

## 2021-05-06 VITALS
BODY MASS INDEX: 33.66 KG/M2 | DIASTOLIC BLOOD PRESSURE: 82 MMHG | HEIGHT: 63 IN | SYSTOLIC BLOOD PRESSURE: 120 MMHG | WEIGHT: 190 LBS

## 2021-05-06 DIAGNOSIS — R93.89 THICKENED ENDOMETRIUM: Primary | ICD-10-CM

## 2021-05-06 DIAGNOSIS — N92.4 ABNORMAL PERIMENOPAUSAL BLEEDING: ICD-10-CM

## 2021-05-06 PROCEDURE — 88305 TISSUE EXAM BY PATHOLOGIST: CPT | Performed by: PATHOLOGY

## 2021-05-06 PROCEDURE — 58100 BIOPSY OF UTERUS LINING: CPT | Performed by: OBSTETRICS & GYNECOLOGY

## 2021-05-06 NOTE — PROGRESS NOTES
Endometrial biopsy    Date/Time: 5/6/2021 11:53 AM  Performed by: Priti Green MD  Authorized by: Priti rGeen MD   Universal Protocol:  Consent: Verbal consent obtained  Written consent not obtained  Risks and benefits: risks, benefits and alternatives were discussed  Consent given by: patient  Patient understanding: patient states understanding of the procedure being performed  Patient consent: the patient's understanding of the procedure matches consent given  Procedure consent: procedure consent matches procedure scheduled  Relevant documents: relevant documents present and verified  Test results: test results available and properly labeled  Site marked: the operative site was not marked  Radiology Images displayed and confirmed  If images not available, report reviewed: imaging studies available  Required items: required blood products, implants, devices, and special equipment available  Patient identity confirmed: verbally with patient      Indication:     Indications:  Other disorder of menstruation and other abnormal bleeding from female genital tract      Indications comment:  Perimenopausal bleeding  Procedure:     Procedure: endometrial biopsy with Pipelle      A bivalve speculum was placed in the vagina: yes      Cervix cleaned and prepped: yes      A paracervical block was performed: no      An intracervical block was performed: no      The cervix was dilated: no      Uterus sounded: yes      Uterus sound depth (cm):  7    Specimen collected: specimen collected and sent to pathology      Patient tolerated procedure well with no complications: yes    Findings:     Uterus size:  6-8 weeks    Cervix: normal      Adnexa: normal    Comments:     Procedure comments:  Topic:  Abnormal perimenopausal bleeding with thickened endometrial stripe on pelvic ultrasound     Endometrial biopsy performed without difficulty     Hemostasis confirmed at the conclusion of procedure     All questions answered for the patient at today's visit     Instructions and restrictions given to her as well     Further treatment and follow-up planning will be based upon final pathology results     Patient told to call for any problems, questions, issues or concerns which may arise for her

## 2021-05-06 NOTE — PATIENT INSTRUCTIONS
Topic:  Abnormal perimenopausal bleeding with thickened endometrial stripe on pelvic ultrasound     Endometrial biopsy performed today without difficulty     Uterine bleeding seen on exam     Good hemostasis confirmed at the conclusion procedure     All questions answered for the patient     Instructions and restrictions given to her     Further treatment and follow-up planning will be based upon final pathology results     Patient to call for any problems, questions, issues or concerns which may arise for her

## 2021-05-10 ENCOUNTER — OFFICE VISIT (OUTPATIENT)
Dept: GASTROENTEROLOGY | Facility: CLINIC | Age: 51
End: 2021-05-10

## 2021-05-10 VITALS
WEIGHT: 189.8 LBS | BODY MASS INDEX: 33.63 KG/M2 | DIASTOLIC BLOOD PRESSURE: 64 MMHG | HEIGHT: 63 IN | TEMPERATURE: 98.2 F | HEART RATE: 61 BPM | SYSTOLIC BLOOD PRESSURE: 118 MMHG

## 2021-05-10 DIAGNOSIS — Z12.11 SCREENING FOR COLON CANCER: ICD-10-CM

## 2021-05-10 DIAGNOSIS — R07.89 OTHER CHEST PAIN: ICD-10-CM

## 2021-05-10 DIAGNOSIS — K21.9 GASTROESOPHAGEAL REFLUX DISEASE, UNSPECIFIED WHETHER ESOPHAGITIS PRESENT: Primary | ICD-10-CM

## 2021-05-10 DIAGNOSIS — K59.04 CHRONIC IDIOPATHIC CONSTIPATION: ICD-10-CM

## 2021-05-10 PROCEDURE — 99243 OFF/OP CNSLTJ NEW/EST LOW 30: CPT | Performed by: INTERNAL MEDICINE

## 2021-05-10 NOTE — PROGRESS NOTES
[facility] - Outpatient Consultation  Nick Rose 45 y o  female MRN: [id number]  Encounter: [id number]          ASSESSMENT AND PLAN:      1  Gastroesophageal reflux disease, unspecified whether esophagitis present  2  Screening for colon cancer  3  Chronic idiopathic constipation  4  Other chest pain    She is currently getting worked up for atypical chest discomfort this may be secondary to arrhythmias she is awaiting Holter monitor results  She did have a cardiac catheterization which was within normal limits  Other differential may include related to stress  Her symptoms are well managed with and acid in the past and omeprazole therapy now  These findings are more consistent with reflux as a source of her symptoms  Since her symptoms are overall controlled at this time she would like to pursue a more conservative approach  I discuss she has 2 options 1 option would be to continue PPI therapy at least for another year and we can proceed with endoscopic evaluation and colonoscopy next year together  Other option would be to proceed with EGD evaluation now  She would like to take a more conservative approach  She will also try to wean off PPI therapy in about 3-4 months  If she is unable to do this he can continue PPI therapy until we can proceed with endoscopic evaluation next year  We also discussed anti-reflux measures  Her last colonoscopy was approximately 4 years ago and was recommended to repeat colonoscopy in 5 years  Colonoscopy at that time was within normal limits  She does have history of constipation as well constipation is well controlled with MiraLax and increasing fluid and fiber     -continue adequate amount of fluid and fiber intake  -EGD evaluation recommended next year with colonoscopy  -try to wean off PPI therapy  -anti reflux measures    REVIEW OF SYSTEMS:    CONSTITUTIONAL: Denies any fever, chills, rigors, and weight loss    HEENT: No earache or tinnitus  Denies hearing loss or visual disturbances  CARDIOVASCULAR: No chest pain or palpitations  RESPIRATORY: Denies any cough, hemoptysis, shortness of breath or dyspnea on exertion  GASTROINTESTINAL: As noted in the History of Present Illness  GENITOURINARY: No problems with urination  Denies any hematuria or dysuria  NEUROLOGIC: No dizziness or vertigo, denies headaches  MUSCULOSKELETAL: Denies any muscle or joint pain  SKIN: Denies skin rashes or itching  ENDOCRINE: Denies excessive thirst  Denies intolerance to heat or cold  PSYCHOSOCIAL: Denies depression or anxiety  Denies any recent memory loss         Historical Information   Past Medical History:   Diagnosis Date    Anemia     Sarcoidosis      Past Surgical History:   Procedure Laterality Date    ANKLE SURGERY Right 2018    COLONOSCOPY  2018    KNEE ARTHROSCOPY W/ MENISCAL REPAIR Left 2013    TUBAL LIGATION      UPPER GASTROINTESTINAL ENDOSCOPY       Social History   Social History     Substance and Sexual Activity   Alcohol Use Yes    Frequency: Monthly or less    Comment: socially     Social History     Substance and Sexual Activity   Drug Use Never     Social History     Tobacco Use   Smoking Status Former Smoker    Packs/day: 0 25    Years: 20 00    Pack years: 5 00    Types: Cigarettes    Quit date:     Years since quittin 3   Smokeless Tobacco Never Used     Family History   Problem Relation Age of Onset    Dementia Mother     Seizures Mother     Hypertension Mother     Osteoporosis Mother     Glaucoma Mother     Macular degeneration Mother     Hearing loss Mother     Hyperlipidemia Mother     No Known Problems Sister     No Known Problems Daughter     No Known Problems Maternal Grandmother     No Known Problems Maternal Grandfather     No Known Problems Daughter     No Known Problems Maternal Aunt        Meds/Allergies       Current Outpatient Medications:     Ascorbic Acid (VITAMIN C PO)    atorvastatin (LIPITOR) 40 mg tablet    Biotin 5 MG/ML LIQD    multivitamin (THERAGRAN) TABS    omeprazole (PriLOSEC) 20 mg delayed release capsule    Polyethylene Glycol 3350 (MIRALAX PO)    Allergies   Allergen Reactions    Penicillin G Hives           Objective     Blood pressure 118/64, pulse 61, temperature 98 2 °F (36 8 °C), temperature source Tympanic, height 5' 3" (1 6 m), weight 86 1 kg (189 lb 12 8 oz)  Body mass index is 33 62 kg/m²  PHYSICAL EXAM:      General Appearance:   Alert, cooperative, no distress   HEENT:   Normocephalic, atraumatic, anicteric      Neck:  Supple, symmetrical, trachea midline   Lungs:   Clear to auscultation bilaterally; no rales, rhonchi or wheezing; respirations unlabored    Heart[de-identified]   Regular rate and rhythm; no murmur, rub, or gallop  Abdomen:   Soft, non-tender, non-distended; normal bowel sounds; no masses, no organomegaly    Genitalia:   Deferred    Rectal:   Deferred    Extremities:  No cyanosis, clubbing or edema    Pulses:  2+ and symmetric    Skin:  No jaundice, rashes, or lesions    Lymph nodes:  No palpable cervical lymphadenopathy        Lab Results:   No visits with results within 1 Day(s) from this visit     Latest known visit with results is:   Procedure visit on 05/06/2021   Component Date Value    Case Report 05/06/2021                      Value:Surgical Pathology Report                         Case: [id number]                                   Authorizing Provider:  Bill Mckeon MD          Collected:           05/06/2021 1151              Ordering Location:     [facility] Received:            05/06/2021 1151                                     GYN                                                                          Pathologist:           Shasha Head MD                                                                 Specimen:    Endometrium, EBX  Final Diagnosis 05/06/2021                      Value: This result contains rich text formatting which cannot be displayed here   Additional Information 05/06/2021                      Value: This result contains rich text formatting which cannot be displayed here  Daria Hernández Gross Description 05/06/2021                      Value: This result contains rich text formatting which cannot be displayed here  Radiology Results:   No results found

## 2021-05-13 ENCOUNTER — TELEPHONE (OUTPATIENT)
Dept: OBGYN CLINIC | Facility: CLINIC | Age: 51
End: 2021-05-13

## 2021-05-13 ENCOUNTER — CLINICAL SUPPORT (OUTPATIENT)
Dept: CARDIOLOGY CLINIC | Facility: CLINIC | Age: 51
End: 2021-05-13
Payer: COMMERCIAL

## 2021-05-13 DIAGNOSIS — R00.2 PALPITATIONS: ICD-10-CM

## 2021-05-13 DIAGNOSIS — R55 SYNCOPE AND COLLAPSE: ICD-10-CM

## 2021-05-13 PROCEDURE — 93244 EXT ECG>48HR<7D REV&INTERPJ: CPT | Performed by: INTERNAL MEDICINE

## 2021-05-13 NOTE — TELEPHONE ENCOUNTER
----- Message from Angela Dong MD sent at 5/11/2021  2:12 PM EDT -----  Uterine biopsy was benign    It showed changes consistent with a polyp    Will see patient back in 6 months for brief office follow-up    No further treatment needed at this time    Please have her call for any problems, questions, issues or concerns which may arise for her    Thanks

## 2021-05-18 ENCOUNTER — TELEPHONE (OUTPATIENT)
Dept: OBGYN CLINIC | Facility: CLINIC | Age: 51
End: 2021-05-18

## 2021-05-21 ENCOUNTER — TELEPHONE (OUTPATIENT)
Dept: OBGYN CLINIC | Facility: CLINIC | Age: 51
End: 2021-05-21

## 2021-05-25 ENCOUNTER — TELEPHONE (OUTPATIENT)
Dept: OBGYN CLINIC | Facility: CLINIC | Age: 51
End: 2021-05-25

## 2021-05-25 PROBLEM — Z12.11 SCREENING FOR COLON CANCER: Status: RESOLVED | Noted: 2021-05-10 | Resolved: 2021-05-25

## 2021-05-25 NOTE — TELEPHONE ENCOUNTER
Spoke with patient regarding endometrial biopsy - benign  Patient has f/u in June for ovarian cyst   Explained that endometrial thickness can vary depending on hormonal status  Per Dr Paul Gonzalez, f/u in 6 months for recheck  Call with problems

## 2021-05-27 ENCOUNTER — OFFICE VISIT (OUTPATIENT)
Dept: PSYCHIATRY | Facility: CLINIC | Age: 51
End: 2021-05-27
Payer: COMMERCIAL

## 2021-05-27 DIAGNOSIS — F41.9 ANXIETY DISORDER, UNSPECIFIED TYPE: Primary | ICD-10-CM

## 2021-05-27 PROCEDURE — 99213 OFFICE O/P EST LOW 20 MIN: CPT | Performed by: PSYCHIATRY & NEUROLOGY

## 2021-05-27 NOTE — PSYCH
MEDICATION MANAGEMENT NOTE        [facility]      Name and Date of Birth:  Collins Lesch 45 y o  1970 MRN: [id number]    Date of Visit: May 27, 2021    Reason for Visit:  Follow-up    Subjective: The patient reports she has been doing well  She reports her endometrial biopsy came benign  She also reports her Holter monitoring was normal except for brief SVT 1 time  The patient reports her mood and anxiety has been better  She reports she still thinks and worries but is able to cope with it well  She denies feeling any hopelessness or suicidal thoughts  Reports sleep and appetite has been good  Reports energy level has been okay  Reports going to work and it is going overall well  Denies any significant concern today  I reviewed with the patient in detail about management options in the patient at this time does not feel she needs to be on medication  She reports she has struggle with anxiety most of her life and she is able to cope with it well and and feels that even if she is prescribed medication she might not be compliant with it  I discussed with the patient nonpharmacological treatment option including considering psychotherapy, mind fullness, yoga or meditation  The patient will think about it and if she decides for psychotherapy she will call to get a referral for it  She denies any other concern today  Review Of Systems:      Constitutional negative   ENT negative   Cardiovascular negative   Respiratory negative   Gastrointestinal negative   Genitourinary negative   Musculoskeletal negative   Integumentary negative   Neurological negative   Endocrine negative   Other Symptoms none       Alcohol/Substance Abuse: denies        Past Medical History:    Past Medical History:   Diagnosis Date    Anemia     Sarcoidosis      No past medical history pertinent negatives    Past Surgical History:   Procedure Laterality Date    ANKLE SURGERY Right 2018    COLONOSCOPY  2018    KNEE ARTHROSCOPY W/ MENISCAL REPAIR Left 2013    TUBAL LIGATION  1997    UPPER GASTROINTESTINAL ENDOSCOPY       Allergies   Allergen Reactions    Penicillin G Hives       Current Medications:       Current Outpatient Medications:     Ascorbic Acid (VITAMIN C PO), Take 1 tablet by mouth daily, Disp: , Rfl:     atorvastatin (LIPITOR) 40 mg tablet, Take 0 5 tablets (20 mg total) by mouth daily, Disp: 90 tablet, Rfl: 1    Biotin 5 MG/ML LIQD, Take 1 mL by mouth daily , Disp: , Rfl:     multivitamin (THERAGRAN) TABS, Take 1 tablet by mouth daily, Disp: , Rfl:     omeprazole (PriLOSEC) 20 mg delayed release capsule, Take 1 capsule (20 mg total) by mouth daily, Disp: 60 capsule, Rfl: 0    Polyethylene Glycol 3350 (MIRALAX PO), Take by mouth, Disp: , Rfl:        History Review: The following portions of the patient's history were reviewed and updated as appropriate: allergies, current medications, past family history, past medical history, past social history, past surgical history and problem list          OBJECTIVE:     Vital signs in last 24 hours: There were no vitals filed for this visit      Mental Status Evaluation:    Appearance age appropriate, casually dressed   Behavior cooperative, calm   Speech normal rate, normal volume, normal pitch   Mood euthymic   Affect normal range and intensity, appropriate   Thought Processes organized, goal directed   Associations intact associations   Thought Content no overt delusions   Perceptual Disturbances: no auditory hallucinations, no visual hallucinations   Abnormal Thoughts  Risk Potential Suicidal ideation - None  Homicidal ideation - None  Potential for aggression - No   Orientation oriented to person, place, time/date and situation   Memory recent and remote memory grossly intact   Consciousness alert and awake   Attention Span Concentration Span attention span and concentration are age appropriate   Intellect appears to be of average intelligence   Insight intact   Judgement intact   Muscle Strength and  Gait normal gait and normal balance   Motor activity no abnormal movements   Language no difficulty naming common objects, no difficulty repeating a phrase, no difficulty writing a sentence   Fund of Knowledge adequate knowledge of current events  adequate fund of knowledge regarding past history  adequate fund of knowledge regarding vocabulary    Pain none   Pain Scale 0       Laboratory Results:   Recent Labs (last 6 months):   Procedure visit on 05/06/2021   Component Date Value    Case Report 05/06/2021                      Value:Surgical Pathology Report                         Case: [id number]                                   Authorizing Provider:  Senia Gomez MD          Collected:           05/06/2021 1151              Ordering Location:     [facility] Received:            05/06/2021 1151                                     GYN                                                                          Pathologist:           Tonja Chiu MD                                                                 Specimen:    Endometrium, EBX                                                                           Final Diagnosis 05/06/2021                      Value: This result contains rich text formatting which cannot be displayed here   Additional Information 05/06/2021                      Value: This result contains rich text formatting which cannot be displayed here  Love Arabella Gross Description 05/06/2021                      Value: This result contains rich text formatting which cannot be displayed here     Admission on 04/09/2021, Discharged on 04/09/2021   Component Date Value    Sodium 04/09/2021 142     Potassium 04/09/2021 4 5     Chloride 04/09/2021 111*    CO2 04/09/2021 23     ANION GAP 04/09/2021 8     BUN 04/09/2021 14     Creatinine 04/09/2021 0 82     Glucose 04/09/2021 91     Glucose, Fasting 04/09/2021 91     Calcium 04/09/2021 8 6     eGFR 04/09/2021 96     Ventricular Rate 04/09/2021 50     Atrial Rate 04/09/2021 50     AZ Interval 04/09/2021 154     QRSD Interval 04/09/2021 88     QT Interval 04/09/2021 458     QTC Interval 04/09/2021 417     QRS Axis 04/09/2021 14     T Wave Axis 04/09/2021 0    Annual Exam on 04/07/2021   Component Date Value    Case Report 04/07/2021                      Value:Gynecologic Cytology Report                       Case: Lin Jalloh Provider:  GWENDOLYN Vazquez  Collected:           04/07/2021 1058              Ordering Location:     [facility] Received:            04/07/2021 1058                                     GYN                                                                          First Screen:          SCOTT Leach                                                    Specimen:    LIQUID-BASED PAP, SCREENING, Cervix                                                        Primary Interpretation 04/07/2021 Negative for intraepithelial lesion or malignancy     Specimen Adequacy 04/07/2021 Satisfactory for evaluation  Endocervical/transformation zone component present   Additional Information 04/07/2021                      Value: This result contains rich text formatting which cannot be displayed here      HPV Other HR 04/07/2021 Negative     HPV16 04/07/2021 Negative     HPV18 04/07/2021 Negative    Appointment on 04/05/2021   Component Date Value    Protime 04/05/2021 14 1     INR 04/05/2021 1 11     PTT 04/05/2021 28    Appointment on 04/05/2021   Component Date Value    Sodium 04/05/2021 139     Potassium 04/05/2021 4 5     Chloride 04/05/2021 106     CO2 04/05/2021 25     ANION GAP 04/05/2021 8     BUN 04/05/2021 14     Creatinine 04/05/2021 0 89     Glucose, Fasting 04/05/2021 85     Calcium 04/05/2021 8 8     AST 04/05/2021 19     ALT 04/05/2021 20     Alkaline Phosphatase 04/05/2021 103     Total Protein 04/05/2021 7 4     Albumin 04/05/2021 3 5     Total Bilirubin 04/05/2021 0 20     eGFR 04/05/2021 87     WBC 04/05/2021 5 51     RBC 04/05/2021 4 99     Hemoglobin 04/05/2021 11 9     Hematocrit 04/05/2021 41 3     MCV 04/05/2021 83     MCH 04/05/2021 23 8*    MCHC 04/05/2021 28 8*    RDW 04/05/2021 18 8*    Platelets 97/09/0135 221     MPV 04/05/2021 11 5     Cholesterol 04/05/2021 207*    Triglycerides 04/05/2021 226*    HDL, Direct 04/05/2021 42     LDL Calculated 04/05/2021 120*    Hemoglobin A1C 04/05/2021 5 4     EAG 04/05/2021 108     TSH 3RD GENERATON 04/05/2021 1 294    Hospital Outpatient Visit on 03/15/2021   Component Date Value    Protocol Name 03/15/2021 XIMENA     Time In Exercise Phase 03/15/2021 00:08:03     MAX   SYSTOLIC BP 94/67/3818 827     Max Diastolic Bp 10/40/9388 86     Max Heart Rate 03/15/2021 144     Max Predicted Heart Rate 03/15/2021 169     Reason for Termination 03/15/2021                      Value:Fatigue  Dyspnea  Target Heart Rate Achieved      Test Indication 03/15/2021 Palpitations     Target Hr Formular 03/15/2021 (220 - Age)*85%     Chest Pain Statement 03/15/2021 none    Orders Only on 01/05/2021   Component Date Value    SARS-CoV-2  01/05/2021 Not Detected    Appointment on 11/28/2020   Component Date Value    Mumps IgG 11/28/2020 IMMUNE      Most Recent Labs:   Lab Results   Component Value Date    WBC 5 51 04/05/2021    RBC 4 99 04/05/2021    HGB 11 9 04/05/2021    HCT 41 3 04/05/2021     04/05/2021    RDW 18 8 (H) 04/05/2021    K 4 5 04/09/2021     (H) 04/09/2021    CO2 23 04/09/2021    BUN 14 04/09/2021    CREATININE 0 82 04/09/2021    CALCIUM 8 6 04/09/2021    AST 19 04/05/2021    ALT 20 04/05/2021    ALKPHOS 103 04/05/2021    HDL 42 04/05/2021    TRIG 226 (H) 04/05/2021    LDLCALC 120 (H) 04/05/2021    [id number] 1 294 04/05/2021     I have personally reviewed all pertinent laboratory/tests results  Assessment/Plan:  The patient overall doing well  From evaluation it seems that the patient struggle at last visit was indeed adjustment disorder with depressed and anxious mood though seems to be significantly better now  She currently still reports anxiety but reports it is able to manage it well and does not want to try medication  Will think about psychotherapy  Plan is to follow with the patient 1 more time in 3 months and if the patient continues to do well then will close the case  The patient verbalized understanding agrees with the plan  She was advised to call us if there is any concern and to call [facility] or [facility] in case of any emergency  The patient agreed  Diagnoses and all orders for this visit:    Anxiety disorder, unspecified type          Treatment Recommendations/Precautions:      Aware of 24 hour and weekend coverage for urgent situations accessed by calling [facility] main practice number    Risks/Benefits      Risks, Benefits And Possible Side Effects Of Medications:    No medication was started today    Controlled Medication Discussion:     Not applicable    Psychotherapy Provided:     Individual psychotherapy provided: Counseling was provided during the session today for 10 minutes  Medication education provided to [facility]  Goals discussed during in session: improve anxiety  Crisis/safety plan discussed with Bhavna An;    Completed and signed during the session: Not applicable - Treatment Plan not due at this session    Vivi Aguilar MD 05/27/21

## 2021-06-08 ENCOUNTER — APPOINTMENT (EMERGENCY)
Dept: RADIOLOGY | Facility: HOSPITAL | Age: 51
End: 2021-06-08

## 2021-06-08 ENCOUNTER — HOSPITAL ENCOUNTER (EMERGENCY)
Facility: HOSPITAL | Age: 51
Discharge: HOME | End: 2021-06-08
Attending: EMERGENCY MEDICINE | Admitting: EMERGENCY MEDICINE
Payer: COMMERCIAL

## 2021-06-08 VITALS
DIASTOLIC BLOOD PRESSURE: 68 MMHG | BODY MASS INDEX: 33.66 KG/M2 | HEIGHT: 63 IN | SYSTOLIC BLOOD PRESSURE: 155 MMHG | OXYGEN SATURATION: 98 % | RESPIRATION RATE: 19 BRPM | WEIGHT: 190 LBS | HEART RATE: 61 BPM

## 2021-06-08 DIAGNOSIS — S29.019A STRAIN OF THORACIC REGION, INITIAL ENCOUNTER: Primary | ICD-10-CM

## 2021-06-08 PROCEDURE — 99284 EMERGENCY DEPT VISIT MOD MDM: CPT | Performed by: PHYSICIAN ASSISTANT

## 2021-06-08 PROCEDURE — 99283 EMERGENCY DEPT VISIT LOW MDM: CPT

## 2021-06-08 PROCEDURE — 72070 X-RAY EXAM THORAC SPINE 2VWS: CPT

## 2021-06-08 PROCEDURE — 71046 X-RAY EXAM CHEST 2 VIEWS: CPT

## 2021-06-08 RX ORDER — PREDNISONE 50 MG/1
50 TABLET ORAL DAILY
Qty: 5 TABLET | Refills: 0 | Status: SHIPPED | OUTPATIENT
Start: 2021-06-08 | End: 2021-06-13

## 2021-06-08 RX ORDER — CYCLOBENZAPRINE HCL 10 MG
10 TABLET ORAL 2 TIMES DAILY PRN
Qty: 10 TABLET | Refills: 0 | Status: SHIPPED | OUTPATIENT
Start: 2021-06-08 | End: 2022-01-12

## 2021-06-08 RX ADMIN — PREDNISONE 50 MG: 20 TABLET ORAL at 08:38

## 2021-06-08 NOTE — ED PROVIDER NOTES
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  Back Pain  Location:  Thoracic spine  Quality:  Aching, shooting and stiffness  Stiffness is present:  Unable to specify  Pain severity:  Moderate  Worse during: worse as the day goes on  Onset quality:  Gradual  Duration: Since turning a patient on 5/28  Timing:  Intermittent  Progression:  Waxing and waning  Chronicity:  New  Context: lifting heavy objects, occupational injury and twisting    Relieved by:  Nothing  Worsened by:  Lying down, movement, sitting and bending  Ineffective treatments:  Ibuprofen, NSAIDs and OTC medications  Associated symptoms: no abdominal pain, no abdominal swelling, no bladder incontinence, no bowel incontinence, no chest pain, no dysuria, no fever, no headaches, no leg pain, no numbness, no paresthesias, no tingling and no weakness    Risk factors: no recent surgery        Prior to Admission Medications   Prescriptions Last Dose Informant Patient Reported? Taking?    Ascorbic Acid (VITAMIN C PO)  Self Yes Yes   Sig: Take 1 tablet by mouth daily   Biotin 5 MG/ML LIQD  Self Yes Yes   Sig: Take 1 mL by mouth daily    Polyethylene Glycol 3350 (MIRALAX PO) Unknown at Unknown time Self Yes No   Sig: Take by mouth   atorvastatin (LIPITOR) 40 mg tablet  Self No Yes   Sig: Take 0 5 tablets (20 mg total) by mouth daily   multivitamin (THERAGRAN) TABS  Self Yes Yes   Sig: Take 1 tablet by mouth daily   omeprazole (PriLOSEC) 20 mg delayed release capsule  Self No No   Sig: Take 1 capsule (20 mg total) by mouth daily      Facility-Administered Medications: None       Past Medical History:   Diagnosis Date    Anemia     HLD (hyperlipidemia)     Sarcoidosis        Past Surgical History:   Procedure Laterality Date    ANKLE SURGERY Right 2018    COLONOSCOPY  2018    KNEE ARTHROSCOPY W/ MENISCAL REPAIR Left 2013    TUBAL LIGATION     UPPER GASTROINTESTINAL ENDOSCOPY         Family History   Problem Relation Age of Onset    Dementia Mother     Seizures Mother     Hypertension Mother     Osteoporosis Mother     Glaucoma Mother     Macular degeneration Mother     Hearing loss Mother     Hyperlipidemia Mother     No Known Problems Sister     No Known Problems Daughter     No Known Problems Maternal Grandmother     No Known Problems Maternal Grandfather     No Known Problems Daughter     No Known Problems Maternal Aunt      I have reviewed and agree with the history as documented  E-Cigarette/Vaping    E-Cigarette Use Never User      E-Cigarette/Vaping Substances    Nicotine No     THC No     CBD No     Flavoring No     Other No     Unknown No      Social History     Tobacco Use    Smoking status: Former Smoker     Packs/day: 0 25     Years: 20 00     Pack years: 5 00     Types: Cigarettes     Quit date:      Years since quittin 4    Smokeless tobacco: Never Used   Substance Use Topics    Alcohol use: Yes     Frequency: Monthly or less     Comment: socially    Drug use: Never       Review of Systems   Constitutional: Negative for chills and fever  HENT: Negative for ear pain and sore throat  Eyes: Negative for pain and visual disturbance  Respiratory: Negative for cough and shortness of breath  Cardiovascular: Negative for chest pain and palpitations  Gastrointestinal: Negative for abdominal pain, bowel incontinence and vomiting  Genitourinary: Negative for bladder incontinence, dysuria and hematuria  Musculoskeletal: Positive for back pain  Negative for arthralgias, gait problem and joint swelling  Skin: Negative for color change and rash  Neurological: Negative for tingling, seizures, syncope, weakness, numbness, headaches and paresthesias  All other systems reviewed and are negative  Physical Exam  Physical Exam  Vitals signs and nursing note reviewed     Constitutional: General: She is not in acute distress  Appearance: Normal appearance  She is not ill-appearing, toxic-appearing or diaphoretic  HENT:      Head: Normocephalic and atraumatic  Mouth/Throat:      Mouth: Mucous membranes are moist    Eyes:      General: No scleral icterus  Pupils: Pupils are equal, round, and reactive to light  Neck:      Musculoskeletal: Normal range of motion  Cardiovascular:      Rate and Rhythm: Normal rate and regular rhythm  Pulses: Normal pulses  Heart sounds: Normal heart sounds  Pulmonary:      Effort: Pulmonary effort is normal       Breath sounds: Normal breath sounds  Abdominal:      General: Abdomen is flat  There is no distension  Palpations: Abdomen is soft  Tenderness: There is no abdominal tenderness  There is no guarding or rebound  Hernia: No hernia is present  Musculoskeletal: Normal range of motion  General: Tenderness (right thoracic back pain, DROM secondary to spasm and pain) present  No swelling  Skin:     General: Skin is warm  Capillary Refill: Capillary refill takes less than 2 seconds  Neurological:      General: No focal deficit present  Mental Status: She is alert     Psychiatric:         Mood and Affect: Mood normal          Vital Signs  ED Triage Vitals [06/08/21 0700]   Temp Pulse Respirations Blood Pressure SpO2   -- 61 19 155/68 98 %      Temp src Heart Rate Source Patient Position - Orthostatic VS BP Location FiO2 (%)   -- Monitor -- Left arm --      Pain Score       7           Vitals:    06/08/21 0700   BP: 155/68   Pulse: 61         Visual Acuity  Visual Acuity      Most Recent Value   L Pupil Size (mm)  3   R Pupil Size (mm)  3          ED Medications  Medications   predniSONE tablet 50 mg (50 mg Oral Given 6/8/21 1676)       Diagnostic Studies  Results Reviewed     None                 XR thoracic spine 2 views   ED Interpretation by Martha Conde PA-C (06/08 0830)   No evidence of acute abnormality      Final Result by Citlali Enamorado MD (06/08 8585)      No acute osseous abnormality  Workstation performed: [id number]         XR chest 2 views   Final Result by Ayleen Miller MD (06/08 4296)      No acute cardiopulmonary disease  Workstation performed: [id number]                    Procedures  Procedures         ED Course  ED Course as of Jun 11 1612   Tue Jun 08, 2021   0818 Cxr unremarkable                                SBIRT 22yo+      Most Recent Value   SBIRT (23 yo +)   In order to provide better care to our patients, we are screening all of our patients for alcohol and drug use  Would it be okay to ask you these screening questions? No Filed at: 06/08/2021 0710   Initial Alcohol Screen: US AUDIT-C    1  How often do you have a drink containing alcohol?  0 Filed at: 06/08/2021 0710   2  How many drinks containing alcohol do you have on a typical day you are drinking? 0 Filed at: 06/08/2021 0710   3a  Male UNDER 65: How often do you have five or more drinks on one occasion? 0 Filed at: 06/08/2021 0710   3b  FEMALE Any Age, or MALE 65+: How often do you have 4 or more drinks on one occassion? 0 Filed at: 06/08/2021 0710   Audit-C Score  0 Filed at: 06/08/2021 0710   HOLGER: How many times in the past year have you    Used an illegal drug or used a prescription medication for non-medical reasons? Never Filed at: 06/08/2021 0710                    MDM  Number of Diagnoses or Management Options  Strain of thoracic region, initial encounter: new and requires workup  Diagnosis management comments: The patient was seen and examined  Imaging revealed no abnormalities  The patient was treated with prednisone and cyclobenzaprine  The patient was re-examined after treatment and disposition of discharge to home  was made  The test results were discussed with the patient/family  All questions were answered to patient/family's satisfaction   Anticipatory guidance and return precautions were discussed at length  They verbalized understanding and agreement with the plan  The patient remained stable while under my care in the Emergency Department  Amount and/or Complexity of Data Reviewed  Tests in the radiology section of CPT®: ordered and reviewed    Risk of Complications, Morbidity, and/or Mortality  Presenting problems: moderate  Diagnostic procedures: low  Management options: moderate    Patient Progress  Patient progress: improved      Disposition  Final diagnoses:   Strain of thoracic region, initial encounter     Time reflects when diagnosis was documented in both MDM as applicable and the Disposition within this note     Time User Action Codes Description Comment    6/8/2021  8:31 AM Eliot Severino Add [S29 019A] Strain of thoracic region, initial encounter       ED Disposition     ED Disposition Condition Date/Time Comment    Discharge Stable Tue Jun 8, 2021  8:31 AM Nupur Mariscal discharge to home/self care              Follow-up Information     Follow up With Specialties Details Why [facility]   If symptoms worsen [address] [place] [address]  [phone]          Discharge Medication List as of 6/8/2021  8:37 AM      START taking these medications    Details   cyclobenzaprine (FLEXERIL) 10 mg tablet Take 1 tablet (10 mg total) by mouth 2 (two) times a day as needed for muscle spasms for up to 5 days, Starting Tue 6/8/2021, Until Sun 6/13/2021, Normal      predniSONE 50 mg tablet Take 1 tablet (50 mg total) by mouth daily for 5 days, Starting Tue 6/8/2021, Until Sun 6/13/2021, Normal         CONTINUE these medications which have NOT CHANGED    Details   Ascorbic Acid (VITAMIN C PO) Take 1 tablet by mouth daily, Historical Med      atorvastatin (LIPITOR) 40 mg tablet Take 0 5 tablets (20 mg total) by mouth daily, Starting Wed 4/7/2021, Normal      Biotin 5 MG/ML LIQD Take 1 mL by mouth daily , Historical Med      multivitamin (THERAGRAN) TABS Take 1 tablet by mouth daily, Historical Med      omeprazole (PriLOSEC) 20 mg delayed release capsule Take 1 capsule (20 mg total) by mouth daily, Starting Wed 2/17/2021, Until Mon 5/10/2021, Normal      Polyethylene Glycol 3350 (MIRALAX PO) Take by mouth, Historical Med           No discharge procedures on file      PDMP Review       Value Time User    PDMP Reviewed  Yes 4/27/2021  9:52 AM Karen Gregory MD          ED Provider  Electronically Signed by           Baltazar Espana PA-C  06/11/21 [id number]

## 2021-06-08 NOTE — DISCHARGE INSTRUCTIONS
XR thoracic spine 2 views   ED Interpretation   No evidence of acute abnormality      XR chest 2 views   Final Result      No acute cardiopulmonary disease                 Workstation performed: [id number]

## 2021-07-28 NOTE — BRIEF OPERATIVE NOTE - PROCEDURE
ORIF dislocation of ankle joint  07/03/2018    Active  BPETRONE <<-----Click on this checkbox to enter Procedure Below Average

## 2021-10-07 ENCOUNTER — TELEPHONE (OUTPATIENT)
Dept: INTERNAL MEDICINE CLINIC | Facility: CLINIC | Age: 51
End: 2021-10-07

## 2021-10-07 ENCOUNTER — HOSPITAL ENCOUNTER (OUTPATIENT)
Dept: MAMMOGRAPHY | Facility: CLINIC | Age: 51
Discharge: HOME | End: 2021-10-07
Payer: COMMERCIAL

## 2021-10-07 VITALS — WEIGHT: 151 LBS | HEIGHT: 63 IN | BODY MASS INDEX: 26.75 KG/M2

## 2021-10-07 DIAGNOSIS — Z09 FOLLOW-UP EXAM, 3-6 MONTHS SINCE PREVIOUS EXAM: ICD-10-CM

## 2021-10-07 DIAGNOSIS — E78.5 HYPERLIPIDEMIA, UNSPECIFIED HYPERLIPIDEMIA TYPE: Primary | ICD-10-CM

## 2021-10-07 DIAGNOSIS — F41.9 ANXIETY DISORDER, UNSPECIFIED TYPE: ICD-10-CM

## 2021-10-07 PROCEDURE — G0279 TOMOSYNTHESIS, MAMMO: HCPCS

## 2021-10-07 PROCEDURE — 76642 ULTRASOUND BREAST LIMITED: CPT

## 2021-10-07 PROCEDURE — 77065 DX MAMMO INCL CAD UNI: CPT

## 2021-12-01 ENCOUNTER — IMMUNIZATIONS (OUTPATIENT)
Dept: FAMILY MEDICINE CLINIC | Facility: HOSPITAL | Age: 51
End: 2021-12-01

## 2021-12-01 DIAGNOSIS — Z23 ENCOUNTER FOR IMMUNIZATION: Primary | ICD-10-CM

## 2021-12-01 PROCEDURE — 0064A COVID-19 MODERNA VACC 0.25 ML BOOSTER: CPT

## 2021-12-01 PROCEDURE — 91306 COVID-19 MODERNA VACC 0.25 ML BOOSTER: CPT

## 2022-01-12 ENCOUNTER — TELEMEDICINE (OUTPATIENT)
Dept: INTERNAL MEDICINE CLINIC | Facility: CLINIC | Age: 52
End: 2022-01-12
Payer: COMMERCIAL

## 2022-01-12 VITALS
OXYGEN SATURATION: 99 % | TEMPERATURE: 96.8 F | WEIGHT: 138 LBS | HEART RATE: 57 BPM | DIASTOLIC BLOOD PRESSURE: 77 MMHG | SYSTOLIC BLOOD PRESSURE: 139 MMHG | BODY MASS INDEX: 24.45 KG/M2

## 2022-01-12 DIAGNOSIS — E55.9 VITAMIN D DEFICIENCY: ICD-10-CM

## 2022-01-12 DIAGNOSIS — E78.49 OTHER HYPERLIPIDEMIA: ICD-10-CM

## 2022-01-12 DIAGNOSIS — K21.9 GASTROESOPHAGEAL REFLUX DISEASE, UNSPECIFIED WHETHER ESOPHAGITIS PRESENT: Primary | ICD-10-CM

## 2022-01-12 DIAGNOSIS — D50.9 IRON DEFICIENCY ANEMIA, UNSPECIFIED IRON DEFICIENCY ANEMIA TYPE: ICD-10-CM

## 2022-01-12 PROCEDURE — 99213 OFFICE O/P EST LOW 20 MIN: CPT | Performed by: INTERNAL MEDICINE

## 2022-01-12 NOTE — PROGRESS NOTES
Operative Note     9/25/2020    PRE-OP DIAGNOSIS: Malignant neoplasm of upper-outer quadrant of right breast in female, estrogen receptor positive [C50.411, Z17.0]      POST-OP DIAGNOSIS: Post-Op Diagnosis Codes:     * Malignant neoplasm of upper-outer quadrant of right breast in female, estrogen receptor positive [C50.411, Z17.0]    Procedure(s):  MASTECTOMY-Right  BIOPSY, LYMPH NODE, SENTINEL-Right     SURGEON: Surgeon(s) and Role:     * Krysta Clark MD - Primary    ANESTHESIA: General     OPERATIVE FINDINGS: 1 SLN count 452 and blue. Negative on frozen section.     INDICATION FOR PROCEDURE: This patient presents with a history of cancer of the right breast    PROCEDURE IN DETAIL:  Darlene Avila is a 69 y.o. female brought to the operating room for definitive surgery of cancer of the right breast.  The patient has elected to undergo right simple mastectomy with sentinel lymph node biopsy for serenity assessment. The patient was informed of the possible risks and complications of the procedure, including but not limited to anesthetic risks, bleeding, infection, and need for additional surgery.  The patient concurred with the proposed plan, and has given informed consent.  The site of surgery was properly noted/marked in the preoperative holding area.    Prior to arriving in the operating room, the patient's right breast was injected with technetium to facilitate sentinel lymph node identification. The patient was then brought to the operating room and placed in the supine position with both upper extremities extended.  local and general anesthesia was administered. Perioperative antibiotics were administered consisting of Ancef and a time out was performed confirming the patient, site, and procedure.  The bilateral chest and axilla was then prepped and draped in the usual sterile fashion.    We then turned our attention to the right breast where an elliptical incision was fashioned to incorporate the nipple  Virtual Regular Visit    Verification of patient location:    Patient is located in the following state in which I hold an active license [place]      Assessment/Plan:    Problem List Items Addressed This Visit        Digestive    GERD (gastroesophageal reflux disease) - Primary       Other    Other hyperlipidemia     Recheck blood work prior to next visit         Iron deficiency anemia    Relevant Orders    Iron Panel (Includes Ferritin, Iron Sat%, Iron, and TIBC)      Other Visit Diagnoses     Vitamin D deficiency        Relevant Orders    Vitamin D 25 hydroxy               Reason for visit is   Chief Complaint   Patient presents with    Virtual Brief Visit     Follow up on chronic medical conditions  [address] screen    Virtual Regular Visit        Encounter provider Rosetta Howard MD    Provider located at [address] [address]      Recent Visits  No visits were found meeting these conditions  Showing recent visits within past 7 days and meeting all other requirements  Today's Visits  Date Type Provider Dept   01/12/22 Telemedicine Rosetta Howard  [facility] today's visits and meeting all other requirements  Future Appointments  No visits were found meeting these conditions  Showing future appointments within next 150 days and meeting all other requirements       The patient was identified by name and date of birth  Kandi Crane was informed that this is a telemedicine visit and that the visit is being conducted through [facility] and patient was informed this is a secure, HIPAA-complaint platform  She agrees to proceed     My office door was closed  No one else was in the room  She acknowledged consent and understanding of privacy and security of the video platform   The patient has agreed to participate and understands they can areolar complex.  The incision was made with a 10-blade and extended through the subcutaneous tissues with Bovie electrocautery.  Skin flaps were raised to the clavicle superiorly.  We then  turned our attention to the right axilla.  Blue dye was injected prior to the incision in the usual subareolar fashion. The gamma probe was used to identify an area of increased radioactivity within the lower axilla. The clavipectoral sheath was sharply incised to reveal the level I axillary lymph nodes. The probe was used to identify a single node with increased radioactivity.  This node was brought into the operative field and carefully dissected free of the surrounding lymphovascular structures.  The highest ex vivo count of the node was 452.  The node was then sent to pathology for frozen section evaluation, labeled as sentinel node #1.  A total of 1 axillary sentinel nodes and 0 axillary non-sentinel nodes were identified, excised and submitted to pathology.  Bed counts were obtained to confirm that the 10% rule had not been violated.   The wound was irrigated with normal saline, and all bleeding points were secured with Bovie electrocautery.     We then proceeded to raise the remainder of the flaps to the lateral border of the sternum medially, to the inframammary fold inferiorly, and to the anterior border of the latissimus dorsi muscle laterally. The breast tissue was sharply excised off the chest wall taking care to incorporate the pectoralis fascia while leaving the serratus fascia behind.  The resulting mastectomy specimen was marked using a short stitch superiorly and long stitch laterally.  The breast was sent to pathology for permanent evaluation.      Frozen section serenity evaluation revealed no evidence of metastatic disease.  Therefore, the operative field was irrigated with normal saline and all bleeding points were secured with Bovie electrocautery. Marcaine was injected at the end of the procedure.  A 15 Fr  discontinue the visit at any time  Patient is aware this is a billable service  Subjective  Vladimir Villatoro is a 45 y o  female    HPI     This is follow-up visit for her  There was an abnormal stress test last year for which she underwent cardiac catheterization which failed to reveal any significant blockages  She has since lost a great deal of weight and is now at 138 lb and feels well  She also denies any symptoms of gastroesophageal reflux disease  She has however had heavy Menstrual periods causing fairly severe menorrhagia and dysmenorrhea for which she is following up with gynecology    Past Medical History:   Diagnosis Date    Anemia     HLD (hyperlipidemia)     Sarcoidosis        Past Surgical History:   Procedure Laterality Date    ANKLE SURGERY Right 2018    COLONOSCOPY  2018    GASTRECTOMY SLEEVE LAPAROSCOPIC  july 8th 2021    KNEE ARTHROSCOPY W/ MENISCAL REPAIR Left 2013    TUBAL LIGATION  1997    UPPER GASTROINTESTINAL ENDOSCOPY         No current outpatient medications on file  No current facility-administered medications for this visit  Allergies   Allergen Reactions    Penicillin G Hives       Review of Systems    Video Exam    Vitals:    01/12/22 1417   BP: 139/77   BP Location: Left arm   Patient Position: Sitting   Cuff Size: Standard   Pulse: 57   Temp: (!) 96 8 °F (36 °C)   TempSrc: Oral   SpO2: 99%   Weight: 62 6 kg (138 lb)       Physical Exam     Gen: NAD  Heent: atraumatic, normocephalic  Mouth: is moist  Neck: is supple, no JVD, no carotid bruits  Heart: is regular Normal s1, s2,  Lungs: are clear to auscultation  Abd: soft, non tender, NABS  Ext: no edema, distal pulses normal  Skin: no rashes, ulcers  Mood: normal affect  Neuro: AAOx3    I spent 25 minutes directly with the patient during this visit    Joanne Herrera [address] verbally agrees to participate in [employer]   Pt is aware that [facility] could be aylin drain was placed under the mastectomy flap. The incision was closed using an interrupted 3-0 vicryl deep dermal stitch followed by a running 4-0 monocryl subcuticular.      Dermabond was applied. A post surgical bra was placed on the patient. At the end of the operation, all sponge, instrument, and needle counts x 2 were correct.    ESTIMATED BLOOD LOSS: Minimal    COMPLICATIONS: none    DISPOSITION: PACU - hemodynamically stable.    ATTESTATION:   I performed the procedure.       limited without vital signs or the ability to perform a full hands-on physical exam  Bhavnashawn Irving Claus understands she or the provider may request at any time to terminate the video visit and request the patient to seek care or treatment in person

## 2022-02-02 ENCOUNTER — PATIENT MESSAGE (OUTPATIENT)
Dept: INTERNAL MEDICINE CLINIC | Facility: CLINIC | Age: 52
End: 2022-02-02

## 2022-02-02 DIAGNOSIS — D50.9 IRON DEFICIENCY ANEMIA, UNSPECIFIED IRON DEFICIENCY ANEMIA TYPE: Primary | ICD-10-CM

## 2022-02-04 NOTE — TELEPHONE ENCOUNTER
Scarlett Pena 2/2/2022 6:01 PM EST      ----- Message -----  From: Danae Ye  Sent: 2/2/2022 5:50 PM EST  To: [facility]  Subject: Elza Sahu can you refer me to a hematologist

## 2022-02-08 ENCOUNTER — TELEPHONE (OUTPATIENT)
Dept: HEMATOLOGY ONCOLOGY | Facility: CLINIC | Age: 52
End: 2022-02-08

## 2022-02-08 NOTE — TELEPHONE ENCOUNTER
Left detailed message on patient cell to return our call so we can schedule her consult with Dr Das

## 2022-02-10 ENCOUNTER — TELEPHONE (OUTPATIENT)
Dept: HEMATOLOGY ONCOLOGY | Facility: CLINIC | Age: 52
End: 2022-02-10

## 2022-02-10 NOTE — TELEPHONE ENCOUNTER
New Patient Intake Form   Patient Details:    Maame Paredes  1970  [id number]    Appointment Information   Who is calling to schedule? Patient   If not self, what is the caller's name? Please put name of RBC nurse as well  N/a   Referring provider Torsten Baumann MD   What is the diagnosis? Iron deficiency anemia, unspecified iron deficiency anemia type     Is there a confirmed tissue diagnosis? No   Is there a biopsy ordered or pending? Please specify dates   N/a     Is patient aware of diagnosis? Yes   Have you had any imaging or labs done? If yes, where? (If imaging done outside of [facility], please remind patient to bring a disk ) No     If imaging done at outside facility, did you instruct patient to obtain discs and bring to visit? No   Have you been seen by another Oncologist/Hematologist?  If so, who and where? No   Are the records in [facility] or Care Everywhere? Yes   Are records needed from an outside facility? No   If yes, Name of facility, city and state where facility is located  Did you instruct patient to have records faxed to [facility] and provide [facility] number? N/a   Preferred [facility]   Is the patient willing to be seen by another provider?   (This is for breast patients only)    Miscellaneous Information:    Patient was advised to complete lab orders in her chart  Appointment made for 2/24 at 11:20

## 2022-02-11 ENCOUNTER — TELEPHONE (OUTPATIENT)
Dept: HEMATOLOGY ONCOLOGY | Facility: CLINIC | Age: 52
End: 2022-02-11

## 2022-02-11 NOTE — TELEPHONE ENCOUNTER
[address] [place], [place]  Patient must complete iron panel labs ordered by PCP prior to seeing Proothi  Please call patient to advise  Left message for patient informing her to have lab completed prior to visit with Dr Dimple Ivory on 2/24/22

## 2022-02-23 ENCOUNTER — APPOINTMENT (OUTPATIENT)
Dept: LAB | Facility: HOSPITAL | Age: 52
End: 2022-02-23
Attending: INTERNAL MEDICINE
Payer: COMMERCIAL

## 2022-02-23 DIAGNOSIS — R00.2 PALPITATIONS: ICD-10-CM

## 2022-02-23 DIAGNOSIS — D50.9 IRON DEFICIENCY ANEMIA, UNSPECIFIED IRON DEFICIENCY ANEMIA TYPE: ICD-10-CM

## 2022-02-23 DIAGNOSIS — E55.9 VITAMIN D DEFICIENCY: ICD-10-CM

## 2022-02-23 DIAGNOSIS — E78.5 HYPERLIPIDEMIA, UNSPECIFIED HYPERLIPIDEMIA TYPE: ICD-10-CM

## 2022-02-23 DIAGNOSIS — F41.9 ANXIETY DISORDER, UNSPECIFIED TYPE: ICD-10-CM

## 2022-02-23 LAB
25(OH)D3 SERPL-MCNC: 21.8 NG/ML (ref 30–100)
ALBUMIN SERPL BCP-MCNC: 3.6 G/DL (ref 3.5–5)
ALP SERPL-CCNC: 67 U/L (ref 46–116)
ALT SERPL W P-5'-P-CCNC: 18 U/L (ref 12–78)
ANION GAP SERPL CALCULATED.3IONS-SCNC: 9 MMOL/L (ref 4–13)
AST SERPL W P-5'-P-CCNC: 12 U/L (ref 5–45)
BASOPHILS # BLD AUTO: 0.02 THOUSANDS/ΜL (ref 0–0.1)
BASOPHILS NFR BLD AUTO: 1 % (ref 0–1)
BILIRUB SERPL-MCNC: 0.36 MG/DL (ref 0.2–1)
BUN SERPL-MCNC: 14 MG/DL (ref 5–25)
CALCIUM SERPL-MCNC: 8.8 MG/DL (ref 8.3–10.1)
CHLORIDE SERPL-SCNC: 106 MMOL/L (ref 100–108)
CHOLEST SERPL-MCNC: 175 MG/DL
CO2 SERPL-SCNC: 27 MMOL/L (ref 21–32)
CREAT SERPL-MCNC: 0.86 MG/DL (ref 0.6–1.3)
EOSINOPHIL # BLD AUTO: 0.09 THOUSAND/ΜL (ref 0–0.61)
EOSINOPHIL NFR BLD AUTO: 2 % (ref 0–6)
ERYTHROCYTE [DISTWIDTH] IN BLOOD BY AUTOMATED COUNT: 18.6 % (ref 11.6–15.1)
FERRITIN SERPL-MCNC: 4 NG/ML (ref 8–388)
GFR SERPL CREATININE-BSD FRML MDRD: 77 ML/MIN/1.73SQ M
GLUCOSE P FAST SERPL-MCNC: 86 MG/DL (ref 65–99)
HCT VFR BLD AUTO: 34.3 % (ref 34.8–46.1)
HDLC SERPL-MCNC: 54 MG/DL
HGB BLD-MCNC: 10 G/DL (ref 11.5–15.4)
IMM GRANULOCYTES # BLD AUTO: 0.01 THOUSAND/UL (ref 0–0.2)
IMM GRANULOCYTES NFR BLD AUTO: 0 % (ref 0–2)
IRON SATN MFR SERPL: 6 % (ref 15–50)
IRON SERPL-MCNC: 25 UG/DL (ref 50–170)
LDLC SERPL CALC-MCNC: 110 MG/DL (ref 0–100)
LYMPHOCYTES # BLD AUTO: 1.55 THOUSANDS/ΜL (ref 0.6–4.47)
LYMPHOCYTES NFR BLD AUTO: 38 % (ref 14–44)
MCH RBC QN AUTO: 22.6 PG (ref 26.8–34.3)
MCHC RBC AUTO-ENTMCNC: 29.2 G/DL (ref 31.4–37.4)
MCV RBC AUTO: 78 FL (ref 82–98)
MONOCYTES # BLD AUTO: 0.25 THOUSAND/ΜL (ref 0.17–1.22)
MONOCYTES NFR BLD AUTO: 6 % (ref 4–12)
NEUTROPHILS # BLD AUTO: 2.13 THOUSANDS/ΜL (ref 1.85–7.62)
NEUTS SEG NFR BLD AUTO: 53 % (ref 43–75)
NRBC BLD AUTO-RTO: 0 /100 WBCS
PLATELET # BLD AUTO: 294 THOUSANDS/UL (ref 149–390)
PMV BLD AUTO: 12 FL (ref 8.9–12.7)
POTASSIUM SERPL-SCNC: 4.2 MMOL/L (ref 3.5–5.3)
PROT SERPL-MCNC: 7 G/DL (ref 6.4–8.2)
RBC # BLD AUTO: 4.42 MILLION/UL (ref 3.81–5.12)
SODIUM SERPL-SCNC: 142 MMOL/L (ref 136–145)
TIBC SERPL-MCNC: 444 UG/DL (ref 250–450)
TRIGL SERPL-MCNC: 54 MG/DL
WBC # BLD AUTO: 4.05 THOUSAND/UL (ref 4.31–10.16)

## 2022-02-23 PROCEDURE — 85025 COMPLETE CBC W/AUTO DIFF WBC: CPT

## 2022-02-23 PROCEDURE — 80053 COMPREHEN METABOLIC PANEL: CPT

## 2022-02-23 PROCEDURE — 82306 VITAMIN D 25 HYDROXY: CPT

## 2022-02-23 PROCEDURE — 83540 ASSAY OF IRON: CPT

## 2022-02-23 PROCEDURE — 80061 LIPID PANEL: CPT

## 2022-02-23 PROCEDURE — 82728 ASSAY OF FERRITIN: CPT

## 2022-02-23 PROCEDURE — 83550 IRON BINDING TEST: CPT

## 2022-02-23 PROCEDURE — 36415 COLL VENOUS BLD VENIPUNCTURE: CPT

## 2022-02-24 ENCOUNTER — CONSULT (OUTPATIENT)
Dept: HEMATOLOGY ONCOLOGY | Facility: CLINIC | Age: 52
End: 2022-02-24
Payer: COMMERCIAL

## 2022-02-24 VITALS
OXYGEN SATURATION: 100 % | RESPIRATION RATE: 16 BRPM | SYSTOLIC BLOOD PRESSURE: 120 MMHG | TEMPERATURE: 97.8 F | WEIGHT: 137 LBS | HEIGHT: 63 IN | BODY MASS INDEX: 24.27 KG/M2 | DIASTOLIC BLOOD PRESSURE: 60 MMHG | HEART RATE: 53 BPM

## 2022-02-24 DIAGNOSIS — D50.0 IRON DEFICIENCY ANEMIA SECONDARY TO BLOOD LOSS (CHRONIC): ICD-10-CM

## 2022-02-24 DIAGNOSIS — Z98.84 STATUS POST BARIATRIC SURGERY: ICD-10-CM

## 2022-02-24 DIAGNOSIS — K90.9 MALABSORPTION SYNDROME: ICD-10-CM

## 2022-02-24 DIAGNOSIS — D50.9 IRON DEFICIENCY ANEMIA, UNSPECIFIED IRON DEFICIENCY ANEMIA TYPE: Primary | ICD-10-CM

## 2022-02-24 PROCEDURE — 99214 OFFICE O/P EST MOD 30 MIN: CPT | Performed by: INTERNAL MEDICINE

## 2022-02-24 RX ORDER — SODIUM CHLORIDE 9 MG/ML
20 INJECTION, SOLUTION INTRAVENOUS ONCE
Status: CANCELLED | OUTPATIENT
Start: 2022-03-04

## 2022-02-24 NOTE — LETTER
February 25, 2022     Amira Lazaro, [address] [place] [address]    Patient: Edith Navarro   YOB: 1970   Date of Visit: 2/24/2022       Dear Dr Connie Kohli: Thank you for referring Edith Navarro to me for evaluation  Below are my notes for this consultation  If you have questions, please do not hesitate to call me  I look forward to following your patient along with you  Sincerely,        Phillip Cat MD        CC: No Recipients  Phillip Cat MD  2/25/2022  8:03 AM  Sign when Signing Visit  Consultation - Medical Oncology   Edith Navarro 45 y o  female MRN: [id number]  Unit/Bed#:  Encounter: [id number]  Date of consultation:  02/24/2022  Referring physician:  Dr Connie Kohli  Reason for Consult:  Iron deficiency anemia  HPI: Edith Navarro is a 45y o  year old female   Patient is here with her   She had gastric sleeve surgery 7 months ago in [facility]   She has lost 50 lb  She used to have longer menstrual periods and she states now she is going through change of life and menstrual periods are becoming less frequent  She feels weak and tired     No chest pain, shortness of breath or palpitations  No other bleeding  She states she had EGD and colonoscopy within 3 years  Oral iron gives her constipation  She also gives history of sarcoidosis and she used to be on prednisone and eyedrops but not anymore      ROS:  02/24/22 Reviewed 12 systems: See symptoms in HPI  Presently no other neurological, cardiac, pulmonary, GI and  symptoms other than listed in HPI  Other symptoms are in HPI  No  fever, chills,  bone pains, skin rash,  night sweats, arthritic symptoms,   numbness, claudication and gait problem  No frequent infections  Not unusually sensitive to heat or cold  No swelling of the ankles  No swollen glands  Patient is anxious         Historical Information   Past Medical History:   Diagnosis Date    Anemia     HLD (hyperlipidemia)     Sarcoidosis      Past Surgical History:   Procedure Laterality Date    ANKLE SURGERY Right 2018    COLONOSCOPY  2018    GASTRECTOMY SLEEVE LAPAROSCOPIC  2021    KNEE ARTHROSCOPY W/ MENISCAL REPAIR Left 2013    TUBAL LIGATION      UPPER GASTROINTESTINAL ENDOSCOPY       Social History   Social History     Substance and Sexual Activity   Alcohol Use Not Currently    Comment: socially     Social History     Substance and Sexual Activity   Drug Use Never     Social History     Tobacco Use   Smoking Status Former Smoker    Packs/day: 0 25    Years: 20 00    Pack years: 5 00    Types: Cigarettes    Quit date:     Years since quittin 1   Smokeless Tobacco Never Used     Family History:   Family History   Problem Relation Age of Onset    Dementia Mother     Seizures Mother     Hypertension Mother     Osteoporosis Mother     Glaucoma Mother     Macular degeneration Mother     Hearing loss Mother     Hyperlipidemia Mother     No Known Problems Sister     No Known Problems Daughter     No Known Problems Maternal Grandmother     No Known Problems Maternal Grandfather     No Known Problems Daughter     No Known Problems Maternal Aunt        No current outpatient medications on file      Allergies   Allergen Reactions    Penicillin G Hives     @ ROS@  Physical Exam:  Vitals:    22 1133 22 1141   BP: 120/60    BP Location: Left arm    Patient Position: Sitting    Cuff Size: Adult    Pulse: (!) 47 (!) 53   Resp: 16    Temp: 97 8 °F (36 6 °C)    TempSrc: Temporal    SpO2: 100%    Weight: 62 1 kg (137 lb)    Height: 5' 3" (1 6 m)      Alert, oriented, not in distress, no icterus, no oral thrush, no palpable neck mass, clear lung fields, regular heart rate, abdomen  soft and non tender, no palpable abdominal mass, no ascites, no edema of ankles, no calf tenderness, no focal neurological deficit, no skin rash, no palpable lymphadenopathy in the neck and axillary areas,  no clubbing  Patient is anxious  Performance status 1  Lab Results: I have reviewed all pertinent labs    LABS:  Results for orders placed or performed in visit on 02/23/22   Lipid Panel with Direct LDL reflex   Result Value Ref Range    Cholesterol 175 See Comment mg/dL    Triglycerides 54 See Comment mg/dL    HDL, Direct 54 >=50 mg/dL    LDL Calculated 110 (H) 0 - 100 mg/dL   Comprehensive metabolic panel   Result Value Ref Range    Sodium 142 136 - 145 mmol/L    Potassium 4 2 3 5 - 5 3 mmol/L    Chloride 106 100 - 108 mmol/L    CO2 27 21 - 32 mmol/L    ANION GAP 9 4 - 13 mmol/L    BUN 14 5 - 25 mg/dL    Creatinine 0 86 0 60 - 1 30 mg/dL    Glucose, Fasting 86 65 - 99 mg/dL    Calcium 8 8 8 3 - 10 1 mg/dL    AST 12 5 - 45 U/L    ALT 18 12 - 78 U/L    Alkaline Phosphatase 67 46 - 116 U/L    Total Protein 7 0 6 4 - 8 2 g/dL    Albumin 3 6 3 5 - 5 0 g/dL    Total Bilirubin 0 36 0 20 - 1 00 mg/dL    eGFR 77 ml/min/1 73sq m   CBC and differential   Result Value Ref Range    WBC 4 05 (L) 4 31 - 10 16 Thousand/uL    RBC 4 42 3 81 - 5 12 Million/uL    Hemoglobin 10 0 (L) 11 5 - 15 4 g/dL    Hematocrit 34 3 (L) 34 8 - 46 1 %    MCV 78 (L) 82 - 98 fL    MCH 22 6 (L) 26 8 - 34 3 pg    MCHC 29 2 (L) 31 4 - 37 4 g/dL    RDW 18 6 (H) 11 6 - 15 1 %    MPV 12 0 8 9 - 12 7 fL    Platelets 891 794 - 010 Thousands/uL    nRBC 0 /100 WBCs    Neutrophils Relative 53 43 - 75 %    Immat GRANS % 0 0 - 2 %    Lymphocytes Relative 38 14 - 44 %    Monocytes Relative 6 4 - 12 %    Eosinophils Relative 2 0 - 6 %    Basophils Relative 1 0 - 1 %    Neutrophils Absolute 2 13 1 85 - 7 62 Thousands/µL    Immature Grans Absolute 0 01 0 00 - 0 20 Thousand/uL    Lymphocytes Absolute 1 55 0 60 - 4 47 Thousands/µL    Monocytes Absolute 0 25 0 17 - 1 22 Thousand/µL    Eosinophils Absolute 0 09 0 00 - 0 61 Thousand/µL    Basophils Absolute 0 02 0 00 - 0 10 Thousands/µL   Vitamin D 25 hydroxy   Result Value Ref Range    Vit D, 25-Hydroxy 21 8 (L) 30 0 - 100 0 ng/mL   Iron Saturation %   Result Value Ref Range    Iron Saturation 6 (L) 15 - 50 %    TIBC 444 250 - 450 ug/dL    Iron 25 (L) 50 - 170 ug/dL   Ferritin   Result Value Ref Range    Ferritin 4 (L) 8 - 388 ng/mL         Imaging Studies: I have personally reviewed pertinent reports  Pathology, and Other Studies: I have personally reviewed pertinent reports  Assessment and Plan:  See diagnoses, orders instructions below  She had gastric sleeve surgery 7 months ago in [facility]   She has lost 50 lb  She used to have longer menstrual periods and she states now she is going through change of life and menstrual periods are becoming less frequent  She feels weak and tired     No chest pain, shortness of breath or palpitations  No other bleeding  She states she had EGD and colonoscopy within 3 years  Oral iron gives her constipation  She also gives history of sarcoidosis and she used to be on prednisone and eyedrops but not anymore      Physical examination and test results are as recorded and discussed  Patient has microcytic anemia of iron deficiency and she will go with the recommendation of intravenous iron infusion and arrangements are being made for that  She will probably need maintenance iron infusion later on also  Vitamin-D level is low and she will discuss with primary physician and in the meantime she will start taking 4000 units of vitamin D3 daily  There could be some absorption problem  Discussed the importance of self-breast examination, eating healthy foods, activities as tolerated and health screening test   Patient is capable of self-care  Goal is to correct her anemia  She will continue to follow with her primary physician and other consultants  Discussed precautions against coronavirus  See diagnoses, orders and instructions      1  Iron deficiency anemia, unspecified iron deficiency anemia type    - Ambulatory Referral to Hematology / Oncology  - CBC and differential; Future  - Reticulocytes; Future  - Ferritin; Future    2  Iron deficiency anemia secondary to blood loss (chronic)      3  Status post bariatric surgery      4  Malabsorption syndrome        Intravenous Venofer 200 mg once a week x7 weeks  Blood work in 8 weeks  Visit in 10 weeks  Please start next week     Patient will continue to follow with her primary physician and other consultants  Patient voiced understanding and agrees     This note has been generated by voice recognition softener  Therefore there maybe spelling, grammar, and/or syntax errors  Please contact if questions arise  Counseling / Coordination of Care    Dyan Mcintosh   Provided counseling and support

## 2022-02-24 NOTE — PROGRESS NOTES
Consultation - Medical Oncology   Danae Ye 45 y o  female MRN: [id number]  Unit/Bed#:  Encounter: [id number]  Date of consultation:  02/24/2022  Referring physician:  Dr Alonzo Sahu  Reason for Consult:  Iron deficiency anemia  HPI: Danae Ye is a 45y o  year old female   Patient is here with her   She had gastric sleeve surgery 7 months ago in [facility]   She has lost 50 lb  She used to have longer menstrual periods and she states now she is going through change of life and menstrual periods are becoming less frequent  She feels weak and tired     No chest pain, shortness of breath or palpitations  No other bleeding  She states she had EGD and colonoscopy within 3 years  Oral iron gives her constipation  She also gives history of sarcoidosis and she used to be on prednisone and eyedrops but not anymore      ROS:  02/24/22 Reviewed 12 systems: See symptoms in HPI  Presently no other neurological, cardiac, pulmonary, GI and  symptoms other than listed in HPI  Other symptoms are in HPI  No  fever, chills,  bone pains, skin rash,  night sweats, arthritic symptoms,   numbness, claudication and gait problem  No frequent infections  Not unusually sensitive to heat or cold  No swelling of the ankles  No swollen glands  Patient is anxious         Historical Information   Past Medical History:   Diagnosis Date    Anemia     HLD (hyperlipidemia)     Sarcoidosis      Past Surgical History:   Procedure Laterality Date    ANKLE SURGERY Right 2018    COLONOSCOPY  2018    GASTRECTOMY SLEEVE LAPAROSCOPIC  july 8th 2021    KNEE ARTHROSCOPY W/ MENISCAL REPAIR Left 2013    TUBAL LIGATION  1997    UPPER GASTROINTESTINAL ENDOSCOPY       Social History   Social History     Substance and Sexual Activity   Alcohol Use Not Currently    Comment: socially     Social History     Substance and Sexual Activity   Drug Use Never     Social History     Tobacco Use   Smoking Status Former Smoker    Packs/day: 0 25    Years: 20 00    Pack years: 5 00    Types: Cigarettes    Quit date:     Years since quittin 1   Smokeless Tobacco Never Used     Family History:   Family History   Problem Relation Age of Onset    Dementia Mother     Seizures Mother     Hypertension Mother     Osteoporosis Mother     Glaucoma Mother     Macular degeneration Mother     Hearing loss Mother     Hyperlipidemia Mother     No Known Problems Sister     No Known Problems Daughter     No Known Problems Maternal Grandmother     No Known Problems Maternal Grandfather     No Known Problems Daughter     No Known Problems Maternal Aunt        No current outpatient medications on file  Allergies   Allergen Reactions    Penicillin G Hives     @ ROS@  Physical Exam:  Vitals:    22 1133 22 1141   BP: 120/60    BP Location: Left arm    Patient Position: Sitting    Cuff Size: Adult    Pulse: (!) 47 (!) 53   Resp: 16    Temp: 97 8 °F (36 6 °C)    TempSrc: Temporal    SpO2: 100%    Weight: 62 1 kg (137 lb)    Height: 5' 3" (1 6 m)      Alert, oriented, not in distress, no icterus, no oral thrush, no palpable neck mass, clear lung fields, regular heart rate, abdomen  soft and non tender, no palpable abdominal mass, no ascites, no edema of ankles, no calf tenderness, no focal neurological deficit, no skin rash, no palpable lymphadenopathy in the neck and axillary areas,  no clubbing  Patient is anxious  Performance status 1  Lab Results: I have reviewed all pertinent labs    LABS:  Results for orders placed or performed in visit on 22   Lipid Panel with Direct LDL reflex   Result Value Ref Range    Cholesterol 175 See Comment mg/dL    Triglycerides 54 See Comment mg/dL    HDL, Direct 54 >=50 mg/dL    LDL Calculated 110 (H) 0 - 100 mg/dL   Comprehensive metabolic panel   Result Value Ref Range    Sodium 142 136 - 145 mmol/L    Potassium 4 2 3 5 - 5 3 mmol/L    Chloride 106 100 - 108 mmol/L    CO2 27 21 - 32 mmol/L    ANION GAP 9 4 - 13 mmol/L    BUN 14 5 - 25 mg/dL    Creatinine 0 86 0 60 - 1 30 mg/dL    Glucose, Fasting 86 65 - 99 mg/dL    Calcium 8 8 8 3 - 10 1 mg/dL    AST 12 5 - 45 U/L    ALT 18 12 - 78 U/L    Alkaline Phosphatase 67 46 - 116 U/L    Total Protein 7 0 6 4 - 8 2 g/dL    Albumin 3 6 3 5 - 5 0 g/dL    Total Bilirubin 0 36 0 20 - 1 00 mg/dL    eGFR 77 ml/min/1 73sq m   CBC and differential   Result Value Ref Range    WBC 4 05 (L) 4 31 - 10 16 Thousand/uL    RBC 4 42 3 81 - 5 12 Million/uL    Hemoglobin 10 0 (L) 11 5 - 15 4 g/dL    Hematocrit 34 3 (L) 34 8 - 46 1 %    MCV 78 (L) 82 - 98 fL    MCH 22 6 (L) 26 8 - 34 3 pg    MCHC 29 2 (L) 31 4 - 37 4 g/dL    RDW 18 6 (H) 11 6 - 15 1 %    MPV 12 0 8 9 - 12 7 fL    Platelets 357 247 - 446 Thousands/uL    nRBC 0 /100 WBCs    Neutrophils Relative 53 43 - 75 %    Immat GRANS % 0 0 - 2 %    Lymphocytes Relative 38 14 - 44 %    Monocytes Relative 6 4 - 12 %    Eosinophils Relative 2 0 - 6 %    Basophils Relative 1 0 - 1 %    Neutrophils Absolute 2 13 1 85 - 7 62 Thousands/µL    Immature Grans Absolute 0 01 0 00 - 0 20 Thousand/uL    Lymphocytes Absolute 1 55 0 60 - 4 47 Thousands/µL    Monocytes Absolute 0 25 0 17 - 1 22 Thousand/µL    Eosinophils Absolute 0 09 0 00 - 0 61 Thousand/µL    Basophils Absolute 0 02 0 00 - 0 10 Thousands/µL   Vitamin D 25 hydroxy   Result Value Ref Range    Vit D, 25-Hydroxy 21 8 (L) 30 0 - 100 0 ng/mL   Iron Saturation %   Result Value Ref Range    Iron Saturation 6 (L) 15 - 50 %    TIBC 444 250 - 450 ug/dL    Iron 25 (L) 50 - 170 ug/dL   Ferritin   Result Value Ref Range    Ferritin 4 (L) 8 - 388 ng/mL         Imaging Studies: I have personally reviewed pertinent reports  Pathology, and Other Studies: I have personally reviewed pertinent reports  Assessment and Plan:  See diagnoses, orders instructions below  She had gastric sleeve surgery 7 months ago in [place]   She has lost 50 lb    She used to have longer menstrual periods and she states now she is going through change of life and menstrual periods are becoming less frequent  She feels weak and tired     No chest pain, shortness of breath or palpitations  No other bleeding  She states she had EGD and colonoscopy within 3 years  Oral iron gives her constipation  She also gives history of sarcoidosis and she used to be on prednisone and eyedrops but not anymore      Physical examination and test results are as recorded and discussed  Patient has microcytic anemia of iron deficiency and she will go with the recommendation of intravenous iron infusion and arrangements are being made for that  She will probably need maintenance iron infusion later on also  Vitamin-D level is low and she will discuss with primary physician and in the meantime she will start taking 4000 units of vitamin D3 daily  There could be some absorption problem  Discussed the importance of self-breast examination, eating healthy foods, activities as tolerated and health screening test   Patient is capable of self-care  Goal is to correct her anemia  She will continue to follow with her primary physician and other consultants  Discussed precautions against coronavirus  See diagnoses, orders and instructions  1  Iron deficiency anemia, unspecified iron deficiency anemia type    - Ambulatory Referral to Hematology / Oncology  - CBC and differential; Future  - Reticulocytes; Future  - Ferritin; Future    2  Iron deficiency anemia secondary to blood loss (chronic)      3  Status post bariatric surgery      4  Malabsorption syndrome        Intravenous Venofer 200 mg once a week x7 weeks  Blood work in 8 weeks  Visit in 10 weeks  Please start next week     Patient will continue to follow with her primary physician and other consultants  Patient voiced understanding and agrees     This note has been generated by voice recognition softener    Therefore there maybe spelling, grammar, and/or syntax errors  Please contact if questions arise  Counseling / Coordination of Care    Prisca Vickers   Provided counseling and support

## 2022-02-24 NOTE — PATIENT INSTRUCTIONS
Intravenous Venofer 200 mg once a week x7 weeks  Blood work in 8 weeks  Visit in 10 weeks    Please start next week

## 2022-02-25 PROBLEM — K90.9 MALABSORPTION SYNDROME: Status: ACTIVE | Noted: 2022-02-25

## 2022-02-25 PROBLEM — Z98.84 STATUS POST BARIATRIC SURGERY: Status: ACTIVE | Noted: 2022-02-25

## 2022-02-25 PROBLEM — D50.0 IRON DEFICIENCY ANEMIA SECONDARY TO BLOOD LOSS (CHRONIC): Status: ACTIVE | Noted: 2022-02-25

## 2022-02-28 ENCOUNTER — PATIENT MESSAGE (OUTPATIENT)
Dept: INTERNAL MEDICINE CLINIC | Facility: CLINIC | Age: 52
End: 2022-02-28

## 2022-02-28 DIAGNOSIS — K90.9 MALABSORPTION SYNDROME: Primary | ICD-10-CM

## 2022-02-28 DIAGNOSIS — D50.9 IRON DEFICIENCY ANEMIA, UNSPECIFIED IRON DEFICIENCY ANEMIA TYPE: ICD-10-CM

## 2022-03-04 ENCOUNTER — HOSPITAL ENCOUNTER (OUTPATIENT)
Dept: INFUSION CENTER | Facility: HOSPITAL | Age: 52
Discharge: HOME | End: 2022-03-04
Attending: INTERNAL MEDICINE
Payer: COMMERCIAL

## 2022-03-04 VITALS
TEMPERATURE: 97.7 F | DIASTOLIC BLOOD PRESSURE: 68 MMHG | SYSTOLIC BLOOD PRESSURE: 127 MMHG | RESPIRATION RATE: 16 BRPM | HEART RATE: 53 BPM

## 2022-03-04 DIAGNOSIS — D50.9 IRON DEFICIENCY ANEMIA, UNSPECIFIED IRON DEFICIENCY ANEMIA TYPE: Primary | ICD-10-CM

## 2022-03-04 PROCEDURE — 96365 THER/PROPH/DIAG IV INF INIT: CPT

## 2022-03-04 RX ORDER — SODIUM CHLORIDE 9 MG/ML
20 INJECTION, SOLUTION INTRAVENOUS ONCE
Status: COMPLETED | OUTPATIENT
Start: 2022-03-04 | End: 2022-03-04

## 2022-03-04 RX ORDER — SODIUM CHLORIDE 9 MG/ML
20 INJECTION, SOLUTION INTRAVENOUS ONCE
Status: CANCELLED | OUTPATIENT
Start: 2022-03-11

## 2022-03-04 RX ADMIN — IRON SUCROSE 200 MG: 20 INJECTION, SOLUTION INTRAVENOUS at 10:15

## 2022-03-04 RX ADMIN — SODIUM CHLORIDE 20 ML/HR: 0.9 INJECTION, SOLUTION INTRAVENOUS at 10:12

## 2022-03-10 ENCOUNTER — OFFICE VISIT (OUTPATIENT)
Dept: PODIATRY | Facility: CLINIC | Age: 52
End: 2022-03-10
Payer: COMMERCIAL

## 2022-03-10 VITALS
WEIGHT: 137 LBS | DIASTOLIC BLOOD PRESSURE: 76 MMHG | HEART RATE: 53 BPM | HEIGHT: 63 IN | SYSTOLIC BLOOD PRESSURE: 132 MMHG | BODY MASS INDEX: 24.27 KG/M2

## 2022-03-10 DIAGNOSIS — L85.1 ACQUIRED KERATODERMA: Primary | ICD-10-CM

## 2022-03-10 DIAGNOSIS — L98.9 DISORDER OF SKIN AND SUBCUTANEOUS TISSUE: ICD-10-CM

## 2022-03-10 PROCEDURE — 99202 OFFICE O/P NEW SF 15 MIN: CPT | Performed by: PODIATRIST

## 2022-03-10 NOTE — PROGRESS NOTES
PATIENT:  Rosaura Mayers  1970       ASSESSMENT:     1  Acquired keratoderma     2  Disorder of skin and subcutaneous tissue             PLAN:  1  Patient was counseled and educated on the condition and the diagnosis  2  The diagnosis, treatment options and prognosis were discussed with the patient  3  She has punctate keratosis on right plantar foot  Discussed options and the patient wished to proceed with chemical cauterization  Verbal consent was obtained from the patient  All hyperkeratotic skin lesions were debrided using a sterile #15 scapel  The lesions were then cauterized with Cantharidin  An occlusive dressing was applied to the areas  Instructed to remove the dressing in the morning  Instruction was given for possible local care  The patient tolerated the procedure well and without complications  4  Instructed pads and proper footwear for right 5th toe   5  Patient will return in 3 weeks for re-evaluation  Subjective:       HPI  The patient presents with chief complaint of right foot pain  She has sharp pain on right plantar foot for about 2 months  She noticed hard skin  She tried OTC acid without resolving it  She also has a corn on right 5th toe  No redness or edema  No drainage  She does not recall any skin injury  The following portions of the patient's history were reviewed and updated as appropriate: allergies, current medications, past family history, past medical history, past social history, past surgical history and problem list   All pertinent labs and images were reviewed        Past Medical History  Past Medical History:   Diagnosis Date    Anemia     HLD (hyperlipidemia)     Sarcoidosis        Past Surgical History  Past Surgical History:   Procedure Laterality Date    ANKLE SURGERY Right 2018    COLONOSCOPY  2018    GASTRECTOMY SLEEVE LAPAROSCOPIC  july 8th 2021    KNEE ARTHROSCOPY W/ MENISCAL REPAIR Left 2013    TUBAL LIGATION     UPPER GASTROINTESTINAL ENDOSCOPY          Allergies:  Penicillin g    Medications:  No current outpatient medications on file  No current facility-administered medications for this visit  Social History:  Social History     Socioeconomic History    Marital status: Registered Domestic Partner     Spouse name: None    Number of children: None    Years of education: None    Highest education level: None   Occupational History    None   Tobacco Use    Smoking status: Former Smoker     Packs/day: 0 25     Years: 20 00     Pack years: 5 00     Types: Cigarettes     Quit date:      Years since quittin 1    Smokeless tobacco: Never Used   Vaping Use    Vaping Use: Never used   Substance and Sexual Activity    Alcohol use: Not Currently     Comment: socially    Drug use: Never    Sexual activity: Not Currently   Other Topics Concern    None   Social History Narrative    None     Social Determinants of Health     Financial Resource Strain: Not on file   Food Insecurity: Not on file   Transportation Needs: Not on file   Physical Activity: Not on file   Stress: Not on file   Social Connections: Not on file   Intimate Partner Violence: Not on file   Housing Stability: Not on file          Review of Systems   Constitutional: Negative for appetite change, chills and fever  Respiratory: Negative  Cardiovascular: Negative  Gastrointestinal: Negative  Musculoskeletal: Negative for gait problem  Skin: Negative for wound  Neurological: Negative for weakness and numbness  Hematological: Negative  Psychiatric/Behavioral: Negative for behavioral problems and confusion  Objective:      /76   Pulse (!) 53   Ht 5' 3" (1 6 m) Comment: verbal  Wt 62 1 kg (137 lb)   BMI 24 27 kg/m²          Physical Exam  Vitals reviewed  Constitutional:       General: She is not in acute distress  Appearance: Normal appearance  She is not toxic-appearing     HENT:      Head: Normocephalic and atraumatic  Cardiovascular:      Rate and Rhythm: Normal rate and regular rhythm  Pulses: Normal pulses  Dorsalis pedis pulses are 2+ on the right side and 2+ on the left side  Posterior tibial pulses are 2+ on the right side and 2+ on the left side  Pulmonary:      Effort: Pulmonary effort is normal  No respiratory distress  Musculoskeletal:         General: No swelling, tenderness or signs of injury  Normal range of motion  Cervical back: Normal range of motion and neck supple  Right lower leg: No edema  Left lower leg: No edema  Right foot: No foot drop  Left foot: No foot drop  Skin:     General: Skin is warm  Capillary Refill: Capillary refill takes less than 2 seconds  Coloration: Skin is not cyanotic or mottled  Findings: No abscess, ecchymosis, erythema, rash or wound  Nails: There is no clubbing  Comments: Punctate keratosis with surrounding callus on right 2nd intermetatarsal area  Keratosis right 5th toe  Neurological:      General: No focal deficit present  Mental Status: She is alert and oriented to person, place, and time  Cranial Nerves: No cranial nerve deficit  Sensory: No sensory deficit  Motor: No weakness  Coordination: Coordination normal    Psychiatric:         Mood and Affect: Mood normal          Behavior: Behavior normal          Thought Content:  Thought content normal          Judgment: Judgment normal

## 2022-03-11 ENCOUNTER — HOSPITAL ENCOUNTER (OUTPATIENT)
Dept: INFUSION CENTER | Facility: HOSPITAL | Age: 52
Discharge: HOME | End: 2022-03-11
Attending: INTERNAL MEDICINE
Payer: COMMERCIAL

## 2022-03-11 VITALS
SYSTOLIC BLOOD PRESSURE: 128 MMHG | DIASTOLIC BLOOD PRESSURE: 60 MMHG | TEMPERATURE: 97.8 F | HEART RATE: 63 BPM | RESPIRATION RATE: 16 BRPM

## 2022-03-11 DIAGNOSIS — D50.9 IRON DEFICIENCY ANEMIA, UNSPECIFIED IRON DEFICIENCY ANEMIA TYPE: Primary | ICD-10-CM

## 2022-03-11 PROCEDURE — 96365 THER/PROPH/DIAG IV INF INIT: CPT

## 2022-03-11 RX ORDER — SODIUM CHLORIDE 9 MG/ML
20 INJECTION, SOLUTION INTRAVENOUS ONCE
Status: CANCELLED | OUTPATIENT
Start: 2022-03-18

## 2022-03-11 RX ORDER — SODIUM CHLORIDE 9 MG/ML
20 INJECTION, SOLUTION INTRAVENOUS ONCE
Status: COMPLETED | OUTPATIENT
Start: 2022-03-11 | End: 2022-03-11

## 2022-03-11 RX ADMIN — IRON SUCROSE 200 MG: 20 INJECTION, SOLUTION INTRAVENOUS at 11:43

## 2022-03-11 RX ADMIN — SODIUM CHLORIDE 20 ML/HR: 0.9 INJECTION, SOLUTION INTRAVENOUS at 11:43

## 2022-03-11 NOTE — PROGRESS NOTES
Pt received IV venofer infusion without incident   April 15th appointment time adjusted per pt's request  Declines AVS

## 2022-03-18 ENCOUNTER — HOSPITAL ENCOUNTER (OUTPATIENT)
Dept: INFUSION CENTER | Facility: HOSPITAL | Age: 52
Discharge: HOME | End: 2022-03-18
Attending: INTERNAL MEDICINE
Payer: COMMERCIAL

## 2022-03-18 VITALS
TEMPERATURE: 97.8 F | DIASTOLIC BLOOD PRESSURE: 62 MMHG | RESPIRATION RATE: 18 BRPM | HEART RATE: 62 BPM | SYSTOLIC BLOOD PRESSURE: 116 MMHG

## 2022-03-18 DIAGNOSIS — D50.9 IRON DEFICIENCY ANEMIA, UNSPECIFIED IRON DEFICIENCY ANEMIA TYPE: Primary | ICD-10-CM

## 2022-03-18 PROCEDURE — 96365 THER/PROPH/DIAG IV INF INIT: CPT

## 2022-03-18 RX ORDER — SODIUM CHLORIDE 9 MG/ML
20 INJECTION, SOLUTION INTRAVENOUS ONCE
Status: CANCELLED | OUTPATIENT
Start: 2022-03-25

## 2022-03-18 RX ORDER — SODIUM CHLORIDE 9 MG/ML
20 INJECTION, SOLUTION INTRAVENOUS ONCE
Status: COMPLETED | OUTPATIENT
Start: 2022-03-18 | End: 2022-03-18

## 2022-03-18 RX ADMIN — SODIUM CHLORIDE 20 ML/HR: 9 INJECTION, SOLUTION INTRAVENOUS at 14:22

## 2022-03-18 RX ADMIN — IRON SUCROSE 200 MG: 20 INJECTION, SOLUTION INTRAVENOUS at 14:22

## 2022-03-25 ENCOUNTER — HOSPITAL ENCOUNTER (OUTPATIENT)
Dept: INFUSION CENTER | Facility: HOSPITAL | Age: 52
Discharge: HOME | End: 2022-03-25
Attending: INTERNAL MEDICINE
Payer: COMMERCIAL

## 2022-03-25 VITALS
TEMPERATURE: 97.1 F | RESPIRATION RATE: 18 BRPM | SYSTOLIC BLOOD PRESSURE: 112 MMHG | HEART RATE: 52 BPM | DIASTOLIC BLOOD PRESSURE: 62 MMHG

## 2022-03-25 DIAGNOSIS — D50.9 IRON DEFICIENCY ANEMIA, UNSPECIFIED IRON DEFICIENCY ANEMIA TYPE: Primary | ICD-10-CM

## 2022-03-25 PROCEDURE — 96365 THER/PROPH/DIAG IV INF INIT: CPT

## 2022-03-25 RX ORDER — SODIUM CHLORIDE 9 MG/ML
20 INJECTION, SOLUTION INTRAVENOUS ONCE
Status: CANCELLED | OUTPATIENT
Start: 2022-04-01

## 2022-03-25 RX ORDER — SODIUM CHLORIDE 9 MG/ML
20 INJECTION, SOLUTION INTRAVENOUS ONCE
Status: COMPLETED | OUTPATIENT
Start: 2022-03-25 | End: 2022-03-25

## 2022-03-25 RX ADMIN — SODIUM CHLORIDE 20 ML/HR: 9 INJECTION, SOLUTION INTRAVENOUS at 11:20

## 2022-03-25 RX ADMIN — IRON SUCROSE 200 MG: 20 INJECTION, SOLUTION INTRAVENOUS at 11:20

## 2022-03-31 ENCOUNTER — OFFICE VISIT (OUTPATIENT)
Dept: PODIATRY | Facility: CLINIC | Age: 52
End: 2022-03-31
Payer: COMMERCIAL

## 2022-03-31 VITALS
DIASTOLIC BLOOD PRESSURE: 71 MMHG | HEART RATE: 54 BPM | SYSTOLIC BLOOD PRESSURE: 108 MMHG | HEIGHT: 63 IN | BODY MASS INDEX: 24.27 KG/M2

## 2022-03-31 DIAGNOSIS — L98.9 DISORDER OF SKIN AND SUBCUTANEOUS TISSUE: Primary | ICD-10-CM

## 2022-03-31 PROCEDURE — 99213 OFFICE O/P EST LOW 20 MIN: CPT | Performed by: PODIATRIST

## 2022-03-31 RX ORDER — CHOLECALCIFEROL (VITAMIN D3) 125 MCG
CAPSULE ORAL
COMMUNITY
Start: 2022-03-01

## 2022-03-31 RX ORDER — ACETAMINOPHEN 500 MG
TABLET ORAL
COMMUNITY
Start: 2022-03-01

## 2022-03-31 RX ORDER — FOLIC ACID 1 MG/1
TABLET ORAL
COMMUNITY
Start: 2022-03-01

## 2022-03-31 NOTE — PROGRESS NOTES
PATIENT:  Liz Vee  1970       ASSESSMENT:     1  Disorder of skin and subcutaneous tissue             PLAN:  1  Reviewed the last office note  Patient was counseled and educated on the condition and the diagnosis  2  Continue chemical cauterization  Verbal consent was obtained from the patient  All hyperkeratotic skin lesions were debrided using a sterile #15 scapel  The lesions were then cauterized with Cantharidin  An occlusive dressing was applied to the areas  Instructed to remove the dressing in the morning  Instruction was given for possible local care  The patient tolerated the procedure well and without complications  3  Patient will return in 3 weeks for re-evaluation  Subjective:       HPI  The patient presents for right foot skin lesion  Her pain is much better now  No bleeding  No redness or edema  It was bothering her for 24 hours after the first treatment  The following portions of the patient's history were reviewed and updated as appropriate: allergies, current medications, past family history, past medical history, past social history, past surgical history and problem list   All pertinent labs and images were reviewed        Past Medical History  Past Medical History:   Diagnosis Date    Anemia     HLD (hyperlipidemia)     Sarcoidosis        Past Surgical History  Past Surgical History:   Procedure Laterality Date    ANKLE SURGERY Right 2018    COLONOSCOPY  2018    GASTRECTOMY SLEEVE LAPAROSCOPIC  july 8th 2021    KNEE ARTHROSCOPY W/ MENISCAL REPAIR Left 2013    TUBAL LIGATION  1997    UPPER GASTROINTESTINAL ENDOSCOPY          Allergies:  Penicillin g    Medications:  Current Outpatient Medications   Medication Sig Dispense Refill    Calcium Carbonate-Vit D-Min (Calcium 1200) 7554-7058 MG-UNIT CHEW       Cholecalciferol (D3 High Potency) 50 MCG (2000 UT) CAPS       folic acid (FOLVITE) 1 mg tablet        No current facility-administered medications for this visit  Social History:  Social History     Socioeconomic History    Marital status: Registered Domestic Partner     Spouse name: None    Number of children: None    Years of education: None    Highest education level: None   Occupational History    None   Tobacco Use    Smoking status: Former Smoker     Packs/day: 0 25     Years: 20 00     Pack years: 5 00     Types: Cigarettes     Quit date:      Years since quittin 2    Smokeless tobacco: Never Used   Vaping Use    Vaping Use: Never used   Substance and Sexual Activity    Alcohol use: Not Currently     Comment: socially    Drug use: Never    Sexual activity: Not Currently   Other Topics Concern    None   Social History Narrative    None     Social Determinants of Health     Financial Resource Strain: Not on file   Food Insecurity: Not on file   Transportation Needs: Not on file   Physical Activity: Not on file   Stress: Not on file   Social Connections: Not on file   Intimate Partner Violence: Not on file   Housing Stability: Not on file          Review of Systems   Constitutional: Negative for appetite change, chills and fever  Respiratory: Negative  Cardiovascular: Negative  Gastrointestinal: Negative  Musculoskeletal: Negative for gait problem  Skin: Negative for wound  Objective:      /71   Pulse (!) 54   Ht 5' 3" (1 6 m) Comment: verbal  BMI 24 27 kg/m²          Physical Exam  Vitals reviewed  Constitutional:       General: She is not in acute distress  Appearance: Normal appearance  She is not toxic-appearing  Cardiovascular:      Rate and Rhythm: Normal rate and regular rhythm  Pulses: Normal pulses  Dorsalis pedis pulses are 2+ on the right side and 2+ on the left side  Posterior tibial pulses are 2+ on the right side and 2+ on the left side  Pulmonary:      Effort: Pulmonary effort is normal  No respiratory distress  Musculoskeletal:         General: No swelling, tenderness or signs of injury  Normal range of motion  Right lower leg: No edema  Left lower leg: No edema  Right foot: No foot drop  Left foot: No foot drop  Skin:     General: Skin is warm  Capillary Refill: Capillary refill takes less than 2 seconds  Coloration: Skin is not cyanotic or mottled  Findings: No abscess, ecchymosis, erythema, rash or wound  Nails: There is no clubbing  Comments: Significant reduction of keratosis on right 2nd intermetatarsal area  Neurological:      General: No focal deficit present  Mental Status: She is alert and oriented to person, place, and time  Cranial Nerves: No cranial nerve deficit  Sensory: No sensory deficit  Motor: No weakness  Coordination: Coordination normal    Psychiatric:         Mood and Affect: Mood normal          Behavior: Behavior normal          Thought Content:  Thought content normal          Judgment: Judgment normal

## 2022-04-01 ENCOUNTER — HOSPITAL ENCOUNTER (OUTPATIENT)
Dept: INFUSION CENTER | Facility: HOSPITAL | Age: 52
Discharge: HOME | End: 2022-04-01
Attending: INTERNAL MEDICINE
Payer: COMMERCIAL

## 2022-04-01 VITALS
HEART RATE: 50 BPM | TEMPERATURE: 97.5 F | DIASTOLIC BLOOD PRESSURE: 67 MMHG | SYSTOLIC BLOOD PRESSURE: 122 MMHG | RESPIRATION RATE: 16 BRPM

## 2022-04-01 DIAGNOSIS — D50.9 IRON DEFICIENCY ANEMIA, UNSPECIFIED IRON DEFICIENCY ANEMIA TYPE: Primary | ICD-10-CM

## 2022-04-01 PROCEDURE — 96365 THER/PROPH/DIAG IV INF INIT: CPT

## 2022-04-01 RX ORDER — SODIUM CHLORIDE 9 MG/ML
20 INJECTION, SOLUTION INTRAVENOUS ONCE
Status: CANCELLED | OUTPATIENT
Start: 2022-04-08

## 2022-04-01 RX ORDER — SODIUM CHLORIDE 9 MG/ML
20 INJECTION, SOLUTION INTRAVENOUS ONCE
Status: COMPLETED | OUTPATIENT
Start: 2022-04-01 | End: 2022-04-01

## 2022-04-01 RX ADMIN — SODIUM CHLORIDE 20 ML/HR: 0.9 INJECTION, SOLUTION INTRAVENOUS at 14:10

## 2022-04-01 RX ADMIN — IRON SUCROSE 200 MG: 20 INJECTION, SOLUTION INTRAVENOUS at 14:10

## 2022-04-08 ENCOUNTER — HOSPITAL ENCOUNTER (OUTPATIENT)
Dept: INFUSION CENTER | Facility: HOSPITAL | Age: 52
Discharge: HOME | End: 2022-04-08
Attending: INTERNAL MEDICINE
Payer: COMMERCIAL

## 2022-04-08 VITALS
SYSTOLIC BLOOD PRESSURE: 112 MMHG | DIASTOLIC BLOOD PRESSURE: 62 MMHG | HEART RATE: 72 BPM | RESPIRATION RATE: 18 BRPM | TEMPERATURE: 97 F

## 2022-04-08 DIAGNOSIS — D50.9 IRON DEFICIENCY ANEMIA, UNSPECIFIED IRON DEFICIENCY ANEMIA TYPE: Primary | ICD-10-CM

## 2022-04-08 PROCEDURE — 96365 THER/PROPH/DIAG IV INF INIT: CPT

## 2022-04-08 RX ORDER — SODIUM CHLORIDE 9 MG/ML
20 INJECTION, SOLUTION INTRAVENOUS ONCE
Status: COMPLETED | OUTPATIENT
Start: 2022-04-08 | End: 2022-04-08

## 2022-04-08 RX ORDER — SODIUM CHLORIDE 9 MG/ML
20 INJECTION, SOLUTION INTRAVENOUS ONCE
Status: CANCELLED | OUTPATIENT
Start: 2022-04-15

## 2022-04-08 RX ADMIN — SODIUM CHLORIDE 20 ML/HR: 9 INJECTION, SOLUTION INTRAVENOUS at 11:27

## 2022-04-08 RX ADMIN — IRON SUCROSE 200 MG: 20 INJECTION, SOLUTION INTRAVENOUS at 11:27

## 2022-04-12 ENCOUNTER — OFFICE VISIT (OUTPATIENT)
Dept: INTERNAL MEDICINE CLINIC | Facility: CLINIC | Age: 52
End: 2022-04-12
Payer: COMMERCIAL

## 2022-04-12 ENCOUNTER — HOSPITAL ENCOUNTER (OUTPATIENT)
Dept: MAMMOGRAPHY | Facility: CLINIC | Age: 52
Discharge: HOME | End: 2022-04-12
Payer: COMMERCIAL

## 2022-04-12 VITALS
OXYGEN SATURATION: 99 % | HEIGHT: 63 IN | BODY MASS INDEX: 24.63 KG/M2 | HEART RATE: 47 BPM | DIASTOLIC BLOOD PRESSURE: 64 MMHG | TEMPERATURE: 96.5 F | SYSTOLIC BLOOD PRESSURE: 118 MMHG | WEIGHT: 139 LBS

## 2022-04-12 VITALS — BODY MASS INDEX: 24.27 KG/M2 | WEIGHT: 137 LBS | HEIGHT: 63 IN

## 2022-04-12 DIAGNOSIS — F41.9 ANXIETY DISORDER, UNSPECIFIED TYPE: ICD-10-CM

## 2022-04-12 DIAGNOSIS — Z09 FOLLOW-UP EXAM, 3-6 MONTHS SINCE PREVIOUS EXAM: ICD-10-CM

## 2022-04-12 DIAGNOSIS — Z98.84 STATUS POST BARIATRIC SURGERY: Primary | ICD-10-CM

## 2022-04-12 DIAGNOSIS — Z00.00 ANNUAL PHYSICAL EXAM: ICD-10-CM

## 2022-04-12 PROBLEM — E78.49 OTHER HYPERLIPIDEMIA: Status: RESOLVED | Noted: 2022-01-12 | Resolved: 2022-04-12

## 2022-04-12 PROBLEM — R00.2 PALPITATIONS: Status: RESOLVED | Noted: 2021-02-17 | Resolved: 2022-04-12

## 2022-04-12 PROBLEM — R45.86 EMOTIONAL LABILITY: Status: RESOLVED | Noted: 2021-02-17 | Resolved: 2022-04-12

## 2022-04-12 PROBLEM — R07.89 OTHER CHEST PAIN: Status: RESOLVED | Noted: 2021-04-02 | Resolved: 2022-04-12

## 2022-04-12 PROCEDURE — 99396 PREV VISIT EST AGE 40-64: CPT | Performed by: INTERNAL MEDICINE

## 2022-04-12 PROCEDURE — 76642 ULTRASOUND BREAST LIMITED: CPT

## 2022-04-12 PROCEDURE — G0279 TOMOSYNTHESIS, MAMMO: HCPCS

## 2022-04-12 PROCEDURE — 77066 DX MAMMO INCL CAD BI: CPT

## 2022-04-12 NOTE — ASSESSMENT & PLAN NOTE
No problems since bypass surgery  She has lost a total of 50 lb over the last year and is doing well with her diet and exercise which she does regularly    Continue present management

## 2022-04-12 NOTE — ASSESSMENT & PLAN NOTE
Continue with iron transfusions as recommended    Follow-up with hematology for long-term and management

## 2022-04-12 NOTE — PROGRESS NOTES
ADULT ANNUAL [address] [facility] PRIMARY CARE [place]    NAME: Aram Dobbins  AGE: 45 y o  SEX: female  : 1970     DATE: 2022     Assessment and Plan:     Problem List Items Addressed This Visit        Other    Anxiety disorder     Resolved         Status post bariatric surgery - Primary     No problems since bypass surgery  She has lost a total of 50 lb over the last year and is doing well with her diet and exercise which she does regularly  Continue present management               Immunizations and preventive care screenings were discussed with patient today  Appropriate education was printed on patient's after visit summary  Counseling:  Alcohol/drug use: discussed moderation in alcohol intake, the recommendations for healthy alcohol use, and avoidance of illicit drug use  Dental Health: discussed importance of regular tooth brushing, flossing, and dental visits  Injury prevention: discussed safety/seat belts, safety helmets, smoke detectors, carbon dioxide detectors, and smoking near bedding or upholstery  Sexual health: discussed sexually transmitted diseases, partner selection, use of condoms, avoidance of unintended pregnancy, and contraceptive alternatives  · Exercise: the importance of regular exercise/physical activity was discussed  Recommend exercise 3-5 times per week for at least 30 minutes  No follow-ups on file  Chief Complaint:     Chief Complaint   Patient presents with    Physical Exam     annual physical 3 month f/u labs done    [address] done today      History of Present Illness:     Adult Annual Physical   Patient here for a comprehensive physical exam  The patient reports problems - Previous history of hypercholesterolemia, heart palpitations, GERD an iron deficiency anemia  She also has remote history of sarcoidosis    S/P gastric bypass May 2021 in [facility]   She has done really well since her surgery  Diet is well balanced  She eats 3 meals a day in the right portion, and is diligent with her portion control and fluid intake  She has a history of iron deficiency anemia for which she follows up with Dr Sheridan Rocha  She also has a history of her previous right breast cyst which is being followed up periodically  She also has a history of complex right adnexal cyst that was imaged about a year ago             Diet and Physical Activity  · Diet/Nutrition: well balanced diet  · Exercise: walking and 5-7 times a week on average  Depression Screening  PHQ-2/9 Depression Screening         General Health  · Sleep: sleeps well  · Hearing: normal - bilateral   · Vision: no vision problems, goes for regular eye exams and wears glasses  · Dental: regular dental visits  /GYN Health  · Patient is: perimenopausal  · Last menstrual period: 2022  · Contraceptive method: Prior tubal ligation       Review of Systems:     Review of Systems   Past Medical History:     Past Medical History:   Diagnosis Date    Anemia     HLD (hyperlipidemia)     Other hyperlipidemia 2022    Palpitations 2021    Sarcoidosis       Past Surgical History:     Past Surgical History:   Procedure Laterality Date    ANKLE SURGERY Right 2018    COLONOSCOPY  2018    GASTRECTOMY SLEEVE LAPAROSCOPIC  2021    KNEE ARTHROSCOPY W/ MENISCAL REPAIR Left 2013    TUBAL LIGATION      UPPER GASTROINTESTINAL ENDOSCOPY        Social History:     Social History     Socioeconomic History    Marital status: Registered Domestic Partner     Spouse name: None    Number of children: None    Years of education: None    Highest education level: None   Occupational History    None   Tobacco Use    Smoking status: Former Smoker     Packs/day: 0 25     Years: 20 00     Pack years: 5 00     Types: Cigarettes     Quit date:      Years since quittin 3    Smokeless tobacco: Never Used Vaping Use    Vaping Use: Never used   Substance and Sexual Activity    Alcohol use: Not Currently    Drug use: Never    Sexual activity: Not Currently   Other Topics Concern    None   Social History Narrative    None     Social Determinants of Health     Financial Resource Strain: Not on file   Food Insecurity: Not on file   Transportation Needs: Not on file   Physical Activity: Not on file   Stress: Not on file   Social Connections: Not on file   Intimate Partner Violence: Not on file   Housing Stability: Not on file      Family History:     Family History   Problem Relation Age of Onset    Dementia Mother     Seizures Mother     Hypertension Mother     Osteoporosis Mother     Glaucoma Mother     Macular degeneration Mother     Hearing loss Mother     Hyperlipidemia Mother     No Known Problems Sister     No Known Problems Daughter     No Known Problems Maternal Grandmother     No Known Problems Maternal Grandfather     No Known Problems Daughter     No Known Problems Maternal Aunt     No Known Problems Father     No Known Problems Paternal Grandmother     No Known Problems Paternal Grandfather       Current Medications:     Current Outpatient Medications   Medication Sig Dispense Refill    Calcium Carbonate-Vit D-Min (Calcium 1200) 0610-5126 MG-UNIT CHEW       Cholecalciferol (D3 High Potency) 50 MCG (2000 UT) CAPS       folic acid (FOLVITE) 1 mg tablet        No current facility-administered medications for this visit  Allergies: Allergies   Allergen Reactions    Penicillin G Hives      Physical Exam:     /64 (BP Location: Left arm, Patient Position: Sitting, Cuff Size: Standard)   Pulse (!) 47   Temp (!) 96 5 °F (35 8 °C) (Tympanic)   Ht 5' 3" (1 6 m)   Wt 63 kg (139 lb)   SpO2 99%   BMI 24 62 kg/m²     Physical Exam       Gen: NAD  Heent: atraumatic, normocephalic  Mouth: is moist  Neck: is supple, no JVD, no carotid bruits     Heart: is regular Normal s1, s2,  Lungs: are clear to auscultation  Abd: soft, non tender, NABS  Ext: no edema, distal pulses normal  Skin: no rashes, ulcers  Mood: normal affect  Neuro: AAOx3    Lorraine Jay MD    [facility] [place]

## 2022-04-15 ENCOUNTER — HOSPITAL ENCOUNTER (OUTPATIENT)
Dept: INFUSION CENTER | Facility: HOSPITAL | Age: 52
Discharge: HOME | End: 2022-04-15
Attending: INTERNAL MEDICINE
Payer: COMMERCIAL

## 2022-04-15 VITALS
DIASTOLIC BLOOD PRESSURE: 58 MMHG | TEMPERATURE: 97 F | RESPIRATION RATE: 18 BRPM | HEART RATE: 52 BPM | SYSTOLIC BLOOD PRESSURE: 124 MMHG

## 2022-04-15 DIAGNOSIS — D50.9 IRON DEFICIENCY ANEMIA, UNSPECIFIED IRON DEFICIENCY ANEMIA TYPE: Primary | ICD-10-CM

## 2022-04-15 PROCEDURE — 96365 THER/PROPH/DIAG IV INF INIT: CPT

## 2022-04-15 RX ORDER — SODIUM CHLORIDE 9 MG/ML
20 INJECTION, SOLUTION INTRAVENOUS ONCE
Status: COMPLETED | OUTPATIENT
Start: 2022-04-15 | End: 2022-04-15

## 2022-04-15 RX ORDER — SODIUM CHLORIDE 9 MG/ML
20 INJECTION, SOLUTION INTRAVENOUS ONCE
Status: CANCELLED | OUTPATIENT
Start: 2022-04-15

## 2022-04-15 RX ADMIN — IRON SUCROSE 200 MG: 20 INJECTION, SOLUTION INTRAVENOUS at 14:21

## 2022-04-15 RX ADMIN — SODIUM CHLORIDE 20 ML/HR: 0.9 INJECTION, SOLUTION INTRAVENOUS at 14:21

## 2022-04-15 NOTE — PROGRESS NOTES
Pt tolerated venofer today without incident  Pt declines avs no further infusion appts at this time

## 2022-04-25 PROBLEM — Z00.00 ANNUAL PHYSICAL EXAM: Status: ACTIVE | Noted: 2022-04-25

## 2022-05-05 ENCOUNTER — TELEPHONE (OUTPATIENT)
Dept: HEMATOLOGY ONCOLOGY | Facility: CLINIC | Age: 52
End: 2022-05-05

## 2022-05-05 NOTE — TELEPHONE ENCOUNTER
Left message for patient to have blood work completed prior to appointment with APOLINAR Michaels on 5/9/22

## 2022-05-06 ENCOUNTER — LAB (OUTPATIENT)
Dept: LAB | Facility: HOSPITAL | Age: 52
End: 2022-05-06
Attending: INTERNAL MEDICINE
Payer: COMMERCIAL

## 2022-05-06 ENCOUNTER — APPOINTMENT (OUTPATIENT)
Dept: LAB | Facility: HOSPITAL | Age: 52
End: 2022-05-06

## 2022-05-06 DIAGNOSIS — D50.9 IRON DEFICIENCY ANEMIA, UNSPECIFIED IRON DEFICIENCY ANEMIA TYPE: ICD-10-CM

## 2022-05-06 DIAGNOSIS — K90.9 MALABSORPTION SYNDROME: ICD-10-CM

## 2022-05-06 DIAGNOSIS — Z00.8 HEALTH EXAMINATION IN POPULATION SURVEY: ICD-10-CM

## 2022-05-06 LAB
BASOPHILS # BLD AUTO: 0.02 THOUSANDS/ΜL (ref 0–0.1)
BASOPHILS NFR BLD AUTO: 0 % (ref 0–1)
CHOLEST SERPL-MCNC: 202 MG/DL
EOSINOPHIL # BLD AUTO: 0.04 THOUSAND/ΜL (ref 0–0.61)
EOSINOPHIL NFR BLD AUTO: 1 % (ref 0–6)
ERYTHROCYTE [DISTWIDTH] IN BLOOD BY AUTOMATED COUNT: 24 % (ref 11.6–15.1)
EST. AVERAGE GLUCOSE BLD GHB EST-MCNC: 88 MG/DL
FERRITIN SERPL-MCNC: 69 NG/ML (ref 8–388)
HBA1C MFR BLD: 4.7 %
HCT VFR BLD AUTO: 49.7 % (ref 34.8–46.1)
HDLC SERPL-MCNC: 65 MG/DL
HGB BLD-MCNC: 15.5 G/DL (ref 11.5–15.4)
IMM GRANULOCYTES # BLD AUTO: 0 THOUSAND/UL (ref 0–0.2)
IMM GRANULOCYTES NFR BLD AUTO: 0 % (ref 0–2)
LDLC SERPL CALC-MCNC: 111 MG/DL (ref 0–100)
LYMPHOCYTES # BLD AUTO: 1.62 THOUSANDS/ΜL (ref 0.6–4.47)
LYMPHOCYTES NFR BLD AUTO: 36 % (ref 14–44)
MCH RBC QN AUTO: 29.4 PG (ref 26.8–34.3)
MCHC RBC AUTO-ENTMCNC: 31.2 G/DL (ref 31.4–37.4)
MCV RBC AUTO: 94 FL (ref 82–98)
MONOCYTES # BLD AUTO: 0.36 THOUSAND/ΜL (ref 0.17–1.22)
MONOCYTES NFR BLD AUTO: 8 % (ref 4–12)
NEUTROPHILS # BLD AUTO: 2.46 THOUSANDS/ΜL (ref 1.85–7.62)
NEUTS SEG NFR BLD AUTO: 55 % (ref 43–75)
NONHDLC SERPL-MCNC: 137 MG/DL
NRBC BLD AUTO-RTO: 0 /100 WBCS
PLATELET # BLD AUTO: 187 THOUSANDS/UL (ref 149–390)
RBC # BLD AUTO: 5.28 MILLION/UL (ref 3.81–5.12)
RETICS # AUTO: NORMAL 10*3/UL (ref 14097–95744)
RETICS # CALC: 0.7 % (ref 0.37–1.87)
TRIGL SERPL-MCNC: 132 MG/DL
WBC # BLD AUTO: 4.5 THOUSAND/UL (ref 4.31–10.16)

## 2022-05-06 PROCEDURE — 83918 ORGANIC ACIDS TOTAL QUANT: CPT

## 2022-05-06 PROCEDURE — 83036 HEMOGLOBIN GLYCOSYLATED A1C: CPT

## 2022-05-06 PROCEDURE — 80061 LIPID PANEL: CPT

## 2022-05-06 PROCEDURE — 85045 AUTOMATED RETICULOCYTE COUNT: CPT

## 2022-05-06 PROCEDURE — 36415 COLL VENOUS BLD VENIPUNCTURE: CPT

## 2022-05-06 PROCEDURE — 82728 ASSAY OF FERRITIN: CPT

## 2022-05-06 PROCEDURE — 85025 COMPLETE CBC W/AUTO DIFF WBC: CPT

## 2022-05-09 ENCOUNTER — OFFICE VISIT (OUTPATIENT)
Dept: HEMATOLOGY ONCOLOGY | Facility: CLINIC | Age: 52
End: 2022-05-09
Payer: COMMERCIAL

## 2022-05-09 VITALS
HEIGHT: 63 IN | DIASTOLIC BLOOD PRESSURE: 82 MMHG | WEIGHT: 138.2 LBS | OXYGEN SATURATION: 100 % | TEMPERATURE: 98.2 F | BODY MASS INDEX: 24.49 KG/M2 | HEART RATE: 73 BPM | RESPIRATION RATE: 17 BRPM | SYSTOLIC BLOOD PRESSURE: 130 MMHG

## 2022-05-09 DIAGNOSIS — D50.9 IRON DEFICIENCY ANEMIA, UNSPECIFIED IRON DEFICIENCY ANEMIA TYPE: ICD-10-CM

## 2022-05-09 DIAGNOSIS — Z98.84 STATUS POST BARIATRIC SURGERY: Primary | ICD-10-CM

## 2022-05-09 DIAGNOSIS — K90.9 MALABSORPTION SYNDROME: ICD-10-CM

## 2022-05-09 PROCEDURE — 99214 OFFICE O/P EST MOD 30 MIN: CPT | Performed by: PHYSICIAN ASSISTANT

## 2022-05-09 NOTE — PROGRESS NOTES
Hematology/Oncology Outpatient Follow-up  Annalee Lynn 45 y o  female 1970 [id number]    Date:  5/9/2022      Assessment and Plan:    3  Iron deficiency anemia, 1  Status post bariatric surgery,   2  Malabsorption syndrome  39-year-old female presents for follow-up regarding history of iron deficiency anemia secondary to iron deficiency from malabsorption from recent bariatric surgery  She had Venofer weekly x7 and tolerated this well without any adverse effects  She notes some improvement in her fatigue  Hemoglobin has improved  Iron studies also improved  Recommend repeat in 3 months to determine maintenance schedule  She is agreeable  Repeat again in 6 months a follow-up at that time  She is to call for review labs at 3 month interval     - CBC and differential; Standing  - Ferritin; Standing    HPI:  39-year-old female presents for follow-up regarding history of iron deficiency anemia  Patient had gastric sleeve surgery in [facility] in 2021  She lost approximately 50 lb  Patient does not have menorrhagia  Patient previously had states she had an EGD and colonoscopy within the last 3 years  She could not tolerate oral iron  She had Venofer 200 mg IV weekly x 7  Interval history:     ROS: Review of Systems   Constitutional: Positive for fatigue (improving though since IV iron)  Negative for chills and fever  Respiratory: Negative for cough and shortness of breath  Cardiovascular: Negative for chest pain, palpitations and leg swelling  Gastrointestinal: Negative for abdominal pain, constipation, diarrhea, nausea and vomiting  Genitourinary: Negative for difficulty urinating, dysuria and hematuria  Musculoskeletal: Negative for arthralgias  Skin: Negative  Neurological: Negative for dizziness, weakness, light-headedness, numbness and headaches  Hematological: Negative  Psychiatric/Behavioral: Negative          Past Medical History:   Diagnosis Date    Anemia     HLD (hyperlipidemia)     Other hyperlipidemia 2022    Palpitations 2021    Sarcoidosis        Past Surgical History:   Procedure Laterality Date    ANKLE SURGERY Right 2018    COLONOSCOPY  2018    GASTRECTOMY SLEEVE LAPAROSCOPIC  2021    KNEE ARTHROSCOPY W/ MENISCAL REPAIR Left 2013    TUBAL LIGATION  1997    UPPER GASTROINTESTINAL ENDOSCOPY         Social History     Socioeconomic History    Marital status: Registered Domestic Partner     Spouse name: None    Number of children: None    Years of education: None    Highest education level: None   Occupational History    None   Tobacco Use    Smoking status: Former Smoker     Packs/day: 0 25     Years: 20 00     Pack years: 5 00     Types: Cigarettes     Quit date:      Years since quittin 3    Smokeless tobacco: Never Used   Vaping Use    Vaping Use: Never used   Substance and Sexual Activity    Alcohol use: Not Currently    Drug use: Never    Sexual activity: Not Currently   Other Topics Concern    None   Social History Narrative    None     Social Determinants of Health     Financial Resource Strain: Not on file   Food Insecurity: Not on file   Transportation Needs: Not on file   Physical Activity: Not on file   Stress: Not on file   Social Connections: Not on file   Intimate Partner Violence: Not on file   Housing Stability: Not on file       Family History   Problem Relation Age of Onset    Dementia Mother     Seizures Mother     Hypertension Mother     Osteoporosis Mother     Glaucoma Mother     Macular degeneration Mother     Hearing loss Mother     Hyperlipidemia Mother     No Known Problems Sister     No Known Problems Daughter     No Known Problems Maternal Grandmother     No Known Problems Maternal Grandfather     No Known Problems Daughter     No Known Problems Maternal Aunt     No Known Problems Father     No Known Problems Paternal Grandmother     No Known Problems Paternal Grandfather Allergies   Allergen Reactions    Penicillin G Hives         Current Outpatient Medications:     Calcium Carbonate-Vit D-Min (Calcium 1200) 5731-1867 MG-UNIT CHEW, , Disp: , Rfl:     Cholecalciferol (D3 High Potency) 50 MCG (2000 UT) CAPS, , Disp: , Rfl:     folic acid (FOLVITE) 1 mg tablet, , Disp: , Rfl:       Physical Exam:  /82 (BP Location: Left arm, Patient Position: Sitting, Cuff Size: Adult)   Pulse 73   Temp 98 2 °F (36 8 °C)   Resp 17   Ht 5' 3" (1 6 m)   Wt 62 7 kg (138 lb 3 2 oz)   SpO2 100%   BMI 24 48 kg/m²     Physical Exam  Vitals reviewed  Constitutional:       General: She is not in acute distress  Appearance: She is well-developed  She is not ill-appearing  HENT:      Head: Normocephalic and atraumatic  Eyes:      General: No scleral icterus  Conjunctiva/sclera: Conjunctivae normal    Cardiovascular:      Rate and Rhythm: Normal rate and regular rhythm  Heart sounds: Normal heart sounds  No murmur heard  Pulmonary:      Effort: Pulmonary effort is normal  No respiratory distress  Breath sounds: Normal breath sounds  Abdominal:      Palpations: Abdomen is soft  Tenderness: There is no abdominal tenderness  Musculoskeletal:         General: No tenderness  Normal range of motion  Cervical back: Normal range of motion and neck supple  Right lower leg: No edema  Left lower leg: No edema  Lymphadenopathy:      Cervical: No cervical adenopathy  Skin:     General: Skin is warm and dry  Neurological:      Mental Status: She is alert and oriented to person, place, and time  Cranial Nerves: No cranial nerve deficit     Psychiatric:         Mood and Affect: Mood normal          Behavior: Behavior normal        Labs:  Lab Results   Component Value Date    WBC 4 50 05/06/2022    HGB 15 5 (H) 05/06/2022    HCT 49 7 (H) 05/06/2022    MCV 94 05/06/2022     05/06/2022     Lab Results   Component Value Date    K 4 2 02/23/2022     02/23/2022    CO2 27 02/23/2022    BUN 14 02/23/2022    CREATININE 0 86 02/23/2022    GLUF 86 02/23/2022    CALCIUM 8 8 02/23/2022    AST 12 02/23/2022    ALT 18 02/23/2022    ALKPHOS 67 02/23/2022    EGFR 77 02/23/2022       Patient voiced understanding and agreement in the above discussion  Aware to contact our office with questions/symptoms in the interim  This note has been generated by voice recognition software system  Therefore, there may be spelling, grammar, and or syntax errors  Please contact if questions arise

## 2022-05-14 LAB — METHYLMALONATE SERPL-SCNC: 2378 NMOL/L (ref 0–378)

## 2022-05-23 DIAGNOSIS — E53.8 B12 DEFICIENCY: Primary | ICD-10-CM

## 2022-05-23 DIAGNOSIS — E53.8 FOLATE DEFICIENCY: ICD-10-CM

## 2022-06-14 ENCOUNTER — APPOINTMENT (OUTPATIENT)
Dept: LAB | Facility: HOSPITAL | Age: 52
End: 2022-06-14
Payer: COMMERCIAL

## 2022-06-14 LAB
BASOPHILS # BLD AUTO: 0.02 THOUSANDS/ΜL (ref 0–0.1)
BASOPHILS NFR BLD AUTO: 0 % (ref 0–1)
EOSINOPHIL # BLD AUTO: 0.03 THOUSAND/ΜL (ref 0–0.61)
EOSINOPHIL NFR BLD AUTO: 1 % (ref 0–6)
ERYTHROCYTE [DISTWIDTH] IN BLOOD BY AUTOMATED COUNT: 17.7 % (ref 11.6–15.1)
FERRITIN SERPL-MCNC: 32 NG/ML (ref 8–388)
HCT VFR BLD AUTO: 49.7 % (ref 34.8–46.1)
HGB BLD-MCNC: 15.8 G/DL (ref 11.5–15.4)
IMM GRANULOCYTES # BLD AUTO: 0.02 THOUSAND/UL (ref 0–0.2)
IMM GRANULOCYTES NFR BLD AUTO: 0 % (ref 0–2)
LYMPHOCYTES # BLD AUTO: 1.67 THOUSANDS/ΜL (ref 0.6–4.47)
LYMPHOCYTES NFR BLD AUTO: 30 % (ref 14–44)
MCH RBC QN AUTO: 30.6 PG (ref 26.8–34.3)
MCHC RBC AUTO-ENTMCNC: 31.8 G/DL (ref 31.4–37.4)
MCV RBC AUTO: 96 FL (ref 82–98)
MONOCYTES # BLD AUTO: 0.47 THOUSAND/ΜL (ref 0.17–1.22)
MONOCYTES NFR BLD AUTO: 8 % (ref 4–12)
NEUTROPHILS # BLD AUTO: 3.38 THOUSANDS/ΜL (ref 1.85–7.62)
NEUTS SEG NFR BLD AUTO: 61 % (ref 43–75)
NRBC BLD AUTO-RTO: 0 /100 WBCS
PLATELET # BLD AUTO: 175 THOUSANDS/UL (ref 149–390)
PMV BLD AUTO: 11.8 FL (ref 8.9–12.7)
RBC # BLD AUTO: 5.17 MILLION/UL (ref 3.81–5.12)
WBC # BLD AUTO: 5.59 THOUSAND/UL (ref 4.31–10.16)

## 2022-06-14 PROCEDURE — 82728 ASSAY OF FERRITIN: CPT | Performed by: PHYSICIAN ASSISTANT

## 2022-06-14 PROCEDURE — 36415 COLL VENOUS BLD VENIPUNCTURE: CPT | Performed by: PHYSICIAN ASSISTANT

## 2022-06-14 PROCEDURE — 85025 COMPLETE CBC W/AUTO DIFF WBC: CPT | Performed by: PHYSICIAN ASSISTANT

## 2022-08-11 ENCOUNTER — OFFICE VISIT (OUTPATIENT)
Dept: GASTROENTEROLOGY | Facility: CLINIC | Age: 52
End: 2022-08-11
Payer: COMMERCIAL

## 2022-08-11 VITALS
DIASTOLIC BLOOD PRESSURE: 70 MMHG | RESPIRATION RATE: 18 BRPM | WEIGHT: 137 LBS | HEART RATE: 73 BPM | SYSTOLIC BLOOD PRESSURE: 122 MMHG | OXYGEN SATURATION: 98 % | HEIGHT: 63 IN | TEMPERATURE: 97.6 F | BODY MASS INDEX: 24.27 KG/M2

## 2022-08-11 DIAGNOSIS — Z12.11 SCREENING FOR COLON CANCER: Primary | ICD-10-CM

## 2022-08-11 PROCEDURE — 99214 OFFICE O/P EST MOD 30 MIN: CPT | Performed by: PHYSICIAN ASSISTANT

## 2022-08-11 RX ORDER — POLYETHYLENE GLYCOL 3350 17 G/17G
POWDER, FOR SOLUTION ORAL
Qty: 238 G | Refills: 0 | Status: SHIPPED | OUTPATIENT
Start: 2022-08-11

## 2022-08-11 NOTE — PATIENT INSTRUCTIONS
Scheduled date of colonoscopy (as of today):  10/18/22  Physician performing colonoscopy:  Dr Carmen Giron  Location of colonoscopy:  [facility]  Bowel prep reviewed with patient:  Dulcolax/Miralax  Instructions reviewed with patient by:  Holly  Clearances:  n/a

## 2022-08-11 NOTE — PROGRESS NOTES
[facility] - Outpatient Follow-up Note  Jossy Boogie 47 y o  female MRN: [id number]  Encounter: [id number]      Assessment and Plan    1  Colon cancer screening  The patient's last colonoscopy was in 2018 with multiple polyps removed  Pathology not available for my review  Was recommended that she repeat colonoscopy 3 years later  At this time she is doing well without any GI symptoms or complaints  She denies all alarm symptoms  She does have iron deficiency anemia which is thought to be secondary to her sleeve gastrectomy  She is on IV iron as she has been unable to tolerate oral in the past   - discussed colonoscopy in detail for which the patient is agreeable to proceeding, risks reviewed  - MiraLax/Dulcolax bowel prep instructions reviewed and provided    Follow up as needed after colonoscopy    ______________________________________________________________________    History of Present Illness  Jossy Boogie is a 47 y o  female s/p sleeve gastrectomy with iron def anemia here for follow up evaluation of atypical chest pain and colon cancer screening  The patient was seen a year ago with atypical chest pain  She underwent cardiac workup which was normal   Was thought to be secondary to acid reflux  It was recommended she undergo EGD at the same time as a colonoscopy this year however the patient is now status post sleeve gastrectomy and is doing very well  She has no further acid reflux and is not currently on any medication for this  Her last colonoscopy was in 2018 with multiple polyps removed, hemorrhoids, and diverticula  Polyp pathology not available for my review Repeat was recommended 3 years later  Review of Systems   Constitutional: Negative for activity change, appetite change, chills, fatigue, fever and unexpected weight change     Gastrointestinal: Negative for abdominal distention, abdominal pain, anal bleeding, blood in stool, constipation, diarrhea, nausea, rectal pain and vomiting         Past Medical History  Past Medical History:   Diagnosis Date    Anemia     HLD (hyperlipidemia)     Other hyperlipidemia 2022    Palpitations 2021    Sarcoidosis        Past Social history  Past Surgical History:   Procedure Laterality Date    ANKLE SURGERY Right 2018    COLONOSCOPY  2018    GASTRECTOMY SLEEVE LAPAROSCOPIC  2021    KNEE ARTHROSCOPY W/ MENISCAL REPAIR Left 2013    TUBAL LIGATION  1997    UPPER GASTROINTESTINAL ENDOSCOPY       Social History     Socioeconomic History    Marital status: Registered Domestic Partner     Spouse name: Not on file    Number of children: Not on file    Years of education: Not on file    Highest education level: Not on file   Occupational History    Not on file   Tobacco Use    Smoking status: Former Smoker     Packs/day: 0 25     Years: 20 00     Pack years: 5 00     Types: Cigarettes     Quit date:      Years since quittin 6    Smokeless tobacco: Never Used   Vaping Use    Vaping Use: Never used   Substance and Sexual Activity    Alcohol use: Not Currently    Drug use: Never    Sexual activity: Not Currently   Other Topics Concern    Not on file   Social History Narrative    Not on file     Social Determinants of Health     Financial Resource Strain: Not on file   Food Insecurity: Not on file   Transportation Needs: Not on file   Physical Activity: Not on file   Stress: Not on file   Social Connections: Not on file   Intimate Partner Violence: Not on file   Housing Stability: Not on file     Social History     Substance and Sexual Activity   Alcohol Use Not Currently     Social History     Substance and Sexual Activity   Drug Use Never     Social History     Tobacco Use   Smoking Status Former Smoker    Packs/day: 0 25    Years: 20 00    Pack years: 5 00    Types: Cigarettes    Quit date:     Years since quittin 6   Smokeless Tobacco Never Used       Past Family History  Family History   Problem Relation Age of Onset    Dementia Mother     Seizures Mother     Hypertension Mother     Osteoporosis Mother     Glaucoma Mother     Macular degeneration Mother     Hearing loss Mother     Hyperlipidemia Mother     No Known Problems Sister     No Known Problems Daughter     No Known Problems Maternal Grandmother     No Known Problems Maternal Grandfather     No Known Problems Daughter     No Known Problems Maternal Aunt     No Known Problems Father     No Known Problems Paternal Grandmother     No Known Problems Paternal Grandfather        Current Medications  Current Outpatient Medications   Medication Sig Dispense Refill    Calcium Carbonate-Vit D-Min (Calcium 1200) 6397-2055 MG-UNIT CHEW       Cholecalciferol (D3 High Potency) 50 MCG (2000 UT) CAPS       folic acid (FOLVITE) 1 mg tablet        No current facility-administered medications for this visit  Allergies  Allergies   Allergen Reactions    Penicillin G Hives         The following portions of the patient's history were reviewed and updated as appropriate: allergies, current medications, past medical history, past social history, past surgical history and problem list       Vitals  Vitals:    08/11/22 0939   BP: 122/70   BP Location: Right arm   Patient Position: Sitting   Cuff Size: Adult   Pulse: 73   Resp: 18   Temp: 97 6 °F (36 4 °C)   TempSrc: Tympanic   SpO2: 98%   Weight: 62 1 kg (137 lb)   Height: 5' 3" (1 6 m)         Physical Exam  Constitutional   General appearance: Patient is seated and in no acute distress, well appearing and well nourished  Head and Face   Head and face: Normal     Eyes   Conjunctiva and lids: No erythema, swelling or discharge  Anicteric  Ears, Nose, Mouth, and Throat   Hearing: Normal     Neck: Supple, trachea midline  Pulmonary   Respiratory effort: No increased work of breathing or signs of respiratory distress      Lungs: Clear to ascultation, no wheezes, rhonchi, or rales  Cardiovascular   Heart: Regular rate and rhythm, no murmurs gallops or rubs   Examination of extremities for edema and/or varicosities: Normal     Abdomen   Abdomen: Soft, non-tender, no masses, no organomegaly  Normal bowel sounds  Musculoskeletal   Gait and station: Normal    Skin   Skin and subcutaneous tissue: Warm, dry, and intact  No visible jaundice, lesions or rashes  Psychiatric   Judgment and insight: Normal  Recent and remote memory:  Normal  Mood and affect: Normal      Results  No visits with results within 1 Day(s) from this visit  Latest known visit with results is:   Appointment on 06/04/2022   Component Date Value    Folate 06/14/2022 17 6 (A)    Vitamin B-12 06/14/2022 336        Radiology Results  No results found  Orders  No orders of the defined types were placed in this encounter

## 2022-10-13 ENCOUNTER — HOSPITAL ENCOUNTER (OUTPATIENT)
Dept: MAMMOGRAPHY | Facility: CLINIC | Age: 52
End: 2022-10-13
Payer: COMMERCIAL

## 2022-10-13 DIAGNOSIS — R92.8 ABNORMAL MAMMOGRAM: ICD-10-CM

## 2022-10-13 PROCEDURE — 77065 DX MAMMO INCL CAD UNI: CPT

## 2022-10-13 PROCEDURE — 76642 ULTRASOUND BREAST LIMITED: CPT

## 2022-10-13 PROCEDURE — G0279 TOMOSYNTHESIS, MAMMO: HCPCS

## 2022-10-17 RX ORDER — SODIUM CHLORIDE, SODIUM LACTATE, POTASSIUM CHLORIDE, CALCIUM CHLORIDE 600; 310; 30; 20 MG/100ML; MG/100ML; MG/100ML; MG/100ML
125 INJECTION, SOLUTION INTRAVENOUS CONTINUOUS
Status: CANCELLED | OUTPATIENT
Start: 2022-10-17

## 2022-10-17 RX ORDER — LIDOCAINE HYDROCHLORIDE 10 MG/ML
0.5 INJECTION, SOLUTION EPIDURAL; INFILTRATION; INTRACAUDAL; PERINEURAL ONCE AS NEEDED
Status: CANCELLED | OUTPATIENT
Start: 2022-10-17

## 2022-10-18 ENCOUNTER — TELEPHONE (OUTPATIENT)
Dept: GASTROENTEROLOGY | Facility: CLINIC | Age: 52
End: 2022-10-18

## 2022-11-07 ENCOUNTER — TELEPHONE (OUTPATIENT)
Dept: HEMATOLOGY ONCOLOGY | Facility: CLINIC | Age: 52
End: 2022-11-07

## 2022-11-09 ENCOUNTER — APPOINTMENT (OUTPATIENT)
Dept: LAB | Facility: HOSPITAL | Age: 52
End: 2022-11-09

## 2022-11-10 ENCOUNTER — ANNUAL EXAM (OUTPATIENT)
Dept: OBGYN CLINIC | Facility: CLINIC | Age: 52
End: 2022-11-10

## 2022-11-10 VITALS
WEIGHT: 137 LBS | SYSTOLIC BLOOD PRESSURE: 130 MMHG | HEIGHT: 63 IN | DIASTOLIC BLOOD PRESSURE: 70 MMHG | BODY MASS INDEX: 24.27 KG/M2

## 2022-11-10 DIAGNOSIS — N95.2 ATROPHIC VAGINITIS: ICD-10-CM

## 2022-11-10 DIAGNOSIS — N94.10 DYSPAREUNIA IN FEMALE: ICD-10-CM

## 2022-11-10 DIAGNOSIS — Z12.31 ENCOUNTER FOR SCREENING MAMMOGRAM FOR MALIGNANT NEOPLASM OF BREAST: ICD-10-CM

## 2022-11-10 DIAGNOSIS — Z01.419 WOMEN'S ANNUAL ROUTINE GYNECOLOGICAL EXAMINATION: Primary | ICD-10-CM

## 2022-11-10 DIAGNOSIS — Z12.11 COLON CANCER SCREENING: ICD-10-CM

## 2022-11-10 NOTE — PROGRESS NOTES
Assessment/Plan:    No problem-specific Assessment & Plan notes found for this encounter  Diagnoses and all orders for this visit:    Women's annual routine gynecological examination  -     Liquid-based pap, screening    Encounter for screening mammogram for malignant neoplasm of breast    Atrophic vaginitis    Dyspareunia in female    Colon cancer screening  -     Cologuard    Other orders  -     BIOTIN PO; Take by mouth          Normal gynecological physical examination  Self-breast examination stressed  Mammogram ordered  Discussed regular exercise, healthy diet, importance of vitamin D and calcium supplements  Discussed importance of sun block use during periods of prolonged sun exposure  Patient will be seen in 1 year for routine gynecologic and medical examination  Patient will call office for any problems, concerns, or issues which may arise during the interim  Subjective:          HPI    Patient ID: Kimberlee Reza is a 47 y o  female who presents today for her annual gynecologic and medical examination    Menstrual status: Patient reports no abnormal genital bleeding    Vasomotor symptoms: Reports symptoms of dyspareunia secondary to atrophic vaginitis  Suggestions using natural organic coconut oil as well as lubricants suggested  We will see patient for follow-up in several months for reevaluation  Patient reports normal appetite    Patient reports normal bowel and bladder habits    Patient denies any significant pelvic or abdominal pain    Patient denies any headaches, chest pain, shortness of breath fever shakes or chills    Patient denies any COVID 19 symptoms including cough or loss of taste or smell    COVID vaccine status: Patient up-to-date with COVID vaccination    Medical problems: No significant medical problems    Colonoscopy status: Colon cancer screening ordered    Mammogram status: Patient does regular self breast examinations and is up-to-date with screening mammography  Appropriate arrangements for her annual screening mammogram were placed into the EMR system at today's visit  The following portions of the patient's history were reviewed and updated as appropriate: allergies, current medications, past family history, past medical history, past social history, past surgical history and problem list     Review of Systems   Constitutional: Negative  Negative for appetite change, diaphoresis, fatigue, fever and unexpected weight change  HENT: Negative  Eyes: Negative  Respiratory: Negative  Cardiovascular: Negative  Gastrointestinal: Negative  Negative for abdominal pain, blood in stool, constipation, diarrhea, nausea and vomiting  Endocrine: Negative  Negative for cold intolerance and heat intolerance  Genitourinary: Negative  Negative for dysuria, frequency, hematuria, urgency, vaginal bleeding, vaginal discharge and vaginal pain  Musculoskeletal: Negative  Skin: Negative  Allergic/Immunologic: Negative  Neurological: Negative  Hematological: Negative  Negative for adenopathy  Psychiatric/Behavioral: Negative  Objective:      /70   Ht 5' 3" (1 6 m)   Wt 62 1 kg (137 lb)   BMI 24 27 kg/m²          Physical Exam  Constitutional:       General: She is not in acute distress  Appearance: Normal appearance  She is well-developed  She is not diaphoretic  HENT:      Head: Normocephalic and atraumatic  Eyes:      Pupils: Pupils are equal, round, and reactive to light  Cardiovascular:      Rate and Rhythm: Normal rate and regular rhythm  Heart sounds: Normal heart sounds  No murmur heard  No friction rub  No gallop  Pulmonary:      Effort: Pulmonary effort is normal       Breath sounds: Normal breath sounds  Chest:   Breasts:     Breasts are symmetrical       Right: No inverted nipple, mass, nipple discharge, skin change or tenderness        Left: No inverted nipple, mass, nipple discharge, skin change or tenderness  Abdominal:      General: Bowel sounds are normal       Palpations: Abdomen is soft  Genitourinary:     General: Normal vulva  Exam position: Supine  Labia:         Right: No rash or lesion  Left: No rash or lesion  Urethra: No urethral swelling or urethral lesion  Vagina: Normal  No vaginal discharge, erythema, tenderness or bleeding  Cervix: No discharge or friability  Uterus: Not enlarged and not tender  Adnexa:         Right: No mass, tenderness or fullness  Left: No mass, tenderness or fullness  Rectum: Normal  Guaiac result negative  Comments: Pelvic exam revealed minimal atrophic vaginitis  Good pelvic support confirmed  Musculoskeletal:         General: Normal range of motion  Cervical back: Normal range of motion and neck supple  Lymphadenopathy:      Cervical: No cervical adenopathy  Upper Body:      Right upper body: No supraclavicular adenopathy  Left upper body: No supraclavicular adenopathy  Skin:     General: Skin is warm and dry  Findings: No rash  Neurological:      General: No focal deficit present  Mental Status: She is alert and oriented to person, place, and time  Psychiatric:         Mood and Affect: Mood normal          Speech: Speech normal          Behavior: Behavior normal          Thought Content:  Thought content normal          Judgment: Judgment normal

## 2022-11-10 NOTE — PATIENT INSTRUCTIONS
Normal gynecological physical examination  Self-breast examination stressed  Mammogram ordered  Discussed regular exercise, healthy diet, importance of vitamin D and calcium supplements  Discussed importance of sun block use during periods of prolonged sun exposure  Patient will be seen in 1 year for routine gynecologic and medical examination  Patient will call office for any problems, concerns, or issues which may arise during the interim    Follow-up visit in 4-6 months for evaluation of atrophic vaginal changes and treatment with nonhormonal options

## 2022-11-11 ENCOUNTER — OFFICE VISIT (OUTPATIENT)
Dept: HEMATOLOGY ONCOLOGY | Facility: CLINIC | Age: 52
End: 2022-11-11

## 2022-11-11 VITALS
BODY MASS INDEX: 24.63 KG/M2 | WEIGHT: 139 LBS | SYSTOLIC BLOOD PRESSURE: 138 MMHG | OXYGEN SATURATION: 98 % | RESPIRATION RATE: 16 BRPM | HEART RATE: 56 BPM | TEMPERATURE: 97.4 F | HEIGHT: 63 IN | DIASTOLIC BLOOD PRESSURE: 86 MMHG

## 2022-11-11 DIAGNOSIS — E53.8 B12 DEFICIENCY: Primary | ICD-10-CM

## 2022-11-11 DIAGNOSIS — Z98.84 STATUS POST BARIATRIC SURGERY: ICD-10-CM

## 2022-11-11 DIAGNOSIS — D50.9 IRON DEFICIENCY ANEMIA, UNSPECIFIED IRON DEFICIENCY ANEMIA TYPE: ICD-10-CM

## 2022-11-11 NOTE — PROGRESS NOTES
Hematology/Oncology Outpatient Follow-up  Annalee Lynn 47 y o  female 1970 [id number]    Date:  11/11/2022      Assessment and Plan:    1  B12 deficiency, 2  Iron deficiency anemia, 3  Status post bariatric surgery  History of iron and B12 def from malabsorption from bariatric surgery  She is taking PO vitamins at this time  Hgb is excellent, 15 9  She feels well  Continue PO vitamins  Repeat labs in 3 months and 6 months, follow up in 6 months      - Vitamin B12; Standing  - CBC and differential; Standing  - Iron Panel (Includes Ferritin, Iron Sat%, Iron, and TIBC); Standing      HPI:  48-year-old female presents for follow-up regarding history of iron deficiency anemia  Patient had gastric sleeve surgery in [facility] in 2021  She lost approximately 50 lb  Patient does not have menorrhagia  Patient previously had states she had an EGD and colonoscopy within the last 3 years  She could not tolerate oral iron      She had Venofer 200 mg IV weekly x 7 in March - April 2022  Interval history: she is taking iron gummies, folic acid, mg supplement     ROS: Review of Systems   Constitutional: Negative for activity change, appetite change, chills, fatigue, fever and unexpected weight change  Respiratory: Negative for cough and shortness of breath  Cardiovascular: Negative for chest pain, palpitations and leg swelling  Gastrointestinal: Negative for abdominal pain, constipation, diarrhea, nausea and vomiting  Genitourinary: Negative for difficulty urinating, dysuria and hematuria  Musculoskeletal: Negative for arthralgias  Skin: Negative  Neurological: Negative for dizziness, weakness, light-headedness, numbness and headaches  Hematological: Negative  Psychiatric/Behavioral: Negative          Past Medical History:   Diagnosis Date   • Anemia    • HLD (hyperlipidemia)    • Other hyperlipidemia 1/12/2022   • Palpitations 2/17/2021   • Sarcoidosis        Past Surgical History:   Procedure Laterality Date   • ANKLE SURGERY Right 2018   • COLONOSCOPY  2018   • GASTRECTOMY SLEEVE LAPAROSCOPIC  2021   • KNEE ARTHROSCOPY W/ MENISCAL REPAIR Left    • TUBAL LIGATION     • UPPER GASTROINTESTINAL ENDOSCOPY         Social History     Socioeconomic History   • Marital status: Registered Domestic Partner     Spouse name: None   • Number of children: None   • Years of education: None   • Highest education level: None   Occupational History   • None   Tobacco Use   • Smoking status: Former Smoker     Packs/day: 0 25     Years: 20 00     Pack years: 5 00     Types: Cigarettes     Quit date:      Years since quittin 8   • Smokeless tobacco: Never Used   Vaping Use   • Vaping Use: Never used   Substance and Sexual Activity   • Alcohol use: Not Currently   • Drug use: Never   • Sexual activity: Not Currently   Other Topics Concern   • None   Social History Narrative   • None     Social Determinants of Health     Financial Resource Strain: Not on file   Food Insecurity: Not on file   Transportation Needs: Not on file   Physical Activity: Not on file   Stress: Not on file   Social Connections: Not on file   Intimate Partner Violence: Not on file   Housing Stability: Not on file       Family History   Problem Relation Age of Onset   • Dementia Mother    • Seizures Mother    • Hypertension Mother    • Osteoporosis Mother    • Glaucoma Mother    • Macular degeneration Mother    • Hearing loss Mother    • Hyperlipidemia Mother    • No Known Problems Sister    • No Known Problems Daughter    • No Known Problems Maternal Grandmother    • No Known Problems Maternal Grandfather    • No Known Problems Daughter    • No Known Problems Maternal Aunt    • No Known Problems Father    • No Known Problems Paternal Grandmother    • No Known Problems Paternal Grandfather        Allergies   Allergen Reactions   • Penicillin G Hives         Current Outpatient Medications:   •  BIOTIN PO, Take by mouth, Disp: , Rfl: •  Calcium Carbonate-Vit D-Min (Calcium 1200) 9434-1636 MG-UNIT CHEW, , Disp: , Rfl:   •  Cholecalciferol (D3 High Potency) 50 MCG (2000 UT) CAPS, , Disp: , Rfl:   •  Ferrous Sulfate (FERRA PO), Take by mouth, Disp: , Rfl:   •  folic acid (FOLVITE) 1 mg tablet, , Disp: , Rfl:   •  bisacodyl (DULCOLAX) 5 mg EC tablet, Take as directed as per written office instructions, Disp: 2 tablet, Rfl: 0  •  polyethylene glycol (GLYCOLAX) 17 GM/SCOOP powder, Take as directed as per written office instructions, Disp: 238 g, Rfl: 0      Physical Exam:  /86 (BP Location: Left arm, Patient Position: Sitting, Cuff Size: Standard)   Pulse 56   Temp (!) 97 4 °F (36 3 °C) (Temporal)   Resp 16   Ht 5' 3" (1 6 m)   Wt 63 kg (139 lb)   SpO2 98%   BMI 24 62 kg/m²     Physical Exam  Vitals reviewed  Constitutional:       General: She is not in acute distress  Appearance: She is well-developed  She is not ill-appearing  HENT:      Head: Normocephalic and atraumatic  Eyes:      General: No scleral icterus  Conjunctiva/sclera: Conjunctivae normal    Cardiovascular:      Rate and Rhythm: Normal rate and regular rhythm  Heart sounds: Normal heart sounds  No murmur heard  Pulmonary:      Effort: Pulmonary effort is normal  No respiratory distress  Breath sounds: Normal breath sounds  Abdominal:      Palpations: Abdomen is soft  Tenderness: There is no abdominal tenderness  Musculoskeletal:         General: No tenderness  Normal range of motion  Cervical back: Normal range of motion and neck supple  Right lower leg: No edema  Left lower leg: No edema  Lymphadenopathy:      Cervical: No cervical adenopathy  Skin:     General: Skin is warm and dry  Neurological:      Mental Status: She is alert and oriented to person, place, and time  Cranial Nerves: No cranial nerve deficit     Psychiatric:         Mood and Affect: Mood normal          Behavior: Behavior normal  Labs:  Lab Results   Component Value Date    WBC 5 92 11/09/2022    HGB 15 9 (H) 11/09/2022    HCT 48 9 (H) 11/09/2022    MCV 98 11/09/2022     11/09/2022     Lab Results   Component Value Date    K 4 2 02/23/2022     02/23/2022    CO2 27 02/23/2022    BUN 14 02/23/2022    CREATININE 0 86 02/23/2022    GLUF 86 02/23/2022    CALCIUM 8 8 02/23/2022    AST 12 02/23/2022    ALT 18 02/23/2022    ALKPHOS 67 02/23/2022    EGFR 77 02/23/2022       Patient voiced understanding and agreement in the above discussion  Aware to contact our office with questions/symptoms in the interim  This note has been generated by voice recognition software system  Therefore, there may be spelling, grammar, and or syntax errors  Please contact if questions arise

## 2022-11-17 LAB
LAB AP GYN PRIMARY INTERPRETATION: NORMAL
LAB AP LMP: NORMAL
Lab: NORMAL

## 2022-12-01 ENCOUNTER — ULTRASOUND (OUTPATIENT)
Dept: OBGYN CLINIC | Facility: CLINIC | Age: 52
End: 2022-12-01

## 2022-12-01 DIAGNOSIS — R10.2 PELVIC PAIN: ICD-10-CM

## 2022-12-01 DIAGNOSIS — N83.201 CYST OF RIGHT OVARY: Primary | ICD-10-CM

## 2022-12-01 NOTE — PROGRESS NOTES
AMB US Pelvic Non OB    Date/Time: 12/1/2022 2:48 PM  Performed by: Jeremy Dorsey  Authorized by: Nasir Barahona MD     Procedure details:     Indications: ovarian cysts and non-obstetric abdominal pain      Technique:  US Pelvic, Non-OB with complete exam  Uterine findings:     Length (cm): 7 9    Height (cm):  6 1    Width (cm):  5 4    Uterine adhesions: not identified      Adnexal mass: not identified      Polyps: not identified      Myomas: not identified      Endometrial stripe: identified      Endometrial hyperplasia: not identified      Endometrium thickness (mm):  5  Left ovary findings:     Left ovary:  Visualized    Cysts: not identified      Length (cm): 3 2    Height (cm): 2 2    Width (cm): 2 5  Right ovary findings:     Right ovary:  Visualized    Cysts: not identified      Length (cm): 3    Height (cm): 2 5    Width (cm): 2 3  Other findings:     Free pelvic fluid: not identified      Free peritoneal fluid: not identified    Post-Procedure Details:     Impression:  XUCY-3OA  Small RT Follicle=16 x 15 x 15 mm    Tolerance: Tolerated well, no immediate complications  Additional Procedure Comments:          Dr Joel Curiel MD  [address] [facility] [address]  [address] [facility], [address] [place] [address]  [phone]

## 2022-12-04 LAB — COLOGUARD RESULT REPORTABLE: NEGATIVE

## 2022-12-05 ENCOUNTER — TELEPHONE (OUTPATIENT)
Dept: OBGYN CLINIC | Facility: CLINIC | Age: 52
End: 2022-12-05

## 2022-12-05 NOTE — TELEPHONE ENCOUNTER
----- Message from Carlos Byers MD sent at 12/4/2022  4:11 PM EST -----  Cologuard screen was negative     Thanks

## 2022-12-13 ENCOUNTER — TELEPHONE (OUTPATIENT)
Dept: OBGYN CLINIC | Facility: CLINIC | Age: 52
End: 2022-12-13

## 2023-05-01 ENCOUNTER — TELEPHONE (OUTPATIENT)
Dept: HEMATOLOGY ONCOLOGY | Facility: CLINIC | Age: 53
End: 2023-05-01

## 2023-05-01 NOTE — TELEPHONE ENCOUNTER
Appointment Change  Cancel, Reschedule, Change to Virtual      Who are you speaking with? Patient   If it is not the patient, are they listed on an active communication consent form? N/A   Which provider is the appointment scheduled with? Dr Elly Joy   When is the appointment scheduled? Please list date and time 5/11/23 @ 8am   At which location is the appointment scheduled to take place? [facility]   Was the appointment rescheduled or changed from an in person visit to a virtual visit? If so, please list the details of the change  Yes 7/24/23 @ 8am   What is the reason for the appointment change? Dr Elly Joy will be out of the office   Was [facility] transport scheduled for this visit? no   Does [facility] transport need to be scheduled for the new visit (if applicable) no   Does the patient need an infusion appointment rescheduled? no   Does the patient have an infusion appointment scheduled? If so, when? no   Is the patient undergoing chemotherapy? no   Was the no-show policy reviewed for appointments being changed with less then 24 hours of notice?  yes

## 2023-05-31 NOTE — PATIENT PROFILE ADULT. - FUNCTIONAL SCREEN CURRENT LEVEL: SWALLOWING (IF SCORE 2 OR MORE FOR ANY ITEM, CONSULT REHAB SERVICES), MLM)
Dad Trent has care questions from yesterdays visit.  [phone]  
Returned father's call. Pt father wondering if pt should be tested for mono. Advised to start antibiotic and if in several days pt not improving or worsening then we may reconsider mono testing.   
(0) swallows foods/liquids without difficulty

## 2023-06-06 ENCOUNTER — HOSPITAL ENCOUNTER (OUTPATIENT)
Dept: MAMMOGRAPHY | Facility: CLINIC | Age: 53
Discharge: HOME | End: 2023-06-06
Payer: COMMERCIAL

## 2023-06-06 DIAGNOSIS — Z09 FOLLOW-UP EXAM, 3-6 MONTHS SINCE PREVIOUS EXAM: ICD-10-CM

## 2023-06-06 PROCEDURE — G0279 TOMOSYNTHESIS, MAMMO: HCPCS

## 2023-06-06 PROCEDURE — 76642 ULTRASOUND BREAST LIMITED: CPT

## 2023-06-06 PROCEDURE — 77066 DX MAMMO INCL CAD BI: CPT

## 2023-06-07 NOTE — PHYSICAL THERAPY INITIAL EVALUATION ADULT - FOLLOWS COMMANDS/ANSWERS QUESTIONS, REHAB EVAL
100% of the time
no abdominal pain, no bloating, no constipation, no diarrhea, no nausea and no vomiting.

## 2023-06-27 ENCOUNTER — OFFICE VISIT (OUTPATIENT)
Dept: INTERNAL MEDICINE CLINIC | Facility: CLINIC | Age: 53
End: 2023-06-27
Payer: COMMERCIAL

## 2023-06-27 VITALS
BODY MASS INDEX: 26.86 KG/M2 | TEMPERATURE: 97.5 F | HEIGHT: 63 IN | OXYGEN SATURATION: 98 % | HEART RATE: 54 BPM | DIASTOLIC BLOOD PRESSURE: 74 MMHG | SYSTOLIC BLOOD PRESSURE: 120 MMHG | WEIGHT: 151.6 LBS

## 2023-06-27 DIAGNOSIS — Z00.00 ANNUAL PHYSICAL EXAM: ICD-10-CM

## 2023-06-27 DIAGNOSIS — Z98.84 STATUS POST BARIATRIC SURGERY: Primary | ICD-10-CM

## 2023-06-27 DIAGNOSIS — D86.9 SARCOID: ICD-10-CM

## 2023-06-27 DIAGNOSIS — I83.813 VARICOSE VEINS OF BOTH LOWER EXTREMITIES WITH PAIN: ICD-10-CM

## 2023-06-27 DIAGNOSIS — K21.9 GASTROESOPHAGEAL REFLUX DISEASE, UNSPECIFIED WHETHER ESOPHAGITIS PRESENT: ICD-10-CM

## 2023-06-27 DIAGNOSIS — E63.9 NUTRITIONAL DEFICIENCY: ICD-10-CM

## 2023-06-27 DIAGNOSIS — K21.9 CHEST PAIN DUE TO GERD: ICD-10-CM

## 2023-06-27 DIAGNOSIS — R07.9 CHEST PAIN DUE TO GERD: ICD-10-CM

## 2023-06-27 PROBLEM — R55 SYNCOPE AND COLLAPSE: Status: RESOLVED | Noted: 2021-02-17 | Resolved: 2023-06-27

## 2023-06-27 PROCEDURE — 99214 OFFICE O/P EST MOD 30 MIN: CPT | Performed by: INTERNAL MEDICINE

## 2023-06-27 PROCEDURE — 99396 PREV VISIT EST AGE 40-64: CPT | Performed by: INTERNAL MEDICINE

## 2023-06-27 RX ORDER — FAMOTIDINE 20 MG/1
20 TABLET, FILM COATED ORAL 2 TIMES DAILY
Qty: 180 TABLET | Refills: 3 | Status: SHIPPED | OUTPATIENT
Start: 2023-06-27 | End: 2024-06-21

## 2023-06-27 NOTE — ASSESSMENT & PLAN NOTE
Immunizations and preventive care screenings were discussed with patient today  Appropriate education was printed on patient's after visit summary  Counseling:  Alcohol/drug use: discussed moderation in alcohol intake, the recommendations for healthy alcohol use, and avoidance of illicit drug use  Dental Health: discussed importance of regular tooth brushing, flossing, and dental visits  Injury prevention: discussed safety/seat belts, safety helmets, smoke detectors, carbon dioxide detectors, and smoking near bedding or upholstery  · Sexual health: discussed sexually transmitted diseases, partner selection, use of condoms, avoidance of unintended pregnancy, and contraceptive alternatives

## 2023-06-27 NOTE — ASSESSMENT & PLAN NOTE
Repeat blood work    Possible gastritis secondary to oral iron supplementation recommend IV supplementation instead

## 2023-06-27 NOTE — PROGRESS NOTES
Name: Zack Meeks      : 1970      MRN: [id number]  Encounter Provider: Maddi Gordon MD  Encounter Date: 2023   Encounter department: [address] [place] [address]     1  Status post bariatric surgery  Assessment & Plan:  Recommend referral to bariatric surgery for continued surveillance      2  Varicose veins of both lower extremities with pain  Assessment & Plan:  Leg elevation, compression wear  Possible evaluation for intevention    Orders:  -     Ambulatory Referral to Vascular Surgery; Future    3  Gastroesophageal reflux disease, unspecified whether esophagitis present  Assessment & Plan:  Recommend follow-up with gastroenterology for endoscopy    Orders:  -     famotidine (PEPCID) 20 mg tablet; Take 1 tablet (20 mg total) by mouth 2 (two) times a day    4  Chest pain due to GERD  Assessment & Plan:  Recommend follow-up with gastroenterology for endoscopy    Orders:  -     Ambulatory Referral to Gastroenterology; Future    5  Nutritional deficiency  -     Ambulatory Referral to Bariatric Surgery; Future    6  Annual physical exam  Assessment & Plan:  Immunizations and preventive care screenings were discussed with patient today  Appropriate education was printed on patient's after visit summary  Counseling:  Alcohol/drug use: discussed moderation in alcohol intake, the recommendations for healthy alcohol use, and avoidance of illicit drug use  Dental Health: discussed importance of regular tooth brushing, flossing, and dental visits  Injury prevention: discussed safety/seat belts, safety helmets, smoke detectors, carbon dioxide detectors, and smoking near bedding or upholstery  Sexual health: discussed sexually transmitted diseases, partner selection, use of condoms, avoidance of unintended pregnancy, and contraceptive alternatives  7  Sarcoid  -     Angiotensin converting enzyme; Future      BMI Counseling:  Body mass index is 26 85 kg/m²  The BMI is above normal  Nutrition recommendations include decreasing portion sizes and consuming healthier snacks  Rationale for BMI follow-up plan is due to patient being overweight or obese  Depression Screening and Follow-up Plan: Patient's depression screening was positive with a PHQ-2 score of 6  Their PHQ-9 score was 6  Patient assessed for underlying major depression  Brief counseling provided and recommend additional follow-up/re-evaluation next office visit  Depression likely due to other medical condition  Will treat underlying condition  Possibly associated with the fatigue that she is feeling        Subjective          Here today for annual examination  She has had a previous history of gastric bypass surgery 2 years ago  Present weight is 151 but she would like to be closer to 145 lb  She dropped a significant amount of weight and was down all the way down to 130 pounds  She did not feel good at this weight because she had severe fatigue   She was found to be iron deficient and needed iron transfusions done at the time  No additional blood work was performed for other nutritional deficiencies since she did not follow-up with bariatric surgery  She also complains of significant reflux symptoms, presently not on medication for it      Review of Systems   Constitutional: Positive for fatigue  HENT: Positive for postnasal drip  Cardiovascular: Positive for leg swelling  Musculoskeletal: Positive for arthralgias and back pain  Skin: Positive for color change  Varicose veins lower extremity   All other systems reviewed and are negative           Past Medical History:   Diagnosis Date   • Anemia    • HLD (hyperlipidemia)    • Other hyperlipidemia 1/12/2022   • Palpitations 2/17/2021   • Sarcoidosis      Past Surgical History:   Procedure Laterality Date   • ANKLE SURGERY Right 2018   • COLONOSCOPY  2018   • GASTRECTOMY SLEEVE LAPAROSCOPIC  july 8th 2021   • KNEE ARTHROSCOPY W/ MENISCAL REPAIR Left    • TUBAL LIGATION     • UPPER GASTROINTESTINAL ENDOSCOPY       Family History   Problem Relation Age of Onset   • Dementia Mother    • Seizures Mother    • Hypertension Mother    • Osteoporosis Mother    • Glaucoma Mother    • Macular degeneration Mother    • Hearing loss Mother    • Hyperlipidemia Mother    • No Known Problems Sister    • No Known Problems Daughter    • No Known Problems Maternal Grandmother    • No Known Problems Maternal Grandfather    • No Known Problems Daughter    • No Known Problems Maternal Aunt    • No Known Problems Father    • No Known Problems Paternal Grandmother    • No Known Problems Paternal Grandfather      Social History     Socioeconomic History   • Marital status: Registered Domestic Partner     Spouse name: None   • Number of children: None   • Years of education: None   • Highest education level: None   Occupational History   • None   Tobacco Use   • Smoking status: Former     Packs/day: 0 25     Years: 26 00     Total pack years: 6 50     Types: Cigarettes     Start date:      Quit date:      Years since quittin 4   • Smokeless tobacco: Never   • Tobacco comments:     Patient was a on and off smoker   Vaping Use   • Vaping Use: Never used   Substance and Sexual Activity   • Alcohol use: Yes     Comment: socially   • Drug use: Never   • Sexual activity: Not Currently   Other Topics Concern   • None   Social History Narrative   • None     Social Determinants of Health     Financial Resource Strain: Not on file   Food Insecurity: Not on file   Transportation Needs: Not on file   Physical Activity: Not on file   Stress: Not on file   Social Connections: Not on file   Intimate Partner Violence: Not on file   Housing Stability: Not on file     Current Outpatient Medications on File Prior to Visit   Medication Sig   • BIOTIN PO Take by mouth   • Calcium Carbonate-Vit D-Min (Calcium 1200) 9704-7326 MG-UNIT CHEW    • Cholecalciferol (D3 High "Potency) 50 MCG (2000 UT) CAPS    • Ferrous Sulfate (FERRA PO) Take by mouth   • folic acid (FOLVITE) 1 mg tablet    • Multiple Vitamins-Minerals (MULTIVITAMIN ADULTS 50+ PO) Take by mouth in the morning   • [DISCONTINUED] bisacodyl (DULCOLAX) 5 mg EC tablet Take as directed as per written office instructions   • [DISCONTINUED] polyethylene glycol (GLYCOLAX) 17 GM/SCOOP powder Take as directed as per written office instructions     Allergies   Allergen Reactions   • Penicillin G Hives     Immunization History   Administered Date(s) Administered   • COVID-19 MODERNA VACC 0 25 ML IM BOOSTER 12/01/2021   • COVID-19 MODERNA VACC 0 5 ML IM 01/10/2021, 02/09/2021, 12/01/2021   • INFLUENZA 10/05/2021       Objective     /74 (BP Location: Left arm, Patient Position: Sitting, Cuff Size: Standard)   Pulse (!) 54   Temp 97 5 °F (36 4 °C) (Temporal)   Ht 5' 3\" (1 6 m)   Wt 68 8 kg (151 lb 9 6 oz)   SpO2 98%   BMI 26 85 kg/m²     Physical Exam     Gen: NAD  Heent: atraumatic, normocephalic  Mouth: is moist  Neck: is supple, no JVD, no carotid bruits  Heart: is regular Normal s1, s2,  Lungs: are clear to auscultation  Abd: soft, non tender, NABS   Scars of surgery are well-healed  Ext: no edema, distal pulses normal  Skin: Varicose veins lower extremity, multiple  Mood: normal affect  Neuro: AAOx3  Irina Officer, MD  "

## 2023-07-05 ENCOUNTER — TELEPHONE (OUTPATIENT)
Dept: BARIATRICS | Facility: CLINIC | Age: 53
End: 2023-07-05

## 2023-07-05 ENCOUNTER — TELEPHONE (OUTPATIENT)
Age: 53
End: 2023-07-05

## 2023-07-05 NOTE — TELEPHONE ENCOUNTER
Transfer patient who had the Gastric Sleeve on July of 2021 in [facility] in [facility] by Dr. Shaheen Chavis. Gave patient fax number [phone] to have OP not faxed over to our office.

## 2023-07-18 ENCOUNTER — APPOINTMENT (OUTPATIENT)
Dept: LAB | Facility: HOSPITAL | Age: 53
End: 2023-07-18
Payer: COMMERCIAL

## 2023-07-18 ENCOUNTER — TELEPHONE (OUTPATIENT)
Dept: HEMATOLOGY ONCOLOGY | Facility: CLINIC | Age: 53
End: 2023-07-18

## 2023-07-18 DIAGNOSIS — D86.9 SARCOID: ICD-10-CM

## 2023-07-18 PROCEDURE — 82164 ANGIOTENSIN I ENZYME TEST: CPT

## 2023-07-18 PROCEDURE — 36415 COLL VENOUS BLD VENIPUNCTURE: CPT

## 2023-07-19 LAB — ACE SERPL-CCNC: 44 U/L (ref 14–82)

## 2023-07-24 ENCOUNTER — TELEPHONE (OUTPATIENT)
Dept: HEMATOLOGY ONCOLOGY | Facility: CLINIC | Age: 53
End: 2023-07-24

## 2023-07-24 ENCOUNTER — OFFICE VISIT (OUTPATIENT)
Dept: HEMATOLOGY ONCOLOGY | Facility: CLINIC | Age: 53
End: 2023-07-24
Payer: COMMERCIAL

## 2023-07-24 VITALS
BODY MASS INDEX: 27.46 KG/M2 | HEIGHT: 63 IN | HEART RATE: 51 BPM | RESPIRATION RATE: 18 BRPM | TEMPERATURE: 98 F | SYSTOLIC BLOOD PRESSURE: 118 MMHG | WEIGHT: 155 LBS | OXYGEN SATURATION: 100 % | DIASTOLIC BLOOD PRESSURE: 78 MMHG

## 2023-07-24 DIAGNOSIS — K90.9 MALABSORPTION SYNDROME: ICD-10-CM

## 2023-07-24 DIAGNOSIS — E53.8 FOLATE DEFICIENCY: ICD-10-CM

## 2023-07-24 DIAGNOSIS — D50.9 IRON DEFICIENCY ANEMIA, UNSPECIFIED IRON DEFICIENCY ANEMIA TYPE: ICD-10-CM

## 2023-07-24 DIAGNOSIS — Z98.84 STATUS POST BARIATRIC SURGERY: Primary | ICD-10-CM

## 2023-07-24 DIAGNOSIS — D50.0 IRON DEFICIENCY ANEMIA SECONDARY TO BLOOD LOSS (CHRONIC): ICD-10-CM

## 2023-07-24 DIAGNOSIS — E53.8 B12 DEFICIENCY: ICD-10-CM

## 2023-07-24 DIAGNOSIS — E53.8 B12 DEFICIENCY: Primary | ICD-10-CM

## 2023-07-24 DIAGNOSIS — D50.8 OTHER IRON DEFICIENCY ANEMIA: ICD-10-CM

## 2023-07-24 DIAGNOSIS — K21.9 GASTROESOPHAGEAL REFLUX DISEASE WITHOUT ESOPHAGITIS: ICD-10-CM

## 2023-07-24 DIAGNOSIS — Z86.2 HISTORY OF SARCOIDOSIS: ICD-10-CM

## 2023-07-24 PROBLEM — D64.9 ABSOLUTE ANEMIA: Status: ACTIVE | Noted: 2023-07-24

## 2023-07-24 PROCEDURE — 99214 OFFICE O/P EST MOD 30 MIN: CPT | Performed by: INTERNAL MEDICINE

## 2023-07-24 RX ORDER — CYANOCOBALAMIN 1000 UG/ML
1000 INJECTION, SOLUTION INTRAMUSCULAR; SUBCUTANEOUS ONCE
OUTPATIENT
Start: 2023-08-18 | End: 2023-08-18

## 2023-07-24 RX ORDER — SODIUM CHLORIDE 9 MG/ML
20 INJECTION, SOLUTION INTRAVENOUS ONCE
OUTPATIENT
Start: 2023-08-18

## 2023-07-24 RX ORDER — CYANOCOBALAMIN 1000 UG/ML
1000 INJECTION, SOLUTION INTRAMUSCULAR; SUBCUTANEOUS ONCE
Status: CANCELLED | OUTPATIENT
Start: 2023-08-04 | End: 2023-07-31

## 2023-07-24 RX ORDER — SODIUM CHLORIDE 9 MG/ML
20 INJECTION, SOLUTION INTRAVENOUS ONCE
Status: CANCELLED | OUTPATIENT
Start: 2023-07-31

## 2023-07-24 RX ORDER — CYANOCOBALAMIN 1000 UG/ML
1000 INJECTION, SOLUTION INTRAMUSCULAR; SUBCUTANEOUS ONCE
Status: CANCELLED | OUTPATIENT
Start: 2023-07-31 | End: 2023-07-31

## 2023-07-24 RX ORDER — SODIUM CHLORIDE 9 MG/ML
20 INJECTION, SOLUTION INTRAVENOUS ONCE
Status: CANCELLED | OUTPATIENT
Start: 2023-08-04

## 2023-07-24 NOTE — PROGRESS NOTES
HPI: Follow-up visit for  gastric sleeve surgery in [facility] in 2021 and she has gastric malabsorption and deficiency of iron and B12. She had lost 50 lb. She is gained some weight back. She also has sarcoidosis and she states that is in remission. She is going through change of life and menstrual period less frequent and lighter. She feels very tired. No chest pain, shortness of breath or palpitations. No other bleeding. She states she had EGD and colonoscopy within 3 years. Oral iron gives her constipation. Occasionally she has diarrhea. She has symptoms of GERD. She has an appointment with her gastroenterologist.        ROS:  07/24/23 Reviewed 12 systems: See symptoms in HPI  Presently no other neurological, cardiac, pulmonary, GI and  symptoms other than listed in HPI. Other symptoms are in HPI. No symptoms like fever, chills, active bleeding, bone pains, skin rash,  night sweats, arthritic symptoms,   numbness, claudication and gait problem. No frequent infections. Not unusually sensitive to heat or cold. No swelling of the ankles. No swollen glands. Patient is anxious. Physical Exam:  Vitals:    07/24/23 0809   BP: 118/78   BP Location: Left arm   Patient Position: Sitting   Cuff Size: Adult   Pulse: (!) 51   Resp: 18   Temp: 98 °F (36.7 °C)   TempSrc: Temporal   SpO2: 100%   Weight: 70.3 kg (155 lb)   Height: 5' 3" (1.6 m)     Patient is alert and oriented. Patient is not in distress. Vitals are above. When I checked her heart rate that was 62 and regular. There is no icterus. There is no oral thrush. There is no palpable neck mass. Lung fields are clear. Heart rate is regular. There is no palpable abdominal mass. Abdomen is soft and nontender. There is no ascites. There is no edema of ankles, no calf tenderness, no focal neurological deficit, no skin rash, no palpable lymphadenopathy in the neck and axillary areas,  no clubbing. Patient is anxious.   Performance status 1.  Historical Information   Past Medical History:   Diagnosis Date   • Anemia    • HLD (hyperlipidemia)    • Other hyperlipidemia 2022   • Palpitations 2021   • Sarcoidosis      Past Surgical History:   Procedure Laterality Date   • ANKLE SURGERY Right 2018   • COLONOSCOPY     • GASTRECTOMY SLEEVE LAPAROSCOPIC  2021   • KNEE ARTHROSCOPY W/ MENISCAL REPAIR Left    • TUBAL LIGATION     • UPPER GASTROINTESTINAL ENDOSCOPY       Social History   Social History     Substance and Sexual Activity   Alcohol Use Yes    Comment: socially     Social History     Substance and Sexual Activity   Drug Use Never     Social History     Tobacco Use   Smoking Status Former   • Packs/day: 0.25   • Years: 26.00   • Total pack years: 6.50   • Types: Cigarettes   • Start date:    • Quit date:    • Years since quittin.5   Smokeless Tobacco Never   Tobacco Comments    Patient was a on and off smoker     Family History:   Family History   Problem Relation Age of Onset   • Dementia Mother    • Seizures Mother    • Hypertension Mother    • Osteoporosis Mother    • Glaucoma Mother    • Macular degeneration Mother    • Hearing loss Mother    • Hyperlipidemia Mother    • No Known Problems Sister    • No Known Problems Daughter    • No Known Problems Maternal Grandmother    • No Known Problems Maternal Grandfather    • No Known Problems Daughter    • No Known Problems Maternal Aunt    • No Known Problems Father    • No Known Problems Paternal Grandmother    • No Known Problems Paternal Grandfather          Current Outpatient Medications:   •  famotidine (PEPCID) 20 mg tablet, Take 1 tablet (20 mg total) by mouth 2 (two) times a day, Disp: 180 tablet, Rfl: 3  •  BIOTIN PO, Take by mouth (Patient not taking: Reported on 2023), Disp: , Rfl:   •  Calcium Carbonate-Vit D-Min (Calcium 1200) 9363-5735 MG-UNIT CHEW, , Disp: , Rfl:   •  Cholecalciferol (D3 High Potency) 50 MCG ( UT) CAPS, , Disp: , Rfl: •  Ferrous Sulfate (FERRA PO), Take by mouth (Patient not taking: Reported on 7/24/2023), Disp: , Rfl:   •  folic acid (FOLVITE) 1 mg tablet, , Disp: , Rfl:   •  Multiple Vitamins-Minerals (MULTIVITAMIN ADULTS 50+ PO), Take by mouth in the morning (Patient not taking: Reported on 7/24/2023), Disp: , Rfl:     Allergies   Allergen Reactions   • Penicillin G Hives               Component Ref Range & Units 7/18/23  4:45 PM 11/9/22  2:19 PM 6/14/22 10:49 AM 5/6/22  9:22 AM 2/23/22  8:09 AM 4/5/21 10:39 AM   WBC 4.31 - 10.16 Thousand/uL 6.15  5.92  5.59  4.50  4.05 Low   5.51    RBC 3.81 - 5.12 Million/uL 5.01  4.97  5.17 High   5.28 High   4.42  4.99    Hemoglobin 11.5 - 15.4 g/dL 15.7 High   15.9 High   15.8 High   15.5 High   10.0 Low   11.9    Hematocrit 34.8 - 46.1 % 49.7 High   48.9 High   49.7 High   49.7 High   34.3 Low   41.3    MCV 82 - 98 fL 99 High   98  96  94  78 Low   83    MCH 26.8 - 34.3 pg 31.3  32.0  30.6  29.4  22.6 Low   23.8 Low     MCHC 31.4 - 37.4 g/dL 31.6  32.5  31.8  31.2 Low   29.2 Low   28.8 Low     RDW 11.6 - 15.1 % 13.9  13.6  17.7 High   24.0 High   18.6 High   18.8 High     MPV 8.9 - 12.7 fL 12.8 High   12.5  11.8   12.0  11.5    Platelets 699 - 692 Thousands/uL 171  196  175  187  294  221    nRBC /100 WBCs 0  0  0  0  0     Neutrophils Relative 43 - 75 % 57  57  61  55  53     Immat GRANS % 0 - 2 % 0  0  0  0  0     Lymphocytes Relative 14 - 44 % 33  34  30  36  38     Monocytes Relative 4 - 12 % 8  6  8  8  6     Eosinophils Relative 0 - 6 % 1  2  1  1  2     Basophils Relative 0 - 1 % 1  1  0  0  1     Neutrophils Absolute 1.85 - 7.62 Thousands/µL 3.51  3.39  3.38  2.46  2.13     Immature Grans Absolute 0.00 - 0.20 Thousand/uL 0.01  0.02  0.02  0.00  0.01     Lymphocytes Absolute 0.60 - 4.47 Thousands/µL 2.02  2.01  1.67  1.62  1.55     Monocytes Absolute 0.17 - 1.22 Thousand/µL 0.50  0.36  0.47  0.36  0.25     Eosinophils Absolute 0.00 - 0.61 Thousand/µL 0.08  0.11  0.03  0.04  0.09 Basophils Absolute 0.00 - 0.10 Thousands/µL 0.03  0.03  0.02  0.02  0.02                   Ferritin 11. Iron saturation 14%. B12 178          Lab Results: I have reviewed all pertinent labs. LABS:    Results for orders placed or performed in visit on 07/18/23   Angiotensin converting enzyme   Result Value Ref Range    Angio Convert Enzyme 44 14 - 82 U/L         Imaging Studies: I have personally reviewed pertinent reports. Pathology, and Other Studies: I have personally reviewed pertinent reports. Assessment and Plan:  See diagnoses, orders instructions below  Follow-up visit for  gastric sleeve surgery in [facility] in 2021 and she has gastric malabsorption and deficiency of iron and B12. She had lost 50 lb. She is gained some weight back. She also has sarcoidosis and she states that is in remission. She is going through change of life and menstrual period less frequent and lighter. She feels very tired. No chest pain, shortness of breath or palpitations. No other bleeding. She states she had EGD and colonoscopy within 3 years. Oral iron gives her constipation. Occasionally she has diarrhea. She has symptoms of GERD. She has an appointment with her gastroenterologist in August 2023. Negative Cologuard. .  Physical examination and test results are as recorded and discussed. Patient has deficiency of iron and B12. She had intravenous iron previously and that will be restarted. She  will have B12 injections. She is taking vitamin D3. Discussed the importance of self-breast examination, eating healthy foods, small portions more frequently, activities as tolerated and health screening test.  Patient is capable of self-care. Goal is to correct her iron and B12 deficiency. She will continue to follow with her primary physician and other consultants. She has an  appointment with her gastroenterologist.  Discussed precautions against coronavirus and other infections.   See diagnoses, orders and instructions. 1. Status post bariatric surgery    - CBC and differential; Future  - Ferritin; Future  - Vitamin B12; Future  - Reticulocytes; Future  - Folate; Future    2. Malabsorption syndrome    - CBC and differential; Future  - Ferritin; Future  - Vitamin B12; Future  - Reticulocytes; Future  - Folate; Future    3. B12 deficiency    - Vitamin B12; Future    4. Iron deficiency anemia, unspecified iron deficiency anemia type    - Ferritin; Future    5. Folate deficiency    - Folate; Future    6. Gastroesophageal reflux disease without esophagitis      7. History of sarcoidosis            Patient to start Venofer 200 mg intravenously once a week 4 times and once a month after that. B12 shots once a week for times and then once a month after that. Blood work prior to next visit in 2 months. Patient will continue to follow with her primary physician and other consultants. Patient voiced understanding and agrees     This note has been generated by voice recognition softener. Therefore there maybe spelling, grammar, and other mistakes. Please contact if questions arise. Counseling / Coordination of Care  . Ced Cruz   Provided counseling and support

## 2023-07-24 NOTE — TELEPHONE ENCOUNTER
Return in about 2 months (around 9/24/2023). Check out comments: Patient to start Venofer 200 mg intravenously once a week 4 times and once a month after that.  B12 shots once a week for times and then once a month after that.  Blood work prior to next visit in 2 months.      [facility] for both f/u and TX    [facility]    DURING THE WEEK AROUND LUNCH TIME IN [facility]    MAINLY SATURDAY ANYTIME IN [facility]

## 2023-07-24 NOTE — TELEPHONE ENCOUNTER
Spoke to patient directly and made her aware [facility] is no longer offering Saturday appointments for iron. 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 Per patient she will call me back when she is ready to schedule.

## 2023-07-24 NOTE — PATIENT INSTRUCTIONS
Patient to start Venofer 200 mg intravenously once a week 4 times and once a month after that. B12 shots once a week for times and then once a month after that. Blood work prior to next visit in 2 months.

## 2023-07-28 NOTE — TELEPHONE ENCOUNTER
[facility] message also sent to patient with [facility] infusion number to return there call to reschedule infusions.

## 2023-08-04 ENCOUNTER — HOSPITAL ENCOUNTER (OUTPATIENT)
Dept: INFUSION CENTER | Facility: CLINIC | Age: 53
End: 2023-08-04
Payer: COMMERCIAL

## 2023-08-04 VITALS
RESPIRATION RATE: 18 BRPM | TEMPERATURE: 97.4 F | DIASTOLIC BLOOD PRESSURE: 77 MMHG | HEART RATE: 49 BPM | SYSTOLIC BLOOD PRESSURE: 149 MMHG

## 2023-08-04 DIAGNOSIS — D50.8 OTHER IRON DEFICIENCY ANEMIA: ICD-10-CM

## 2023-08-04 DIAGNOSIS — E53.8 B12 DEFICIENCY: Primary | ICD-10-CM

## 2023-08-04 RX ORDER — SODIUM CHLORIDE 9 MG/ML
20 INJECTION, SOLUTION INTRAVENOUS ONCE
Status: CANCELLED | OUTPATIENT
Start: 2023-08-11

## 2023-08-04 RX ORDER — SODIUM CHLORIDE 9 MG/ML
20 INJECTION, SOLUTION INTRAVENOUS ONCE
Status: COMPLETED | OUTPATIENT
Start: 2023-08-04 | End: 2023-08-04

## 2023-08-04 RX ORDER — CYANOCOBALAMIN 1000 UG/ML
1000 INJECTION, SOLUTION INTRAMUSCULAR; SUBCUTANEOUS ONCE
Status: CANCELLED | OUTPATIENT
Start: 2023-08-11 | End: 2023-08-11

## 2023-08-04 RX ORDER — CYANOCOBALAMIN 1000 UG/ML
1000 INJECTION, SOLUTION INTRAMUSCULAR; SUBCUTANEOUS ONCE
Status: COMPLETED | OUTPATIENT
Start: 2023-08-04 | End: 2023-08-04

## 2023-08-04 RX ADMIN — SODIUM CHLORIDE 200 MG: 9 INJECTION, SOLUTION INTRAVENOUS at 15:20

## 2023-08-04 RX ADMIN — CYANOCOBALAMIN 1000 MCG: 1000 INJECTION, SOLUTION INTRAMUSCULAR at 15:25

## 2023-08-04 RX ADMIN — SODIUM CHLORIDE 20 ML/HR: 9 INJECTION, SOLUTION INTRAVENOUS at 15:18

## 2023-08-04 NOTE — PROGRESS NOTES
Pt presents for venofer and B12. No new meds or concerns. Pt tolerated treatment without adverse reaction, B12 administered in the [facility]. Future appointments discussed. AVS declined.

## 2023-08-11 ENCOUNTER — HOSPITAL ENCOUNTER (OUTPATIENT)
Dept: INFUSION CENTER | Facility: CLINIC | Age: 53
Discharge: HOME | End: 2023-08-11
Payer: COMMERCIAL

## 2023-08-11 VITALS
DIASTOLIC BLOOD PRESSURE: 71 MMHG | TEMPERATURE: 97.6 F | RESPIRATION RATE: 18 BRPM | HEART RATE: 46 BPM | SYSTOLIC BLOOD PRESSURE: 124 MMHG

## 2023-08-11 DIAGNOSIS — E53.8 B12 DEFICIENCY: Primary | ICD-10-CM

## 2023-08-11 DIAGNOSIS — D50.8 OTHER IRON DEFICIENCY ANEMIA: ICD-10-CM

## 2023-08-11 PROCEDURE — 96365 THER/PROPH/DIAG IV INF INIT: CPT

## 2023-08-11 PROCEDURE — 96372 THER/PROPH/DIAG INJ SC/IM: CPT

## 2023-08-11 RX ORDER — CYANOCOBALAMIN 1000 UG/ML
1000 INJECTION, SOLUTION INTRAMUSCULAR; SUBCUTANEOUS ONCE
Status: COMPLETED | OUTPATIENT
Start: 2023-08-11 | End: 2023-08-11

## 2023-08-11 RX ORDER — CYANOCOBALAMIN 1000 UG/ML
1000 INJECTION, SOLUTION INTRAMUSCULAR; SUBCUTANEOUS ONCE
Status: CANCELLED | OUTPATIENT
Start: 2023-08-18 | End: 2023-08-18

## 2023-08-11 RX ORDER — SODIUM CHLORIDE 9 MG/ML
20 INJECTION, SOLUTION INTRAVENOUS ONCE
Status: CANCELLED | OUTPATIENT
Start: 2023-08-18

## 2023-08-11 RX ORDER — SODIUM CHLORIDE 9 MG/ML
20 INJECTION, SOLUTION INTRAVENOUS ONCE
Status: COMPLETED | OUTPATIENT
Start: 2023-08-11 | End: 2023-08-11

## 2023-08-11 RX ADMIN — SODIUM CHLORIDE 20 ML/HR: 0.9 INJECTION, SOLUTION INTRAVENOUS at 13:49

## 2023-08-11 RX ADMIN — CYANOCOBALAMIN 1000 MCG: 1000 INJECTION, SOLUTION INTRAMUSCULAR at 13:51

## 2023-08-11 RX ADMIN — IRON SUCROSE 200 MG: 20 INJECTION, SOLUTION INTRAVENOUS at 13:50

## 2023-08-17 ENCOUNTER — TELEPHONE (OUTPATIENT)
Dept: VASCULAR SURGERY | Facility: CLINIC | Age: 53
End: 2023-08-17

## 2023-08-18 ENCOUNTER — HOSPITAL ENCOUNTER (OUTPATIENT)
Dept: INFUSION CENTER | Facility: CLINIC | Age: 53
End: 2023-08-18
Payer: COMMERCIAL

## 2023-08-18 DIAGNOSIS — E53.8 B12 DEFICIENCY: Primary | ICD-10-CM

## 2023-08-18 DIAGNOSIS — D50.8 OTHER IRON DEFICIENCY ANEMIA: ICD-10-CM

## 2023-08-18 PROCEDURE — 96365 THER/PROPH/DIAG IV INF INIT: CPT

## 2023-08-18 PROCEDURE — 96372 THER/PROPH/DIAG INJ SC/IM: CPT

## 2023-08-18 RX ORDER — SODIUM CHLORIDE 9 MG/ML
20 INJECTION, SOLUTION INTRAVENOUS ONCE
Status: CANCELLED | OUTPATIENT
Start: 2023-08-25

## 2023-08-18 RX ORDER — CYANOCOBALAMIN 1000 UG/ML
1000 INJECTION, SOLUTION INTRAMUSCULAR; SUBCUTANEOUS ONCE
Status: COMPLETED | OUTPATIENT
Start: 2023-08-18 | End: 2023-08-18

## 2023-08-18 RX ORDER — CYANOCOBALAMIN 1000 UG/ML
1000 INJECTION, SOLUTION INTRAMUSCULAR; SUBCUTANEOUS ONCE
Status: CANCELLED | OUTPATIENT
Start: 2023-08-25 | End: 2023-08-25

## 2023-08-18 RX ORDER — SODIUM CHLORIDE 9 MG/ML
20 INJECTION, SOLUTION INTRAVENOUS ONCE
Status: COMPLETED | OUTPATIENT
Start: 2023-08-18 | End: 2023-08-18

## 2023-08-18 RX ADMIN — SODIUM CHLORIDE 20 ML/HR: 9 INJECTION, SOLUTION INTRAVENOUS at 14:31

## 2023-08-18 RX ADMIN — SODIUM CHLORIDE 200 MG: 9 INJECTION, SOLUTION INTRAVENOUS at 14:35

## 2023-08-18 RX ADMIN — CYANOCOBALAMIN 1000 MCG: 1000 INJECTION, SOLUTION INTRAMUSCULAR at 14:30

## 2023-08-23 ENCOUNTER — CONSULT (OUTPATIENT)
Dept: GASTROENTEROLOGY | Facility: MEDICAL CENTER | Age: 53
End: 2023-08-23
Payer: COMMERCIAL

## 2023-08-23 ENCOUNTER — TELEPHONE (OUTPATIENT)
Dept: GASTROENTEROLOGY | Facility: MEDICAL CENTER | Age: 53
End: 2023-08-23

## 2023-08-23 ENCOUNTER — APPOINTMENT (OUTPATIENT)
Dept: LAB | Facility: MEDICAL CENTER | Age: 53
End: 2023-08-23

## 2023-08-23 VITALS
WEIGHT: 152.4 LBS | SYSTOLIC BLOOD PRESSURE: 137 MMHG | TEMPERATURE: 97.8 F | DIASTOLIC BLOOD PRESSURE: 80 MMHG | HEART RATE: 54 BPM | BODY MASS INDEX: 27 KG/M2

## 2023-08-23 DIAGNOSIS — Z00.8 HEALTH EXAMINATION IN POPULATION SURVEY: ICD-10-CM

## 2023-08-23 DIAGNOSIS — K59.04 CHRONIC IDIOPATHIC CONSTIPATION: Primary | ICD-10-CM

## 2023-08-23 DIAGNOSIS — K21.9 CHEST PAIN DUE TO GERD: ICD-10-CM

## 2023-08-23 DIAGNOSIS — R07.9 CHEST PAIN DUE TO GERD: ICD-10-CM

## 2023-08-23 DIAGNOSIS — K21.9 GASTROESOPHAGEAL REFLUX DISEASE WITHOUT ESOPHAGITIS: ICD-10-CM

## 2023-08-23 LAB
CHOLEST SERPL-MCNC: 191 MG/DL
EST. AVERAGE GLUCOSE BLD GHB EST-MCNC: 114 MG/DL
HBA1C MFR BLD: 5.6 %
HDLC SERPL-MCNC: 64 MG/DL
LDLC SERPL CALC-MCNC: 108 MG/DL (ref 0–100)
NONHDLC SERPL-MCNC: 127 MG/DL
TRIGL SERPL-MCNC: 97 MG/DL

## 2023-08-23 PROCEDURE — 83036 HEMOGLOBIN GLYCOSYLATED A1C: CPT

## 2023-08-23 PROCEDURE — 80061 LIPID PANEL: CPT

## 2023-08-23 PROCEDURE — 99215 OFFICE O/P EST HI 40 MIN: CPT | Performed by: INTERNAL MEDICINE

## 2023-08-23 PROCEDURE — 36415 COLL VENOUS BLD VENIPUNCTURE: CPT

## 2023-08-23 RX ORDER — LINACLOTIDE 145 UG/1
145 CAPSULE, GELATIN COATED ORAL DAILY
Qty: 14 CAPSULE | Refills: 0 | Status: SHIPPED | COMMUNITY
Start: 2023-08-23 | End: 2023-09-06

## 2023-08-23 RX ORDER — OMEPRAZOLE 40 MG/1
40 CAPSULE, DELAYED RELEASE ORAL DAILY
Qty: 30 CAPSULE | Refills: 3 | Status: SHIPPED | OUTPATIENT
Start: 2023-08-23 | End: 2023-09-22

## 2023-08-23 NOTE — PROGRESS NOTES
Outpatient Consultation  [address] [place] [address] Ramone Eckert  [address] [address]  Marcela Santiago M.D. Ph : [phone]  Fax : [phone]  Email : [email or web address]  Also available on [employer]      Yaa Do 46 y.o. female MRN: [id number]    PCP: Jyoti Alarcon MD  Referring: Jyoti Alarcon MD  [address] [place] [address]    Yaa Do was seen in consultation. My recommendations are included. Please do not hesitate to contact me with any questions you may have. ASSESSMENT AND PLAN:      No problem-specific Assessment & Plan notes found for this encounter. Diagnoses and all orders for this visit:    Chronic idiopathic constipation  -     linaCLOtide (Linzess) 145 MCG CAPS; Take 1 capsule (145 mcg total) by mouth daily for 14 days    Chest pain due to GERD  -     Ambulatory Referral to Gastroenterology    Gastroesophageal reflux disease without esophagitis  -     omeprazole (PriLOSEC) 40 MG capsule; Take 1 capsule (40 mg total) by mouth daily  -     EGD; Future    Other orders  -     Diet NPO; Sips with meds; Standing  -     Void on call to OR; Standing  -     Insert peripheral IV; Standing      1. GERD / post sleeve (2021). - recommend EGD for evaluation of esophagitis. - Recommend omeprazole 40 mg daily. Discontinue 10 days before the endoscopy  - Discussed STRETTA. - if the above therapies don't work we discussed a bypass but that would not be indicated unless the symptoms are significant. 2. Chronic constipation :   - increase water intake. - increase fiber (metamucil)  - Linzess 145 mcg daily - samples and if she tolerates then continue it. 3. Colonoscopy :   - had cologuard done last year and was unremarkable.    - repeat next visit.       ______________________________________________________________________    HPI:  46year old with history of sarcoidosis chronic idiopathic constipation (was treated with linzess in past), gastroesophageal reflux disease (was treated with PPI in the past) , iron deficiency anemia and a history of gastric sleeve (2021), who presents for a follow up evaluation for GERD symptoms. She has seen Dr. Vahid Bourne in the past.   She was scheduled for a colonoscopy in the past and did not tolerate the prep. She did the Cologuard and that was negative (11/22). She still has burning/ bloating/ burping and was taking omeprazole on/off for a number of years. She recently saw Dr. Rei Estevez recently and discussed with her that she has more reflux symptoms now with heartburn. She also has vomiting about 2 - 3 times/ day. For example yesterday she had dinner and then after that she had pain (epigastric) and burping about an hour later. She then take the pepcid and that helps at times. Symptoms more with certain foods - coffee and juice, etc.   She is on pepcid. She was on iron for the anemia. She was taking a MVT but has stopped now. She has had chronic constipation and may go 3 - 4 days without going to the bathroom. She does take miralax. This may or may not help. She can not say if the bloating is associated with it. Her stools are bristol 4. With the sleeve she has lost from 200 to 137lbs and now is at 152 lbs. She is exercising. Answers for HPI/ROS submitted by the patient on 8/22/2023  belching: Yes  nausea: Yes  vomiting: Yes  Aggravated by: belching      Family history of colon cancer : no    Family history of colon polyps : no    Family history of other gastrointestinal malignancy : no    PRIOR ENDOSCOPIC EVALUATION :     Endoscopy : yes    Colonoscopy : yes - [place] (was normal - >10 years). REVIEW OF SYSTEMS:    CONSTITUTIONAL: Denies any fever, chills, rigors, and weight loss. HEENT: No earache or tinnitus. Denies hearing loss or visual disturbances. CARDIOVASCULAR: No chest pain or palpitations.    RESPIRATORY: Denies any cough, hemoptysis, shortness of breath or dyspnea on exertion. GASTROINTESTINAL: As noted in the History of Present Illness. GENITOURINARY: No problems with urination. Denies any hematuria or dysuria. NEUROLOGIC: No dizziness or vertigo, denies headaches. MUSCULOSKELETAL: Denies any muscle or joint pain. SKIN: Denies skin rashes or itching. ENDOCRINE: Denies excessive thirst. Denies intolerance to heat or cold. PSYCHOSOCIAL: Denies depression or anxiety. Denies any recent memory loss.        Historical Information   Past Medical History:   Diagnosis Date   • Anemia    • HLD (hyperlipidemia)    • Other hyperlipidemia 2022   • Palpitations 2021   • Sarcoidosis      Past Surgical History:   Procedure Laterality Date   • ANKLE SURGERY Right 2018   • COLONOSCOPY     • GASTRECTOMY SLEEVE LAPAROSCOPIC  2021   • KNEE ARTHROSCOPY W/ MENISCAL REPAIR Left    • TUBAL LIGATION     • UPPER GASTROINTESTINAL ENDOSCOPY       Social History   Social History     Substance and Sexual Activity   Alcohol Use Yes    Comment: socially     Social History     Substance and Sexual Activity   Drug Use Never     Social History     Tobacco Use   Smoking Status Former   • Packs/day: 0.25   • Years: 26.00   • Total pack years: 6.50   • Types: Cigarettes   • Start date:    • Quit date:    • Years since quittin.6   Smokeless Tobacco Never   Tobacco Comments    Patient was a on and off smoker     Family History   Problem Relation Age of Onset   • Dementia Mother    • Seizures Mother    • Hypertension Mother    • Osteoporosis Mother    • Glaucoma Mother    • Macular degeneration Mother    • Hearing loss Mother    • Hyperlipidemia Mother    • No Known Problems Sister    • No Known Problems Daughter    • No Known Problems Maternal Grandmother    • No Known Problems Maternal Grandfather    • No Known Problems Daughter    • No Known Problems Maternal Aunt    • No Known Problems Father    • No Known Problems Paternal Grandmother    • No Known Problems Paternal Grandfather        Meds/Allergies       Current Outpatient Medications:   •  famotidine (PEPCID) 20 mg tablet  •  linaCLOtide (Linzess) 145 MCG CAPS  •  omeprazole (PriLOSEC) 40 MG capsule  •  BIOTIN PO  •  Calcium Carbonate-Vit D-Min (Calcium 1200) 4106-0755 MG-UNIT CHEW  •  Cholecalciferol (D3 High Potency) 50 MCG (2000 UT) CAPS  •  Ferrous Sulfate (FERRA PO)  •  folic acid (FOLVITE) 1 mg tablet  •  Multiple Vitamins-Minerals (MULTIVITAMIN ADULTS 50+ PO)    Allergies   Allergen Reactions   • Penicillin G Hives           Objective     Blood pressure 137/80, pulse (!) 54, temperature 97.8 °F (36.6 °C), temperature source Tympanic, weight 69.1 kg (152 lb 6.4 oz). Body mass index is 27 kg/m². PHYSICAL EXAM:      Physical Exam  Constitutional:       Appearance: Normal appearance. She is well-developed. HENT:      Head: Normocephalic and atraumatic. Eyes:      General: No scleral icterus. Conjunctiva/sclera: Conjunctivae normal.      Pupils: Pupils are equal, round, and reactive to light. Cardiovascular:      Rate and Rhythm: Normal rate and regular rhythm. Heart sounds: Normal heart sounds. Pulmonary:      Effort: Pulmonary effort is normal. No respiratory distress. Breath sounds: Normal breath sounds. Abdominal:      General: Bowel sounds are normal. There is no distension. Palpations: Abdomen is soft. There is no mass. Tenderness: There is no abdominal tenderness. Hernia: No hernia is present. Musculoskeletal:         General: Normal range of motion. Cervical back: Normal range of motion. Lymphadenopathy:      Cervical: No cervical adenopathy. Skin:     General: Skin is warm. Neurological:      Mental Status: She is alert and oriented to person, place, and time. Psychiatric:         Behavior: Behavior normal.         Thought Content:  Thought content normal.             Lab Results:     Lab Results Component Value Date    WBC 6.15 07/18/2023    HGB 15.7 (H) 07/18/2023    HCT 49.7 (H) 07/18/2023    MCV 99 (H) 07/18/2023     07/18/2023       Lab Results   Component Value Date    K 4.2 02/23/2022     02/23/2022    CO2 27 02/23/2022    BUN 14 02/23/2022    CREATININE 0.86 02/23/2022    GLUF 86 02/23/2022    CALCIUM 8.8 02/23/2022    AST 12 02/23/2022    ALT 18 02/23/2022    ALKPHOS 67 02/23/2022    EGFR 77 02/23/2022       Lab Results   Component Value Date    INR 1.11 04/05/2021    PROTIME 14.1 04/05/2021         Radiology Results:   No results found.

## 2023-08-25 ENCOUNTER — HOSPITAL ENCOUNTER (OUTPATIENT)
Dept: INFUSION CENTER | Facility: CLINIC | Age: 53
End: 2023-08-25
Payer: COMMERCIAL

## 2023-08-25 VITALS
RESPIRATION RATE: 18 BRPM | TEMPERATURE: 97.9 F | DIASTOLIC BLOOD PRESSURE: 71 MMHG | SYSTOLIC BLOOD PRESSURE: 130 MMHG | HEART RATE: 46 BPM

## 2023-08-25 DIAGNOSIS — E53.8 B12 DEFICIENCY: Primary | ICD-10-CM

## 2023-08-25 DIAGNOSIS — D50.8 OTHER IRON DEFICIENCY ANEMIA: ICD-10-CM

## 2023-08-25 PROCEDURE — 96365 THER/PROPH/DIAG IV INF INIT: CPT

## 2023-08-25 PROCEDURE — 96372 THER/PROPH/DIAG INJ SC/IM: CPT

## 2023-08-25 RX ORDER — SODIUM CHLORIDE 9 MG/ML
20 INJECTION, SOLUTION INTRAVENOUS ONCE
Status: COMPLETED | OUTPATIENT
Start: 2023-08-25 | End: 2023-08-25

## 2023-08-25 RX ORDER — CYANOCOBALAMIN 1000 UG/ML
1000 INJECTION, SOLUTION INTRAMUSCULAR; SUBCUTANEOUS ONCE
Status: COMPLETED | OUTPATIENT
Start: 2023-08-25 | End: 2023-08-25

## 2023-08-25 RX ORDER — SODIUM CHLORIDE 9 MG/ML
20 INJECTION, SOLUTION INTRAVENOUS ONCE
Status: CANCELLED | OUTPATIENT
Start: 2023-09-01

## 2023-08-25 RX ORDER — CYANOCOBALAMIN 1000 UG/ML
1000 INJECTION, SOLUTION INTRAMUSCULAR; SUBCUTANEOUS ONCE
Status: CANCELLED | OUTPATIENT
Start: 2023-09-01 | End: 2023-09-01

## 2023-08-25 RX ADMIN — CYANOCOBALAMIN 1000 MCG: 1000 INJECTION, SOLUTION INTRAMUSCULAR at 14:40

## 2023-08-25 RX ADMIN — SODIUM CHLORIDE 20 ML/HR: 0.9 INJECTION, SOLUTION INTRAVENOUS at 14:44

## 2023-08-25 RX ADMIN — IRON SUCROSE 200 MG: 20 INJECTION, SOLUTION INTRAVENOUS at 14:45

## 2023-08-25 NOTE — PROGRESS NOTES
Pt tolerated venofer infusion without incident. Pt declined AVS but did make future appt before leaving infusion center.

## 2023-09-13 ENCOUNTER — TELEPHONE (OUTPATIENT)
Dept: GASTROENTEROLOGY | Facility: MEDICAL CENTER | Age: 53
End: 2023-09-13

## 2023-09-13 NOTE — TELEPHONE ENCOUNTER
Left message for patient to call office if she is interested to have sooner EGD date for 09/14/2023 with Dr. Jessika Petit @ [facility].

## 2023-09-14 ENCOUNTER — TELEPHONE (OUTPATIENT)
Dept: VASCULAR SURGERY | Facility: CLINIC | Age: 53
End: 2023-09-14

## 2023-09-28 RX ORDER — SODIUM CHLORIDE 9 MG/ML
125 INJECTION, SOLUTION INTRAVENOUS CONTINUOUS
Status: CANCELLED | OUTPATIENT
Start: 2023-09-28

## 2023-09-29 ENCOUNTER — TELEPHONE (OUTPATIENT)
Dept: INFUSION CENTER | Facility: CLINIC | Age: 53
End: 2023-09-29

## 2023-09-29 ENCOUNTER — HOSPITAL ENCOUNTER (OUTPATIENT)
Dept: INFUSION CENTER | Facility: CLINIC | Age: 53
End: 2023-09-29
Payer: COMMERCIAL

## 2023-09-29 VITALS
DIASTOLIC BLOOD PRESSURE: 82 MMHG | SYSTOLIC BLOOD PRESSURE: 138 MMHG | HEART RATE: 49 BPM | TEMPERATURE: 96.5 F | RESPIRATION RATE: 18 BRPM

## 2023-09-29 DIAGNOSIS — D50.0 IRON DEFICIENCY ANEMIA SECONDARY TO BLOOD LOSS (CHRONIC): Primary | ICD-10-CM

## 2023-09-29 DIAGNOSIS — D50.9 IRON DEFICIENCY ANEMIA, UNSPECIFIED IRON DEFICIENCY ANEMIA TYPE: ICD-10-CM

## 2023-09-29 DIAGNOSIS — D50.8 OTHER IRON DEFICIENCY ANEMIA: ICD-10-CM

## 2023-09-29 DIAGNOSIS — K90.9 MALABSORPTION SYNDROME: ICD-10-CM

## 2023-09-29 PROCEDURE — 96365 THER/PROPH/DIAG IV INF INIT: CPT

## 2023-09-29 PROCEDURE — 96372 THER/PROPH/DIAG INJ SC/IM: CPT

## 2023-09-29 RX ORDER — CYANOCOBALAMIN 1000 UG/ML
1000 INJECTION, SOLUTION INTRAMUSCULAR; SUBCUTANEOUS ONCE
OUTPATIENT
Start: 2023-10-27 | End: 2023-10-27

## 2023-09-29 RX ORDER — SODIUM CHLORIDE 9 MG/ML
20 INJECTION, SOLUTION INTRAVENOUS ONCE
OUTPATIENT
Start: 2023-10-27

## 2023-09-29 RX ORDER — SODIUM CHLORIDE 9 MG/ML
20 INJECTION, SOLUTION INTRAVENOUS ONCE
Status: COMPLETED | OUTPATIENT
Start: 2023-09-29 | End: 2023-09-29

## 2023-09-29 RX ORDER — CYANOCOBALAMIN 1000 UG/ML
1000 INJECTION, SOLUTION INTRAMUSCULAR; SUBCUTANEOUS ONCE
Status: COMPLETED | OUTPATIENT
Start: 2023-09-29 | End: 2023-09-29

## 2023-09-29 RX ADMIN — SODIUM CHLORIDE 20 ML/HR: 9 INJECTION, SOLUTION INTRAVENOUS at 14:49

## 2023-09-29 RX ADMIN — IRON SUCROSE 200 MG: 20 INJECTION, SOLUTION INTRAVENOUS at 14:49

## 2023-09-29 RX ADMIN — CYANOCOBALAMIN 1000 MCG: 1000 INJECTION, SOLUTION INTRAMUSCULAR at 14:44

## 2023-10-02 ENCOUNTER — ANESTHESIA EVENT (OUTPATIENT)
Dept: GASTROENTEROLOGY | Facility: MEDICAL CENTER | Age: 53
End: 2023-10-02

## 2023-10-02 ENCOUNTER — HOSPITAL ENCOUNTER (OUTPATIENT)
Dept: GASTROENTEROLOGY | Facility: MEDICAL CENTER | Age: 53
Setting detail: OUTPATIENT SURGERY
Discharge: HOME | End: 2023-10-02
Attending: INTERNAL MEDICINE | Admitting: INTERNAL MEDICINE
Payer: COMMERCIAL

## 2023-10-02 ENCOUNTER — ANESTHESIA (OUTPATIENT)
Dept: GASTROENTEROLOGY | Facility: MEDICAL CENTER | Age: 53
End: 2023-10-02

## 2023-10-02 VITALS
WEIGHT: 152.34 LBS | TEMPERATURE: 97.4 F | DIASTOLIC BLOOD PRESSURE: 56 MMHG | HEART RATE: 62 BPM | HEIGHT: 63 IN | RESPIRATION RATE: 16 BRPM | BODY MASS INDEX: 26.99 KG/M2 | SYSTOLIC BLOOD PRESSURE: 101 MMHG | OXYGEN SATURATION: 97 %

## 2023-10-02 DIAGNOSIS — K21.9 GASTROESOPHAGEAL REFLUX DISEASE WITHOUT ESOPHAGITIS: ICD-10-CM

## 2023-10-02 LAB
EXT PREGNANCY TEST URINE: NEGATIVE
EXT. CONTROL: NORMAL

## 2023-10-02 PROCEDURE — 88305 TISSUE EXAM BY PATHOLOGIST: CPT | Performed by: PATHOLOGY

## 2023-10-02 PROCEDURE — 81025 URINE PREGNANCY TEST: CPT | Performed by: ANESTHESIOLOGY

## 2023-10-02 RX ORDER — OMEPRAZOLE 40 MG/1
40 CAPSULE, DELAYED RELEASE ORAL DAILY
Qty: 30 CAPSULE | Refills: 3 | Status: SHIPPED | OUTPATIENT
Start: 2023-10-02 | End: 2024-01-30

## 2023-10-02 RX ORDER — PROPOFOL 10 MG/ML
INJECTION, EMULSION INTRAVENOUS AS NEEDED
Status: DISCONTINUED | OUTPATIENT
Start: 2023-10-02 | End: 2023-10-02

## 2023-10-02 RX ORDER — SODIUM CHLORIDE 9 MG/ML
125 INJECTION, SOLUTION INTRAVENOUS CONTINUOUS
Status: DISCONTINUED | OUTPATIENT
Start: 2023-10-02 | End: 2023-10-06 | Stop reason: HOSPADM

## 2023-10-02 RX ORDER — LIDOCAINE HYDROCHLORIDE 20 MG/ML
INJECTION, SOLUTION EPIDURAL; INFILTRATION; INTRACAUDAL; PERINEURAL AS NEEDED
Status: DISCONTINUED | OUTPATIENT
Start: 2023-10-02 | End: 2023-10-02

## 2023-10-02 RX ADMIN — PROPOFOL 130 MG: 10 INJECTION, EMULSION INTRAVENOUS at 11:35

## 2023-10-02 RX ADMIN — SODIUM CHLORIDE 125 ML/HR: 0.9 INJECTION, SOLUTION INTRAVENOUS at 11:24

## 2023-10-02 RX ADMIN — LIDOCAINE HYDROCHLORIDE 5 ML: 20 INJECTION, SOLUTION EPIDURAL; INFILTRATION; INTRACAUDAL at 11:35

## 2023-10-02 RX ADMIN — PROPOFOL 50 MG: 10 INJECTION, EMULSION INTRAVENOUS at 11:40

## 2023-10-02 NOTE — ANESTHESIA PREPROCEDURE EVALUATION
Procedure:  EGD    Relevant Problems   CARDIO   (+) Chest pain due to GERD      GI/HEPATIC   (+) Gastroesophageal reflux disease without esophagitis      HEMATOLOGY   (+) Absolute anemia   (+) Iron deficiency anemia   (+) Iron deficiency anemia secondary to blood loss (chronic)      NEURO/PSYCH   (+) Anxiety disorder        Physical Exam    Airway    Mallampati score: II         Dental   No notable dental hx     Cardiovascular  Cardiovascular exam normal    Pulmonary  Pulmonary exam normal     Other Findings        Anesthesia Plan  ASA Score- 2     Anesthesia Type- IV sedation with anesthesia with ASA Monitors. Additional Monitors:   Airway Plan:           Plan Factors-Exercise tolerance (METS): >4 METS. Chart reviewed. Patient is not a current smoker. Patient instructed to abstain from smoking on day of procedure. Patient did not smoke on day of surgery. Obstructive sleep apnea risk education given perioperatively. Induction- intravenous. Postoperative Plan-     Informed Consent- Anesthetic plan and risks discussed with patient.

## 2023-10-02 NOTE — H&P
History and Physical - [facility]  Yennifer Womack 46 y.o. female MRN: [id number]    HPI: Yennifer Womack is a 46y.o. year old female who presents with GERD h/o sleeve gastroplasty.        Review of Systems    Historical Information   Past Medical History:   Diagnosis Date   • Anemia    • HLD (hyperlipidemia)    • Other hyperlipidemia 2022   • Palpitations 2021   • Sarcoidosis      Past Surgical History:   Procedure Laterality Date   • ANKLE SURGERY Right 2018   • COLONOSCOPY     • GASTRECTOMY SLEEVE LAPAROSCOPIC  2021   • KNEE ARTHROSCOPY W/ MENISCAL REPAIR Left    • TUBAL LIGATION     • UPPER GASTROINTESTINAL ENDOSCOPY       Social History   Social History     Substance and Sexual Activity   Alcohol Use Yes    Comment: socially     Social History     Substance and Sexual Activity   Drug Use Never     Social History     Tobacco Use   Smoking Status Former   • Packs/day: 0.25   • Years: 26.00   • Total pack years: 6.50   • Types: Cigarettes   • Start date:    • Quit date:    • Years since quittin.7   Smokeless Tobacco Never   Tobacco Comments    Patient was a on and off smoker     Family History   Problem Relation Age of Onset   • Dementia Mother    • Seizures Mother    • Hypertension Mother    • Osteoporosis Mother    • Glaucoma Mother    • Macular degeneration Mother    • Hearing loss Mother    • Hyperlipidemia Mother    • No Known Problems Sister    • No Known Problems Daughter    • No Known Problems Maternal Grandmother    • No Known Problems Maternal Grandfather    • No Known Problems Daughter    • No Known Problems Maternal Aunt    • No Known Problems Father    • No Known Problems Paternal Grandmother    • No Known Problems Paternal Grandfather        Meds/Allergies     (Not in a hospital admission)      Allergies   Allergen Reactions   • Penicillin G Hives       Objective     /73   Pulse (!) 49   Temp (!) 97.4 °F (36.3 °C) (Temporal) Resp 15   Ht 5' 3" (1.6 m)   Wt 69.1 kg (152 lb 5.4 oz)   SpO2 99%   BMI 26.99 kg/m²       PHYSICAL EXAM    Gen: NAD  CV: RRR  CHEST: Clear  ABD: soft, NT/ND  EXT: no edema  Neuro: AAO      ASSESSMENT/PLAN:  This is a 46y.o. year old female here for EGD for follow up of GERD and h/o sleeve.      PLAN:   Procedure: EGD

## 2023-10-02 NOTE — ANESTHESIA POSTPROCEDURE EVALUATION
Post-Op Assessment Note    CV Status:  Stable    Pain management: adequate     Mental Status:  Alert and awake   Hydration Status:  Euvolemic   PONV Controlled:  Controlled   Airway Patency:  Patent      Post Op Vitals Reviewed: Yes            No notable events documented.     BP      Temp     Pulse     Resp      SpO2      /56   Pulse 62   Temp (!) 97.4 °F (36.3 °C) (Temporal)   Resp 16   Ht 5' 3" (1.6 m)   Wt 69.1 kg (152 lb 5.4 oz)   SpO2 97%   BMI 26.99 kg/m²

## 2023-10-05 PROCEDURE — 88305 TISSUE EXAM BY PATHOLOGIST: CPT | Performed by: PATHOLOGY

## 2023-10-05 NOTE — RESULT ENCOUNTER NOTE
Inform patient via [employer]. Please review the pathology/lab result of further discussion. Copied from [employer] message :       [address],     Biopsies were unremarkable.      Best regards,     Olga Knowles MD

## 2023-10-09 ENCOUNTER — PREP FOR PROCEDURE (OUTPATIENT)
Dept: GASTROENTEROLOGY | Facility: CLINIC | Age: 53
End: 2023-10-09

## 2023-10-09 DIAGNOSIS — K21.9 CHEST PAIN DUE TO GERD: ICD-10-CM

## 2023-10-09 DIAGNOSIS — R07.9 CHEST PAIN DUE TO GERD: ICD-10-CM

## 2023-10-09 DIAGNOSIS — K21.9 GASTROESOPHAGEAL REFLUX DISEASE WITHOUT ESOPHAGITIS: Primary | ICD-10-CM

## 2023-10-19 ENCOUNTER — TELEPHONE (OUTPATIENT)
Dept: GASTROENTEROLOGY | Facility: HOSPITAL | Age: 53
End: 2023-10-19

## 2023-10-27 ENCOUNTER — HOSPITAL ENCOUNTER (OUTPATIENT)
Dept: INFUSION CENTER | Facility: CLINIC | Age: 53
Discharge: HOME | End: 2023-10-27
Payer: COMMERCIAL

## 2023-10-27 VITALS
RESPIRATION RATE: 18 BRPM | DIASTOLIC BLOOD PRESSURE: 72 MMHG | SYSTOLIC BLOOD PRESSURE: 126 MMHG | HEART RATE: 52 BPM | TEMPERATURE: 97.6 F

## 2023-10-27 DIAGNOSIS — D50.8 OTHER IRON DEFICIENCY ANEMIA: ICD-10-CM

## 2023-10-27 DIAGNOSIS — K90.9 MALABSORPTION SYNDROME: ICD-10-CM

## 2023-10-27 DIAGNOSIS — D50.9 IRON DEFICIENCY ANEMIA, UNSPECIFIED IRON DEFICIENCY ANEMIA TYPE: Primary | ICD-10-CM

## 2023-10-27 DIAGNOSIS — D50.0 IRON DEFICIENCY ANEMIA SECONDARY TO BLOOD LOSS (CHRONIC): ICD-10-CM

## 2023-10-27 PROCEDURE — 96372 THER/PROPH/DIAG INJ SC/IM: CPT

## 2023-10-27 PROCEDURE — 96365 THER/PROPH/DIAG IV INF INIT: CPT

## 2023-10-27 RX ORDER — CYANOCOBALAMIN 1000 UG/ML
1000 INJECTION, SOLUTION INTRAMUSCULAR; SUBCUTANEOUS ONCE
Status: COMPLETED | OUTPATIENT
Start: 2023-10-27 | End: 2023-10-27

## 2023-10-27 RX ORDER — SODIUM CHLORIDE 9 MG/ML
20 INJECTION, SOLUTION INTRAVENOUS ONCE
Status: COMPLETED | OUTPATIENT
Start: 2023-10-27 | End: 2023-10-27

## 2023-10-27 RX ORDER — SODIUM CHLORIDE 9 MG/ML
20 INJECTION, SOLUTION INTRAVENOUS ONCE
OUTPATIENT
Start: 2023-11-24

## 2023-10-27 RX ORDER — CYANOCOBALAMIN 1000 UG/ML
1000 INJECTION, SOLUTION INTRAMUSCULAR; SUBCUTANEOUS ONCE
OUTPATIENT
Start: 2023-11-24 | End: 2023-11-24

## 2023-10-27 RX ADMIN — CYANOCOBALAMIN 1000 MCG: 1000 INJECTION, SOLUTION INTRAMUSCULAR at 14:52

## 2023-10-27 RX ADMIN — SODIUM CHLORIDE 20 ML/HR: 0.9 INJECTION, SOLUTION INTRAVENOUS at 14:46

## 2023-10-27 RX ADMIN — IRON SUCROSE 200 MG: 20 INJECTION, SOLUTION INTRAVENOUS at 14:50

## 2023-10-30 ENCOUNTER — HOSPITAL ENCOUNTER (OUTPATIENT)
Dept: GASTROENTEROLOGY | Facility: HOSPITAL | Age: 53
Discharge: HOME | End: 2023-10-30
Payer: COMMERCIAL

## 2023-10-30 VITALS
SYSTOLIC BLOOD PRESSURE: 152 MMHG | HEART RATE: 54 BPM | OXYGEN SATURATION: 95 % | DIASTOLIC BLOOD PRESSURE: 68 MMHG | TEMPERATURE: 97.5 F | RESPIRATION RATE: 16 BRPM

## 2023-10-30 DIAGNOSIS — K21.9 CHEST PAIN DUE TO GERD: ICD-10-CM

## 2023-10-30 DIAGNOSIS — R07.9 CHEST PAIN DUE TO GERD: ICD-10-CM

## 2023-10-30 DIAGNOSIS — K21.9 GASTROESOPHAGEAL REFLUX DISEASE WITHOUT ESOPHAGITIS: ICD-10-CM

## 2023-10-30 PROCEDURE — 91010 ESOPHAGUS MOTILITY STUDY: CPT

## 2023-10-30 PROCEDURE — 91038 ESOPH IMPED FUNCT TEST > 1HR: CPT

## 2023-10-30 RX ADMIN — TOPICAL ANESTHETIC 2 SPRAY: 200 SPRAY DENTAL; PERIODONTAL at 08:19

## 2023-10-30 NOTE — PERIOPERATIVE NURSING NOTE
Patient brought in the room and explained the esophageal manometry procedure. After the confirmation of allergies, hurricaine one spray given via oral cavity  and  a transnasal insertion of the High Resolution esophageal manometry catheter was inserted via left nostril. Patient given water to drink during the insertion and once the catheter inserted pressure bands of both Upper esophageal sphincter  (UES) and Lower esophageal sphincter ( LES) were observed on the color contour. Patient instructed to take a deep breath to verify placement of the catheter, diaphragmatic pinch noted on inspiration. Catheter was secured to left cheek. Patient was assisted to supine position . Patient was instructed to relax  while acclimating the catheter for about 5 minutes. A 30 second baseline resting pressure was obtained to identify the UES and LES followed by a series of 8 liquid swallows using 5 cc room temperature normal saline to assess esophageal motility and bolus transit. Patient administered 10 viscous swallows using 5 cc viscous solution, 1 multiple rapid drink swallow using 2 cc room temperature normal saline given a total of 5 drinks. Patient instructed to sit up at the edge of the stretcher and given 5 upright liquid swallows using 5 cc room temperature normal saline and 1 rapid drink challenge using 40 cc room temperature water. At the end of the procedure the high resolution esophageal manometry catheter was removed from the nostril intact. sensor PH probe inserted via left nostril and secured. Zephr recorder teachback performed and patient verbalized understanding. Patient instructed to return next day to have probe remove. Discharge instructions given and patient ambulated out of room in stable condition.

## 2023-11-01 NOTE — RESULT ENCOUNTER NOTE
Inform patient via [email or web address]. Please review the pathology/lab result of further discussion. Copied from [email or web address] message :       [address],     Your ph study did not show any significant reflux but was mildly positive. The motility study was normal. May consider STRETTA. Follow up in office.      Best regards,     Caleb Rivera MD

## 2023-11-16 DIAGNOSIS — D50.8 OTHER IRON DEFICIENCY ANEMIA: Primary | ICD-10-CM

## 2023-11-19 DIAGNOSIS — E53.8 B12 DEFICIENCY: Primary | ICD-10-CM

## 2023-11-20 ENCOUNTER — TELEPHONE (OUTPATIENT)
Dept: HEMATOLOGY ONCOLOGY | Facility: CLINIC | Age: 53
End: 2023-11-20

## 2023-11-20 DIAGNOSIS — D50.8 OTHER IRON DEFICIENCY ANEMIA: Primary | ICD-10-CM

## 2023-11-20 NOTE — TELEPHONE ENCOUNTER
----- Message from Evelin Wade MD sent at 11/18/2023  3:32 PM EST -----  Brooks Barbour,  Please also order CBCD and vitamin B12. Please give patient an appointment in the office within 2 months. Her B12 level is low and she needs B12 injections or pills if she has not been taking it. Please check with the patient. Diagnosis: B12 deficiency. Iron deficiency anemia.   Thank you  Proothi

## 2023-11-20 NOTE — TELEPHONE ENCOUNTER
Left VM for patient to advise that she needs an iron panel collected to obtain authorization for her iron infusions.     Also made her aware of the below from Dr. Ami Krishna

## 2023-11-21 ENCOUNTER — OFFICE VISIT (OUTPATIENT)
Age: 53
End: 2023-11-21
Payer: COMMERCIAL

## 2023-11-21 VITALS
WEIGHT: 151 LBS | DIASTOLIC BLOOD PRESSURE: 72 MMHG | OXYGEN SATURATION: 99 % | TEMPERATURE: 96.8 F | SYSTOLIC BLOOD PRESSURE: 116 MMHG | HEIGHT: 63 IN | HEART RATE: 48 BPM | BODY MASS INDEX: 26.75 KG/M2

## 2023-11-21 DIAGNOSIS — Z98.84 STATUS POST BARIATRIC SURGERY: ICD-10-CM

## 2023-11-21 DIAGNOSIS — N95.2 PERIMENOPAUSAL ATROPHIC VAGINITIS: Primary | ICD-10-CM

## 2023-11-21 DIAGNOSIS — D50.0 IRON DEFICIENCY ANEMIA SECONDARY TO BLOOD LOSS (CHRONIC): ICD-10-CM

## 2023-11-21 DIAGNOSIS — E53.8 FOLATE DEFICIENCY: ICD-10-CM

## 2023-11-21 DIAGNOSIS — I83.813 VARICOSE VEINS OF BOTH LOWER EXTREMITIES WITH PAIN: ICD-10-CM

## 2023-11-21 DIAGNOSIS — K21.9 GASTROESOPHAGEAL REFLUX DISEASE WITHOUT ESOPHAGITIS: ICD-10-CM

## 2023-11-21 DIAGNOSIS — N95.1 HOT FLASHES DUE TO MENOPAUSE: ICD-10-CM

## 2023-11-21 PROCEDURE — 99214 OFFICE O/P EST MOD 30 MIN: CPT | Performed by: INTERNAL MEDICINE

## 2023-11-21 NOTE — ASSESSMENT & PLAN NOTE
Presently on parenteral iron transfusions due to intolerance to oral iron.   Energy levels have improved as compared to previously

## 2023-11-21 NOTE — ASSESSMENT & PLAN NOTE
Continue supplementation with famotidine 20 mg twice daily.   She does not take a PPI regularly but only takes it as needed

## 2023-11-21 NOTE — ASSESSMENT & PLAN NOTE
Possibly related to scar tissue with previous history of multiple surgeries.   Has seen some improvement with Linzess 145 mcg daily

## 2023-11-21 NOTE — PROGRESS NOTES
Name: Roxann Brown      : 1970      MRN: [id number]  Encounter Provider: Dakotah Camp MD  Encounter Date: 2023   Encounter department: [facility]     1. Perimenopausal atrophic vaginitis  -     Ambulatory Referral to Gynecology; Future    2. Hot flashes due to menopause  -     Ambulatory Referral to Gynecology; Future    3. Iron deficiency anemia secondary to blood loss (chronic)  Assessment & Plan:  Presently on parenteral iron transfusions due to intolerance to oral iron. Energy levels have improved as compared to previously      4. Gastroesophageal reflux disease without esophagitis  Assessment & Plan:  Continue supplementation with famotidine 20 mg twice daily. She does not take a PPI regularly but only takes it as needed      5. Folate deficiency  Assessment & Plan: On supplementation due to malabsorption      6. Status post bariatric surgery    7. Varicose veins of both lower extremities with pain  Assessment & Plan:  Recommend compression wear to help with lower extremity swelling and congestion           Subjective     Chief Complaint   Patient presents with   • Follow-up     4 month follow up      HPI    Heide Barajas is here today for her follow-up visit. She seems a little bit better as compared to her last visit. She has been following up with Dr. Tatiana Maurer with gastroenterology and has had a pH testing which does document that she has overnight gastric reflux with the acid reflux that was seen to be present overnight study. Also her iron has been discontinued and she is now getting parenteral iron along with B12 once a month   Energy level seems to be improving as compared to before. Is also taking Linzess to help with her chronic constipation secondary to scarring and that seems to be helping her as well.   C/O hot flashes and also severe dryness in the vaginal area and would like to see a specialist for that as well    Review of Systems   HENT:  Positive for postnasal drip. Cardiovascular:  Positive for leg swelling. Gastrointestinal:  Positive for abdominal pain, constipation and nausea. GERD   Genitourinary:  Positive for dyspareunia. Vaginal dryness   Musculoskeletal:  Positive for arthralgias. Allergic/Immunologic: Positive for environmental allergies. Psychiatric/Behavioral:  Positive for dysphoric mood. Nervous/anxious: gyn.         Past Medical History:   Diagnosis Date   • Anemia    • HLD (hyperlipidemia)    • Other hyperlipidemia 2022   • Palpitations 2021   • Sarcoidosis      Past Surgical History:   Procedure Laterality Date   • ANKLE SURGERY Right 2018   • COLONOSCOPY  2018   • GASTRECTOMY SLEEVE LAPAROSCOPIC  2021   • KNEE ARTHROSCOPY W/ MENISCAL REPAIR Left    • TUBAL LIGATION     • UPPER GASTROINTESTINAL ENDOSCOPY       Family History   Problem Relation Age of Onset   • Dementia Mother    • Seizures Mother    • Hypertension Mother    • Osteoporosis Mother    • Glaucoma Mother    • Macular degeneration Mother    • Hearing loss Mother    • Hyperlipidemia Mother    • No Known Problems Sister    • No Known Problems Daughter    • No Known Problems Maternal Grandmother    • No Known Problems Maternal Grandfather    • No Known Problems Daughter    • No Known Problems Maternal Aunt    • No Known Problems Father    • No Known Problems Paternal Grandmother    • No Known Problems Paternal Grandfather      Social History     Socioeconomic History   • Marital status: Registered Domestic Partner     Spouse name: None   • Number of children: None   • Years of education: None   • Highest education level: None   Occupational History   • None   Tobacco Use   • Smoking status: Former     Packs/day: 0.25     Years: 26.00     Total pack years: 6.50     Types: Cigarettes     Start date: 45     Quit date: 2016     Years since quittin.8   • Smokeless tobacco: Never   • Tobacco comments: Patient was a on and off smoker   Vaping Use   • Vaping Use: Never used   Substance and Sexual Activity   • Alcohol use: Yes     Comment: socially   • Drug use: Never   • Sexual activity: Not Currently   Other Topics Concern   • None   Social History Narrative   • None     Social Determinants of Health     Financial Resource Strain: Not on file   Food Insecurity: Not on file   Transportation Needs: Not on file   Physical Activity: Not on file   Stress: Not on file   Social Connections: Not on file   Intimate Partner Violence: Not on file   Housing Stability: Not on file     Current Outpatient Medications on File Prior to Visit   Medication Sig   • Cholecalciferol (D3 High Potency) 50 MCG (2000 UT) CAPS    • famotidine (PEPCID) 20 mg tablet Take 1 tablet (20 mg total) by mouth 2 (two) times a day   • linaCLOtide (Linzess) 145 MCG CAPS Take 1 capsule (145 mcg total) by mouth daily for 14 days   • Multiple Vitamins-Minerals (MULTIVITAMIN ADULTS 50+ PO) Take by mouth in the morning   • NON FORMULARY Take by mouth in the morning Magnesium -calcium vitamin d3   • [DISCONTINUED] BIOTIN PO Take by mouth (Patient not taking: Reported on 7/24/2023)   • [DISCONTINUED] Calcium Carbonate-Vit D-Min (Calcium 1200) 2968-8653 MG-UNIT CHEW  (Patient not taking: Reported on 7/24/2023)   • [DISCONTINUED] Ferrous Sulfate (FERRA PO) Take by mouth (Patient not taking: Reported on 7/24/2023)   • [DISCONTINUED] folic acid (FOLVITE) 1 mg tablet  (Patient not taking: Reported on 7/24/2023)   • [DISCONTINUED] omeprazole (PriLOSEC) 40 MG capsule Take 1 capsule (40 mg total) by mouth daily (Patient not taking: Reported on 11/21/2023)     Allergies   Allergen Reactions   • Penicillin G Hives     Immunization History   Administered Date(s) Administered   • COVID-19 MODERNA VACC 0.25 ML IM BOOSTER 12/01/2021   • COVID-19 MODERNA VACC 0.5 ML IM 01/10/2021, 02/09/2021, 12/01/2021   • INFLUENZA 10/05/2021       Objective     /72 (BP Location: Left arm, Patient Position: Sitting, Cuff Size: Standard)   Pulse (!) 48   Temp (!) 96.8 °F (36 °C) (Temporal)   Ht 5' 2.6" (1.59 m)   Wt 68.5 kg (151 lb)   SpO2 99%   BMI 27.09 kg/m²     Physical Exam  Timi Chávez MD

## 2023-11-22 ENCOUNTER — APPOINTMENT (OUTPATIENT)
Dept: LAB | Facility: MEDICAL CENTER | Age: 53
End: 2023-11-22
Payer: COMMERCIAL

## 2023-11-22 DIAGNOSIS — E53.8 B12 DEFICIENCY: ICD-10-CM

## 2023-11-22 DIAGNOSIS — D50.8 OTHER IRON DEFICIENCY ANEMIA: ICD-10-CM

## 2023-11-22 LAB
BASOPHILS # BLD AUTO: 0.02 THOUSANDS/ÂΜL (ref 0–0.1)
BASOPHILS NFR BLD AUTO: 0 % (ref 0–1)
EOSINOPHIL # BLD AUTO: 0.06 THOUSAND/ÂΜL (ref 0–0.61)
EOSINOPHIL NFR BLD AUTO: 1 % (ref 0–6)
ERYTHROCYTE [DISTWIDTH] IN BLOOD BY AUTOMATED COUNT: 12.9 % (ref 11.6–15.1)
FERRITIN SERPL-MCNC: 286 NG/ML (ref 11–307)
HCT VFR BLD AUTO: 50.1 % (ref 34.8–46.1)
HGB BLD-MCNC: 16.2 G/DL (ref 11.5–15.4)
IMM GRANULOCYTES # BLD AUTO: 0.01 THOUSAND/UL (ref 0–0.2)
IMM GRANULOCYTES NFR BLD AUTO: 0 % (ref 0–2)
IRON SATN MFR SERPL: 43 % (ref 15–50)
IRON SERPL-MCNC: 133 UG/DL (ref 50–212)
LYMPHOCYTES # BLD AUTO: 1.47 THOUSANDS/ÂΜL (ref 0.6–4.47)
LYMPHOCYTES NFR BLD AUTO: 29 % (ref 14–44)
MCH RBC QN AUTO: 30.9 PG (ref 26.8–34.3)
MCHC RBC AUTO-ENTMCNC: 32.3 G/DL (ref 31.4–37.4)
MCV RBC AUTO: 96 FL (ref 82–98)
MONOCYTES # BLD AUTO: 0.43 THOUSAND/ÂΜL (ref 0.17–1.22)
MONOCYTES NFR BLD AUTO: 8 % (ref 4–12)
NEUTROPHILS # BLD AUTO: 3.14 THOUSANDS/ÂΜL (ref 1.85–7.62)
NEUTS SEG NFR BLD AUTO: 62 % (ref 43–75)
NRBC BLD AUTO-RTO: 0 /100 WBCS
PLATELET # BLD AUTO: 154 THOUSANDS/UL (ref 149–390)
PMV BLD AUTO: 13.5 FL (ref 8.9–12.7)
RBC # BLD AUTO: 5.24 MILLION/UL (ref 3.81–5.12)
TIBC SERPL-MCNC: 312 UG/DL (ref 250–450)
UIBC SERPL-MCNC: 179 UG/DL (ref 155–355)
VIT B12 SERPL-MCNC: 353 PG/ML (ref 180–914)
WBC # BLD AUTO: 5.13 THOUSAND/UL (ref 4.31–10.16)

## 2023-11-22 PROCEDURE — 83540 ASSAY OF IRON: CPT

## 2023-11-22 PROCEDURE — 83550 IRON BINDING TEST: CPT

## 2023-11-22 PROCEDURE — 36415 COLL VENOUS BLD VENIPUNCTURE: CPT

## 2023-11-22 PROCEDURE — 82728 ASSAY OF FERRITIN: CPT

## 2023-11-27 ENCOUNTER — HOSPITAL ENCOUNTER (OUTPATIENT)
Dept: INFUSION CENTER | Facility: CLINIC | Age: 53
Discharge: HOME | End: 2023-11-27
Payer: COMMERCIAL

## 2023-11-27 DIAGNOSIS — K90.9 MALABSORPTION SYNDROME: ICD-10-CM

## 2023-11-27 DIAGNOSIS — D50.9 IRON DEFICIENCY ANEMIA, UNSPECIFIED IRON DEFICIENCY ANEMIA TYPE: Primary | ICD-10-CM

## 2023-11-27 DIAGNOSIS — D50.0 IRON DEFICIENCY ANEMIA SECONDARY TO BLOOD LOSS (CHRONIC): ICD-10-CM

## 2023-11-27 DIAGNOSIS — D50.8 OTHER IRON DEFICIENCY ANEMIA: ICD-10-CM

## 2023-11-27 PROCEDURE — 96372 THER/PROPH/DIAG INJ SC/IM: CPT

## 2023-11-27 RX ORDER — CYANOCOBALAMIN 1000 UG/ML
1000 INJECTION, SOLUTION INTRAMUSCULAR; SUBCUTANEOUS ONCE
Status: COMPLETED | OUTPATIENT
Start: 2023-11-27 | End: 2023-11-27

## 2023-11-27 RX ORDER — CYANOCOBALAMIN 1000 UG/ML
1000 INJECTION, SOLUTION INTRAMUSCULAR; SUBCUTANEOUS ONCE
OUTPATIENT
Start: 2023-12-22 | End: 2023-12-22

## 2023-11-27 RX ADMIN — CYANOCOBALAMIN 1000 MCG: 1000 INJECTION, SOLUTION INTRAMUSCULAR at 14:27

## 2023-11-27 NOTE — PROGRESS NOTES
Patient arrived for b12 injections. Received IM in right deltoid. Next appt scheduled. Patient declined AVS, aware of next appt time. Patient left infusion center in baseline condition.

## 2023-11-29 ENCOUNTER — OFFICE VISIT (OUTPATIENT)
Dept: GASTROENTEROLOGY | Facility: MEDICAL CENTER | Age: 53
End: 2023-11-29
Payer: COMMERCIAL

## 2023-11-29 VITALS
HEIGHT: 63 IN | BODY MASS INDEX: 27.11 KG/M2 | SYSTOLIC BLOOD PRESSURE: 124 MMHG | HEART RATE: 50 BPM | WEIGHT: 153 LBS | DIASTOLIC BLOOD PRESSURE: 81 MMHG | TEMPERATURE: 98 F

## 2023-11-29 DIAGNOSIS — Z98.84 STATUS POST BARIATRIC SURGERY: ICD-10-CM

## 2023-11-29 DIAGNOSIS — K21.9 CHEST PAIN DUE TO GERD: Primary | ICD-10-CM

## 2023-11-29 DIAGNOSIS — K21.9 GASTROESOPHAGEAL REFLUX DISEASE WITHOUT ESOPHAGITIS: ICD-10-CM

## 2023-11-29 DIAGNOSIS — R07.9 CHEST PAIN DUE TO GERD: Primary | ICD-10-CM

## 2023-11-29 DIAGNOSIS — K59.04 CHRONIC IDIOPATHIC CONSTIPATION: ICD-10-CM

## 2023-11-29 PROCEDURE — 99214 OFFICE O/P EST MOD 30 MIN: CPT | Performed by: INTERNAL MEDICINE

## 2023-11-29 RX ORDER — LANOLIN ALCOHOL/MO/W.PET/CERES
400 CREAM (GRAM) TOPICAL DAILY
COMMUNITY

## 2023-11-29 NOTE — PROGRESS NOTES
Outpatient Follow up  [facility] [address] [place] [address] Ramone Bates.  [address] [address]  [address] [address]  Jose Luis Sheriff MD  Ph : [phone]  Fax : [phone]  Mobile : [phone]  Email : [email or web address]  Also available on [employer]    Brenda Hartmann 46 y.o. female MRN: [id number]    PCP: Edward Brice MD  Referring: No referring provider defined for this encounter. Brenda Hartmann presented for a follow up visit. My recommendations are included. Please do not hesitate to contact me with any questions you may have. ASSESSMENT AND PLAN:      No problem-specific Assessment & Plan notes found for this encounter. Diagnoses and all orders for this visit:    Chest pain due to GERD    Chronic idiopathic constipation  -     linaCLOtide 290 MCG CAPS; Take 1 capsule by mouth daily before breakfast for 14 days    Gastroesophageal reflux disease without esophagitis    Status post bariatric surgery    Other orders  -     folic acid (FOLVITE) 007 mcg tablet; Take 400 mcg by mouth daily      50-year-old lady who presents was for follow-up after her last visit after which she had an endoscopy and manometry/pH testing. It appears that she does have regurgitation. She has had a history of sleeve gastrectomy. However she may also have a component of hypersensitive esophagus. She is not interested in any endoscopic or surgical therapy at this time. She feels improved with her current medications and I would recommend the following for her. Continue Pepcid if you are not having the symptoms as bad. Omeprazole 20 mg daily if symptoms are more often or if you are taking the pepcid more often. Constipation : Marcia Sanon works but is delayed and she does not want to take it as she does not want to go to the bathroom at work. I offered her other options as she has been well with Amitiza 24 mcg in the past (when in [place]).  She would like to try the Linzess 290mcg and see if it works for her. She will let us know how she responds to that. Follow up in 3 - 4 months with PA. Repeat cologuard in 2 years. Continue MVT.   ______________________________________________________________________    SUBJECTIVE:  46y.o. year old who presents with Follow-up (Patient recently had EGD and manometry. She states she feels alright but still having the reflux and heartburn. Taking medication as needed ) and Constipation (Patient taking linzess as needed but still having constipation )       Last GI office visit : 8/23/23    Summary of recommendations from last visit :   GERD post sleeve  Chronic constipation    Tests completed after last visit :     EGD : unremarkable but showed sleeve gastroplasty. Small hernia was seen. Manometry / pH : normal motility and There is evidence of recumbent reflux overall based on number of acid reflux episodes, overall acid exposure time, and symptom correlation patient has borderline positive acid reflux disease       Interval history :    She was off her PPI therapy for the pH test and did feel worse. She had regurgitation and heartburn feeling. She takes the PPI as needed. This usually helps her symptoms. She does feel burping and PPI helps with that. Her constipation is persistent. She did try Linzess but felt that the effects were delayed and she does not want to go to the bathroom at work. PRIOR ENDOSCOPIC EVALUATION :     Endoscopy : yes    Colonoscopy : 11/30/2022     Last Cologuard: 11/30/2022        REVIEW OF SYSTEMS IS OTHERWISE NEGATIVE.       Historical Information   Past Medical History:   Diagnosis Date    Anemia     HLD (hyperlipidemia)     Other hyperlipidemia 1/12/2022    Palpitations 2/17/2021    Sarcoidosis      Past Surgical History:   Procedure Laterality Date    ANKLE SURGERY Right 2018    COLONOSCOPY  2018    GASTRECTOMY SLEEVE LAPAROSCOPIC  july 8th 2021    KNEE ARTHROSCOPY W/ MENISCAL REPAIR Left 2013    TUBAL LIGATION     UPPER GASTROINTESTINAL ENDOSCOPY       Social History   Social History     Substance and Sexual Activity   Alcohol Use Not Currently    Comment: socially     Social History     Substance and Sexual Activity   Drug Use Never     Social History     Tobacco Use   Smoking Status Former    Packs/day: 0.25    Years: 26.00    Total pack years: 6.50    Types: Cigarettes    Start date:     Quit date: 2016    Years since quittin.9   Smokeless Tobacco Never   Tobacco Comments    Patient was a on and off smoker     Family History   Problem Relation Age of Onset    Dementia Mother     Seizures Mother     Hypertension Mother     Osteoporosis Mother     Glaucoma Mother     Macular degeneration Mother     Hearing loss Mother     Hyperlipidemia Mother     No Known Problems Sister     No Known Problems Daughter     No Known Problems Maternal Grandmother     No Known Problems Maternal Grandfather     No Known Problems Daughter     No Known Problems Maternal Aunt     No Known Problems Father     No Known Problems Paternal Grandmother     No Known Problems Paternal Grandfather        Meds/Allergies       Current Outpatient Medications:     Cholecalciferol (D3 High Potency) 50 MCG (2000 UT) CAPS    famotidine (PEPCID) 20 mg tablet    folic acid (FOLVITE) 589 mcg tablet    linaCLOtide 290 MCG CAPS    Multiple Vitamins-Minerals (MULTIVITAMIN ADULTS 50+ PO)    NON FORMULARY    Allergies   Allergen Reactions    Penicillin G Hives           Objective     Blood pressure 124/81, pulse (!) 50, temperature 98 °F (36.7 °C), temperature source Tympanic, height 5' 3" (1.6 m), weight 69.4 kg (153 lb). Body mass index is 27.1 kg/m². PHYSICAL EXAM:      Physical Exam  Constitutional:       Appearance: Normal appearance. She is well-developed. HENT:      Head: Normocephalic and atraumatic. Eyes:      General: No scleral icterus.      Conjunctiva/sclera: Conjunctivae normal.      Pupils: Pupils are equal, round, and reactive to light. Cardiovascular:      Rate and Rhythm: Normal rate and regular rhythm. Heart sounds: Normal heart sounds. Pulmonary:      Effort: Pulmonary effort is normal. No respiratory distress. Breath sounds: Normal breath sounds. Abdominal:      General: Bowel sounds are normal. There is no distension. Palpations: Abdomen is soft. There is no mass. Tenderness: There is no abdominal tenderness. Hernia: No hernia is present. Musculoskeletal:         General: Normal range of motion. Cervical back: Normal range of motion. Lymphadenopathy:      Cervical: No cervical adenopathy. Skin:     General: Skin is warm. Neurological:      Mental Status: She is alert and oriented to person, place, and time. Psychiatric:         Behavior: Behavior normal.         Thought Content: Thought content normal.       Lab Results:   Lab Results   Component Value Date/Time    WBC 5.13 11/22/2023 07:20 AM    HGB 16.2 (H) 11/22/2023 07:20 AM    HCT 50.1 (H) 11/22/2023 07:20 AM    MCV 96 11/22/2023 07:20 AM     11/22/2023 07:20 AM    SODIUM 142 02/23/2022 08:09 AM    K 4.2 02/23/2022 08:09 AM     02/23/2022 08:09 AM    CO2 27 02/23/2022 08:09 AM    BUN 14 02/23/2022 08:09 AM    CREATININE 0.86 02/23/2022 08:09 AM    GLUF 86 02/23/2022 08:09 AM    AST 12 02/23/2022 08:09 AM    ALT 18 02/23/2022 08:09 AM    ALKPHOS 67 02/23/2022 08:09 AM    TBILI 0.36 02/23/2022 08:09 AM    ALB 3.6 02/23/2022 08:09 AM    INR 1.11 04/05/2021 10:41 AM    FERRITIN 286 11/22/2023 07:20 AM    CONCFE 43 11/22/2023 07:20 AM    TIBC 312 11/22/2023 07:20 AM         Radiology Results:   Palma Cogan manometry/24hr ph    Result Date: 10/31/2023  Narrative: Indication- GERD Esophageal manometry Motility-6 out of 8 swallows demonstrated normal esophageal contractility pattern with mean DCI of 405 mmHg. s.cm LES-median IRP is within normal limits Impedance-80% complete clearance of liquid bolus swallows Rapid swallow index is greater than 1 Classification-normal esophageal motility 24-hour pH study-study off PPI therapy for 14 days Acid exposure time in the distal esophageal pH is 1.5% in the upright position Acid exposure time in the recumbent position is 4.9% Gastric pH is consistent with patient being off PPI therapy DeMeester score is 13 Acid for time is significant reported symptoms of burping 68 episodes of reflux overall-all episodes were acid or weakly acid reflux episodes There is evidence of recumbent reflux overall based on number of acid reflux episodes, overall acid exposure time, and symptom correlation patient has borderline positive acid reflux disease

## 2023-12-26 ENCOUNTER — HOSPITAL ENCOUNTER (OUTPATIENT)
Dept: INFUSION CENTER | Facility: CLINIC | Age: 53
Discharge: HOME | End: 2023-12-26
Payer: COMMERCIAL

## 2023-12-26 DIAGNOSIS — D50.8 OTHER IRON DEFICIENCY ANEMIA: ICD-10-CM

## 2023-12-26 DIAGNOSIS — D50.9 IRON DEFICIENCY ANEMIA, UNSPECIFIED IRON DEFICIENCY ANEMIA TYPE: Primary | ICD-10-CM

## 2023-12-26 DIAGNOSIS — K90.9 MALABSORPTION SYNDROME: ICD-10-CM

## 2023-12-26 DIAGNOSIS — D50.0 IRON DEFICIENCY ANEMIA SECONDARY TO BLOOD LOSS (CHRONIC): ICD-10-CM

## 2023-12-26 PROCEDURE — 96372 THER/PROPH/DIAG INJ SC/IM: CPT

## 2023-12-26 RX ORDER — CYANOCOBALAMIN 1000 UG/ML
1000 INJECTION, SOLUTION INTRAMUSCULAR; SUBCUTANEOUS ONCE
Status: COMPLETED | OUTPATIENT
Start: 2023-12-26 | End: 2023-12-26

## 2023-12-26 RX ORDER — CYANOCOBALAMIN 1000 UG/ML
1000 INJECTION, SOLUTION INTRAMUSCULAR; SUBCUTANEOUS ONCE
OUTPATIENT
Start: 2024-01-19 | End: 2024-01-19

## 2023-12-26 RX ADMIN — CYANOCOBALAMIN 1000 MCG: 1000 INJECTION, SOLUTION INTRAMUSCULAR at 12:52

## 2023-12-26 NOTE — PROGRESS NOTES
Pt. Denies new symptoms or concerns today.  Vitamin B12 given SQ in [place].   Future appointment scheduled for 1/24/24 as ordered q 28 days.  Pt. Is aware. AVS declined.

## 2023-12-26 NOTE — PLAN OF CARE
Problem: INFECTION - ADULT  Goal: Absence or prevention of progression during hospitalization  Description: INTERVENTIONS:  - Assess and monitor for signs and symptoms of infection  - Monitor lab/diagnostic results  - Monitor all insertion sites, i.e. indwelling lines, tubes, and drains  - Monitor endotracheal if appropriate and nasal secretions for changes in amount and color  - [place] appropriate cooling/warming therapies per order  - Administer medications as ordered  - Instruct and encourage patient and family to use good hand hygiene technique  - Identify and instruct in appropriate isolation precautions for identified infection/condition  Outcome: Progressing  Goal: Absence of fever/infection during neutropenic period  Description: INTERVENTIONS:  - Monitor WBC    Outcome: Progressing     Problem: Knowledge Deficit  Goal: Patient/family/caregiver demonstrates understanding of disease process, treatment plan, medications, and discharge instructions  Description: Complete learning assessment and assess knowledge base.  Interventions:  - Provide teaching at level of understanding  - Provide teaching via preferred learning methods  Outcome: Progressing

## 2024-01-09 DIAGNOSIS — K59.04 CHRONIC IDIOPATHIC CONSTIPATION: ICD-10-CM

## 2024-01-09 DIAGNOSIS — D50.0 IRON DEFICIENCY ANEMIA SECONDARY TO BLOOD LOSS (CHRONIC): Primary | ICD-10-CM

## 2024-01-09 DIAGNOSIS — D50.9 IRON DEFICIENCY ANEMIA, UNSPECIFIED IRON DEFICIENCY ANEMIA TYPE: ICD-10-CM

## 2024-01-09 DIAGNOSIS — D50.8 OTHER IRON DEFICIENCY ANEMIA: ICD-10-CM

## 2024-01-09 DIAGNOSIS — E53.8 B12 DEFICIENCY: ICD-10-CM

## 2024-01-09 DIAGNOSIS — K90.9 MALABSORPTION SYNDROME: ICD-10-CM

## 2024-01-12 ENCOUNTER — TELEPHONE (OUTPATIENT)
Age: 54
End: 2024-01-12

## 2024-01-12 NOTE — TELEPHONE ENCOUNTER
PA team was not notified of a PA needed for Linzes..     PA for Linzess 290    Submitted via  [x]CMM-KEY [id number]   [][employer]-Case ID #   []Faxed to plan   []Other website   []Phone call Case ID #     Office notes sent, clinical questions answered. Awaiting determination

## 2024-01-12 NOTE — TELEPHONE ENCOUNTER
Patients GI provider:  Dr. Li    Number to return call: [phone]    Reason for call: Pt calling stating she received a message that her linzess refill was denied. I called over to GI triage and spoke w/ nurse Hodges who reviewed chart and saw linzess was denied but did not show reason why. Pt would like a call back on this with reason why denied and what can be done to get it approved. Please reach to pt.    Scheduled procedure/appointment date if applicable: Apt 3/4/24

## 2024-01-15 NOTE — TELEPHONE ENCOUNTER
Pt is concerned, because she didn't get a call back. Pt stated that the Linzess worked for her, so she would like to get a refill on them.

## 2024-01-16 ENCOUNTER — OFFICE VISIT (OUTPATIENT)
Dept: OBGYN CLINIC | Facility: CLINIC | Age: 54
End: 2024-01-16
Payer: COMMERCIAL

## 2024-01-16 DIAGNOSIS — R68.82 DECREASED LIBIDO: Primary | ICD-10-CM

## 2024-01-16 DIAGNOSIS — N89.8 VAGINAL DRYNESS: ICD-10-CM

## 2024-01-16 DIAGNOSIS — N95.1 PERIMENOPAUSAL SYMPTOMS: ICD-10-CM

## 2024-01-16 DIAGNOSIS — R63.5 WEIGHT GAIN: ICD-10-CM

## 2024-01-16 PROBLEM — Z00.00 ANNUAL PHYSICAL EXAM: Status: RESOLVED | Noted: 2022-04-25 | Resolved: 2024-01-16

## 2024-01-16 PROCEDURE — 99215 OFFICE O/P EST HI 40 MIN: CPT | Performed by: OBSTETRICS & GYNECOLOGY

## 2024-01-17 ENCOUNTER — APPOINTMENT (OUTPATIENT)
Dept: LAB | Facility: MEDICAL CENTER | Age: 54
End: 2024-01-17
Payer: COMMERCIAL

## 2024-01-17 DIAGNOSIS — R63.5 WEIGHT GAIN: ICD-10-CM

## 2024-01-17 DIAGNOSIS — E53.8 FOLATE DEFICIENCY: ICD-10-CM

## 2024-01-17 DIAGNOSIS — D50.9 IRON DEFICIENCY ANEMIA, UNSPECIFIED IRON DEFICIENCY ANEMIA TYPE: ICD-10-CM

## 2024-01-17 DIAGNOSIS — R68.82 DECREASED LIBIDO: ICD-10-CM

## 2024-01-17 DIAGNOSIS — K90.9 MALABSORPTION SYNDROME: ICD-10-CM

## 2024-01-17 DIAGNOSIS — Z98.84 STATUS POST BARIATRIC SURGERY: ICD-10-CM

## 2024-01-17 DIAGNOSIS — N95.1 PERIMENOPAUSAL SYMPTOMS: ICD-10-CM

## 2024-01-17 DIAGNOSIS — E53.8 B12 DEFICIENCY: ICD-10-CM

## 2024-01-17 LAB
CORTIS AM PEAK SERPL-MCNC: 17.2 UG/DL (ref 6.7–22.6)
FSH SERPL-ACNC: 12 MIU/ML
PROGEST SERPL-MCNC: 0.71 NG/ML
T3FREE SERPL-MCNC: 3.13 PG/ML (ref 2.5–3.9)
TESTOST SERPL-MSCNC: <10 NG/DL
TSH SERPL DL<=0.05 MIU/L-ACNC: 1.69 UIU/ML (ref 0.45–4.5)

## 2024-01-17 PROCEDURE — 82627 DEHYDROEPIANDROSTERONE: CPT

## 2024-01-17 PROCEDURE — 84443 ASSAY THYROID STIM HORMONE: CPT

## 2024-01-17 PROCEDURE — 82670 ASSAY OF TOTAL ESTRADIOL: CPT

## 2024-01-17 PROCEDURE — 84481 FREE ASSAY (FT-3): CPT

## 2024-01-17 PROCEDURE — 84403 ASSAY OF TOTAL TESTOSTERONE: CPT

## 2024-01-17 PROCEDURE — 36415 COLL VENOUS BLD VENIPUNCTURE: CPT

## 2024-01-17 PROCEDURE — 83001 ASSAY OF GONADOTROPIN (FSH): CPT

## 2024-01-17 PROCEDURE — 82533 TOTAL CORTISOL: CPT

## 2024-01-17 PROCEDURE — 84144 ASSAY OF PROGESTERONE: CPT

## 2024-01-18 LAB
DHEA-S SERPL-MCNC: 43 UG/DL (ref 41.2–243.7)
ESTRADIOL SERPL-MCNC: 79 PG/ML

## 2024-01-18 RX ORDER — ESTRADIOL 10 UG/1
1 INSERT VAGINAL 2 TIMES WEEKLY
Qty: 8 TABLET | Refills: 11 | Status: SHIPPED | OUTPATIENT
Start: 2024-01-18

## 2024-01-18 NOTE — PROGRESS NOTES
Assessment/Plan:         Diagnoses and all orders for this visit:    Decreased libido  -     DHEA-sulfate; Future  -     Testosterone; Future    Weight gain  -     Cancel: GP TSH W/ REFLEX TO FREE T4  -     T3, free; Future  -     Cortisol Level, AM Specimen; Future    Perimenopausal symptoms  -     Follicle stimulating hormone; Future  -     Estradiol; Future  -     Progesterone; Future    Vaginal dryness  -     estradiol (VAGIFEM, YUVAFEM) 10 MCG TABS vaginal tablet; Insert 1 tablet (10 mcg total) into the vagina 2 (two) times a week        1)This was a lengthy visit spent discussing the HPATG (hypothalamus/pituitary/adrenal/thyroid/gonadal) axis and the impact that hormonal deviation in one gland can have on another. It is not uncommon for ovarian declined to coincide or invoke thyroid and adrenal dysfunction as well.  2) Discussed the wide and varied hormonal fluctuations associated with the perimenopausal time frame, often resulting in estrogen dominance and relative progesterone deficiency. This results in menorrhagia, uterine cellular growth with fibroids, endometriosis, breast density along with progesterone loss symptoms including sleep and anxiety or mood.   3) She desires testing, will evaluate ovarian/thryoid/ adrenal axis, hold on 24 hr cortisol testing for now. I will email with results unless abnormal, follow up OV more prudent.  4) Testosterone is THE libido hormone. Testosterone supplementation discussed in detail, including lack of FDA approval for women and cost concerns, as insurance will not reimburse. Side effects include: increased libido, increased muscle mass, decreased fat mass, erythrocytosis ( NOT polycythemia rubra), increased energy, acne, increased hair growth or loss. May consider testosterone cream if appropriate once testing complete.   5) Change E2 cream to E2 tablets twice weekly for vaginal dryness. Coconut oil for lubricant   6)Do not think she needs HRT yet, but cortisol  reducers or progesterone would be useful adjuncts for sleep support if desired.   7) Follow up prn results and progress.     This was a 50 minute visit with greater than 50% of time spent in face to face counseling and coordination of care.    Subjective:      Patient ID: Bhavna Timmons is a 53 y.o. female.    Bhavna presents for hormonal consult, her first visit with me; self referred, sees Dr. Saucedo for gyn care.   Bhavna is perimenopausal, periods are virtually monthly except for recently past couple months, have been coming days early. Flow is heavy at first, then light. Has needed iron infusions in the past due to iron malabsorption for blood loss. Tubal for contraception. She complains of:  1) Hot flashes  2) Weight gain. S/p gastric sleeve in 2021, lost 50 lbs, but weight is creeping back up, up 15 lbs since surgery. Food cravings have returned that were better after surgery  3) Constipation, improved with Linzess  4) Mood swings, agitated  5) Decreased libido  6 Vaginal dryness. Tried cream once but hates the goop, did not do for long.   PMHX: as above; GERD; hx of sarcoidosis  SHX: MA with [facility] weight management program! Denies tobacco, ETOH  FHX: Mom Alzheimers 83, also with HTN, CVD, CHF. MGF murdered; MGM TB. Dad unknown. 1 brother, lung CA COD 30s. 3 kids, 36/35/27, healthy.           Review of Systems   Constitutional:  Positive for unexpected weight change. Negative for activity change and appetite change.   Gastrointestinal:  Positive for constipation.   Endocrine: Positive for heat intolerance.   Genitourinary:  Positive for dyspareunia and menstrual problem.        Decreased libido   Neurological: Negative.    Psychiatric/Behavioral:  Positive for agitation, dysphoric mood and sleep disturbance.        Objective:      There were no vitals taken for this visit.         Physical Exam  Constitutional:       Appearance: Normal appearance.   Neurological:      Mental Status: She is alert.

## 2024-01-23 DIAGNOSIS — R68.82 LOW LIBIDO: Primary | ICD-10-CM

## 2024-01-24 ENCOUNTER — HOSPITAL ENCOUNTER (OUTPATIENT)
Dept: INFUSION CENTER | Facility: CLINIC | Age: 54
Discharge: HOME | End: 2024-01-24
Payer: COMMERCIAL

## 2024-01-24 DIAGNOSIS — D50.9 IRON DEFICIENCY ANEMIA, UNSPECIFIED IRON DEFICIENCY ANEMIA TYPE: Primary | ICD-10-CM

## 2024-01-24 DIAGNOSIS — K90.9 MALABSORPTION SYNDROME: ICD-10-CM

## 2024-01-24 DIAGNOSIS — E53.8 B12 DEFICIENCY: ICD-10-CM

## 2024-01-24 DIAGNOSIS — D50.0 IRON DEFICIENCY ANEMIA SECONDARY TO BLOOD LOSS (CHRONIC): ICD-10-CM

## 2024-01-24 DIAGNOSIS — D50.8 OTHER IRON DEFICIENCY ANEMIA: ICD-10-CM

## 2024-01-24 PROCEDURE — 96372 THER/PROPH/DIAG INJ SC/IM: CPT

## 2024-01-24 RX ORDER — CYANOCOBALAMIN 1000 UG/ML
1000 INJECTION, SOLUTION INTRAMUSCULAR; SUBCUTANEOUS ONCE
OUTPATIENT
Start: 2024-02-16 | End: 2024-02-16

## 2024-01-24 RX ORDER — CYANOCOBALAMIN 1000 UG/ML
1000 INJECTION, SOLUTION INTRAMUSCULAR; SUBCUTANEOUS ONCE
Status: COMPLETED | OUTPATIENT
Start: 2024-01-24 | End: 2024-01-24

## 2024-01-24 RX ADMIN — CYANOCOBALAMIN 1000 MCG: 1000 INJECTION, SOLUTION INTRAMUSCULAR at 12:32

## 2024-01-24 NOTE — PROGRESS NOTES
B12 injection given without incident. Denies need for AVS, aware of next scheduled appointment on 2/21 @ 123.

## 2024-01-25 ENCOUNTER — OFFICE VISIT (OUTPATIENT)
Dept: BARIATRICS | Facility: CLINIC | Age: 54
End: 2024-01-25
Payer: COMMERCIAL

## 2024-01-25 VITALS
HEART RATE: 49 BPM | TEMPERATURE: 97 F | HEIGHT: 63 IN | DIASTOLIC BLOOD PRESSURE: 70 MMHG | SYSTOLIC BLOOD PRESSURE: 124 MMHG | WEIGHT: 154 LBS | BODY MASS INDEX: 27.29 KG/M2

## 2024-01-25 DIAGNOSIS — Z48.815 ENCOUNTER FOR SURGICAL AFTERCARE FOLLOWING SURGERY OF DIGESTIVE SYSTEM: Primary | ICD-10-CM

## 2024-01-25 DIAGNOSIS — R63.5 ABNORMAL WEIGHT GAIN: ICD-10-CM

## 2024-01-25 DIAGNOSIS — K21.9 GERD (GASTROESOPHAGEAL REFLUX DISEASE): ICD-10-CM

## 2024-01-25 DIAGNOSIS — K91.2 POSTSURGICAL MALABSORPTION: ICD-10-CM

## 2024-01-25 DIAGNOSIS — Z98.84 BARIATRIC SURGERY STATUS: ICD-10-CM

## 2024-01-25 PROCEDURE — 99204 OFFICE O/P NEW MOD 45 MIN: CPT | Performed by: NURSE PRACTITIONER

## 2024-01-25 NOTE — PROGRESS NOTES
Assessment/Plan:     Patient ID: Bhavna Timmons is a 53 y.o. female.     Bariatric Surgery Status/GERD/Abnormal weight gain    -s/p Vertical Sleeve Gastrectomy with Dr. Ricky Centeno in [facility] in 07/2021. Presents to the office today to establish care. She has doing well, however has some weight regain; noticed she has been snacking often. Doesn't get enough protein intake in her diet as she developed some food aversion after her surgery. Doesn't get to drink enough fluids per day. Walking as her activity and increases activity in the gym during warmer months. Denies having any abdominal pain, N/V/D/C, regurgitation, or dysphagia. Taking omeprazole PRN and pepcid PRN for reflux and heartburn; typically triggered by certain food intake and more noticeable with her weight regain.    Able to reach thao of 136 lbs; currently weighs 154 lbs; gained over the past year. Interested in seeing medical weight management to help further weight loss.     PLAN:     - Obtain UGI to review anatomy.   - continue with pepcid for now for GERD symptoms. Anticipate improvement with symptoms with further weight loss  - F/u with [facility] to help with weight loss.   - Recommended to start tracking caloric intake of 1000 calories per day; limit snacking by incorporating more protein into her diet.   - Routine follow up in 1 year for annual visit.  - Continue with healthy lifestyle, adequate protein intake of 60 gm, fluid intake of at least 64 oz.   - Continue with MVI daily.   - Activity as tolerated.   - Labs ordered and will adjust accordingly if any deficiency.   - Follow up with RD and SW as needed.   .    Continued/Maintain healthy weight loss with good nutrition intakes.  Adequate hydration with at least 64oz. fluid intake.  Follow diet as discussed.  Follow vitamin and mineral recommendations as reviewed with you.  Exercise as tolerated.    Colonoscopy referral made: UTD - had cologard - due in 2  years  Mammogram - utd  EGD screening - UTD - due in 2026.     Follow-up in 1 year for annual visit. We kindly ask that your arrive 15 minutes before your scheduled appointment time with your provider to allow our staff to room you, get your vital signs and update your chart.    Get lab work done prior to annual visit. Please call the office if you need a script.  It is recommended to check with your insurance BEFORE getting labs done to make sure they are covered by your policy.      Call our office if you have any problems with abdominal pain especially associated with fever, chills, nausea, vomiting or any other concerns.    All  Post-bariatric surgery patients should be aware that very small quantities of any alcohol can cause impairment and it is very possible not to feel the effect. The effect can be in the system for several hours.  It is also a stomach irritant.     It is advised to AVOID alcohol, Nonsteroidal antiinflammatory drugs (NSAIDS) and nicotine of all forms . Any of these can cause stomach irritation/pain.    Discussed the effects of alcohol on a bariatric patient and the increased impairment risk.     Keep up the good work!     Postsurgical Malabsorption   -At risk for malabsorption of vitamins/minerals secondary to malabsorption and restriction of intake from bariatric surgery  -NOT Currently taking adequate postop bariatric surgery vitamin   -Next set of bariatric labs ordered for approximately 2 weeks  -Patient received education about the importance of adhering to a lifelong supplementation regimen to avoid vitamin/mineral deficiencies      Diagnoses and all orders for this visit:    Encounter for surgical aftercare following surgery of digestive system  -     FL UPPER GI UGI; Future  -     Comprehensive metabolic panel; Future  -     PTH, intact; Future  -     Vitamin A; Future  -     Vitamin B1, whole blood; Future  -     Vitamin D 25 hydroxy; Future  -     Zinc; Future    Bariatric surgery  status  -     FL UPPER GI UGI; Future  -     Comprehensive metabolic panel; Future  -     PTH, intact; Future  -     Vitamin A; Future  -     Vitamin B1, whole blood; Future  -     Vitamin D 25 hydroxy; Future  -     Zinc; Future    Postsurgical malabsorption  -     FL UPPER GI UGI; Future  -     Comprehensive metabolic panel; Future  -     PTH, intact; Future  -     Vitamin A; Future  -     Vitamin B1, whole blood; Future  -     Vitamin D 25 hydroxy; Future  -     Zinc; Future    BMI 27.0-27.9,adult  -     FL UPPER GI UGI; Future  -     Comprehensive metabolic panel; Future  -     PTH, intact; Future  -     Vitamin A; Future  -     Vitamin B1, whole blood; Future  -     Vitamin D 25 hydroxy; Future  -     Zinc; Future    GERD (gastroesophageal reflux disease)    Abnormal weight gain         Subjective:      Patient ID: Bhavna Timmons is a 53 y.o. female.      -s/p Vertical Sleeve Gastrectomy with Dr. Ricky Centeno in [facility] in [place] in 07/2021. Presents to the office today to establish care. She has doing well, however has some weight regain; noticed she has been snacking often. Doesn't get enough protein intake in her diet as she developed some food aversion after her surgery. Doesn't get to drink enough fluids per day. Walking as her activity and increases activity in the gym during warmer months. Denies having any abdominal pain, N/V/D/C, regurgitation, or dysphagia. Taking omeprazole PRN and pepcid PRN for reflux and heartburn; typically triggered by certain food intake and more noticeable with her weight regain.    Able to reach thao of 136 lbs; currently weighs 154 lbs; gained over the past year.     Initial: 200 lbs   Current: 154 lbs   EWL: (Weight loss is ahead of schedule at this post surgical period.)  Thao: 136-137 lbs  Goal - 140s   Current BMI is Body mass index is 27.28 kg/m².    Tolerating a regular diet-yes  Eating at least 60 grams of protein per day-no  Following  "30/60 minute rule with liquids-yes   Drinking at least 64 ounces of fluid per day-no  Drinking carbonated beverages-no  Sufficient exercise-yes - walking   Using NSAIDs regularly-no  Using nicotine-no  Using alcohol- Rarely - wine   Supplements: Multivitamins and vitamin D3 2000 IU, magnesium (with Vitamin D3), women's one a day, folic acid. + iron infusions + B12 injections. - advised to switch to bariatric multivitamins and add calcium; or can keep the way she is taking her multivitamins, add sublingual form of B12 and calcium 2-3 times per day.       The following portions of the patient's history were reviewed and updated as appropriate: allergies, current medications, past family history, past medical history, past social history, past surgical history and problem list.    Review of Systems   Constitutional:  Positive for fatigue and unexpected weight change.   Respiratory: Negative.     Cardiovascular: Negative.    Gastrointestinal:         Heartburn     Musculoskeletal: Negative.    Neurological: Negative.    Psychiatric/Behavioral: Negative.           Objective:    /70   Pulse (!) 49   Temp (!) 97 °F (36.1 °C) (Tympanic)   Ht 5' 3\" (1.6 m)   Wt 69.9 kg (154 lb)   BMI 27.28 kg/m²      Physical Exam  Vitals and nursing note reviewed.   Constitutional:       Appearance: Normal appearance.   Cardiovascular:      Rate and Rhythm: Regular rhythm. Bradycardia present.      Pulses: Normal pulses.      Heart sounds: Normal heart sounds.   Pulmonary:      Effort: Pulmonary effort is normal.      Breath sounds: Normal breath sounds.   Abdominal:      General: Bowel sounds are normal.      Palpations: Abdomen is soft.      Tenderness: There is no abdominal tenderness.   Musculoskeletal:         General: Normal range of motion.   Skin:     General: Skin is warm and dry.   Neurological:      General: No focal deficit present.      Mental Status: She is alert and oriented to person, place, and time. "   Psychiatric:         Mood and Affect: Mood normal.         Behavior: Behavior normal.         Thought Content: Thought content normal.         Judgment: Judgment normal.

## 2024-01-25 NOTE — PATIENT INSTRUCTIONS
- advised to work on increasing on your protein intake with a goal 60 gm per day. Try to incorporate protein shakes, powder in smoothies, bars and work on other foods enriched in protein. Try to track your caloric intake of 1000 calories.   - Increase in fluid intake - try doing 30/30 minute rule.   - vitamin B12 1000 mcg sublingual form (if you choose to stay on your regular multivitamins) - please add this once a day  - Please add calcium 2-3 times per day.   - If you are to change to bariatric multivitamins then please wait about 2 months before obtaining vitamin levels. Please make sure you are fasting when you get your labs done.   - Refer to [facility] for further weight loss.   - follow up in 1 year for routine annual visit

## 2024-01-30 ENCOUNTER — TELEPHONE (OUTPATIENT)
Dept: HEMATOLOGY ONCOLOGY | Facility: CLINIC | Age: 54
End: 2024-01-30

## 2024-01-30 NOTE — TELEPHONE ENCOUNTER
Appointment Change  Cancel, Reschedule, Change to Virtual      Who are you speaking with? Patient   If it is not the patient, is the caller listed on the communication consent form? No   Which provider is the appointment scheduled with? Dr. Das   When was the original appointment scheduled?    Please list date and time 2/2 8:40am   At which location is the appointment scheduled to take place? [place]   Was the appointment rescheduled?     Was the appointment changed from an in person visit to a virtual visit?    If so, please list the details of the change. No, will call   What is the reason for the appointment change? Sched conflict       Was STAR transport scheduled? No   Does STAR transport need to be scheduled for the new visit (if applicable) No   Does the patient need an infusion appointment rescheduled? No   Does the patient have an upcoming infusion appointment scheduled? If so, when? Yes, 2/21   Is the patient undergoing chemotherapy? No   For appointments cancelled with less than 24 hours:  Was the no-show policy reviewed? Yes

## 2024-02-09 ENCOUNTER — OFFICE VISIT (OUTPATIENT)
Dept: BARIATRICS | Facility: CLINIC | Age: 54
End: 2024-02-09
Payer: COMMERCIAL

## 2024-02-09 ENCOUNTER — TELEPHONE (OUTPATIENT)
Age: 54
End: 2024-02-09

## 2024-02-09 VITALS
WEIGHT: 152.4 LBS | DIASTOLIC BLOOD PRESSURE: 75 MMHG | SYSTOLIC BLOOD PRESSURE: 115 MMHG | HEIGHT: 62 IN | BODY MASS INDEX: 28.05 KG/M2 | HEART RATE: 50 BPM | RESPIRATION RATE: 16 BRPM

## 2024-02-09 DIAGNOSIS — Z98.84 STATUS POST BARIATRIC SURGERY: ICD-10-CM

## 2024-02-09 DIAGNOSIS — Z86.39 HISTORY OF OBESITY: ICD-10-CM

## 2024-02-09 DIAGNOSIS — Z90.3 HISTORY OF SLEEVE GASTRECTOMY: ICD-10-CM

## 2024-02-09 DIAGNOSIS — Z86.2 HISTORY OF SARCOIDOSIS: ICD-10-CM

## 2024-02-09 DIAGNOSIS — K21.9 GERD (GASTROESOPHAGEAL REFLUX DISEASE): ICD-10-CM

## 2024-02-09 DIAGNOSIS — E66.3 OVERWEIGHT: Primary | ICD-10-CM

## 2024-02-09 DIAGNOSIS — K21.9 GASTROESOPHAGEAL REFLUX DISEASE WITHOUT ESOPHAGITIS: ICD-10-CM

## 2024-02-09 PROBLEM — F41.9 ANXIETY DISORDER: Status: RESOLVED | Noted: 2021-04-27 | Resolved: 2024-02-09

## 2024-02-09 PROCEDURE — 99214 OFFICE O/P EST MOD 30 MIN: CPT | Performed by: NURSE PRACTITIONER

## 2024-02-09 NOTE — TELEPHONE ENCOUNTER
PA for Wegovy    Submitted via  []CMM-KEY    [x][employer]-Case ID # [id number]   []Faxed to plan   []Other website    []Phone call Case ID #       Office notes sent, clinical questions answered. Awaiting determination

## 2024-02-09 NOTE — PATIENT INSTRUCTIONS
I am sending the Wegovy to your pharmacy. This will start the prior authorization process. My office takes care of that. It can take up to 3 weeks to process. Start Wegovy 0.25 mg subcutaneously once a week for 4 weeks, then increase to 0.5 mg subcutaneously each week. After you take the first pen of the starting dose of 0.25 mg, please message me with an update regarding how you are feeling. As long as you are tolerating it well, I will submit the next dose of 0.5 mg to the pharmacy, as we will also likely need to do another prior authorization for that dose.     - Side effects of Wegovy discussed: nausea, vomiting, diarrhea, and constipation. If severe abdominal pain develops, stop Wegovy and go to the ER, as this could be pancreatitis. Monitor heart rate while on Wegovy and if resting heart rate greater than 100 beats per minute, patient will notify me.   - If you need to have surgery or another procedure, such as an upper endoscopy or colonoscopy, please contact my office as often medications like Wegovy need to be held for a certain amount of time prior to a procedure.

## 2024-02-09 NOTE — ASSESSMENT & PLAN NOTE
- Taking pepcid. May improve with weight loss and lifestyle modification. Continue management with prescribing provider.

## 2024-02-09 NOTE — ASSESSMENT & PLAN NOTE
- S/P Vertical Sleeve Gastrectomy with Dr. Ricky Centeno in [facility] in [place] in 07/2021. Weight prior to surgery 200 lbs, thao 136 lbs.

## 2024-02-09 NOTE — PROGRESS NOTES
Assessment/Plan:    Overweight  - S/P Vertical Sleeve Gastrectomy with Dr. Ricky Centeno in [facility] in [place] in 07/2021. Weight prior to surgery 200 lbs, thao 136 lbs.  - Discussed options of HealthyCORE-Intensive Lifestyle Intervention Program, Very Low Calorie Diet-VLCD, and Conservative Program and the role of weight loss medications.  - Explained the importance of making lifestyle changes with anti-obesity medications.  - Patient is interested in pursuing Conservative Program  - Initial weight loss goal of 5-10% weight loss for improved health  - Weight loss can improve patient's co-morbid conditions and/or prevent weight-related complications.  - S/P tubal ligation.  - Struggling with appetite and interested in starting weight loss medication to assist with that.   - FDA approved weight loss medications reviewed: Wegovy, Saxenda, Zepbound, Qsymia, Contrave, and Phentermine. Wegovy and Saxenda shortage discussed. Off label use of medications discussed.   - Previously on phentermine, but will avoid this due to history of palpitations and abnormal stress ECG - had cardiac cath, which was unremarkable.   - Discussed Topamax and Wellbutrin/Naltrexone, but she is concerned about mood changes with those medications.    - Not currently interested in metformin.   - She is interested in GLP-1 therapy.  - Zepbound not covered.   - She made an informed decision to start Wegovy.  - Patient denies personal history of pancreatitis. Patient also denies personal and family history of thyroid cancer and multiple endocrine neoplasia type 2 (MEN 2 tumor).   - Side effects of Wegovy discussed: nausea, vomiting, diarrhea, and constipation. If severe abdominal pain develops, stop Wegovy and go to the ER, as this could be pancreatitis. Monitor heart rate while on Wegovy and if resting heart rate greater than 100 beats per minute, patient will notify me.   - If you need to have surgery or another procedure,  such as an upper endoscopy or colonoscopy, please contact my office as often medications like Wegovy need to be held for a certain amount of time prior to a procedure.   - Labs reviewed: TSH 1/17/2024. A1C and lipid 8/23/2023. LDL elevated, which will likely improve with weight loss. Remainder of the blood work within acceptable range.  - check CMP and fasting insulin.      Goals:  Do not skip meals. Get protein with each meal. Can supplement with protein shakes or bars.   Food log (ie.) www.myfitnesspal.com,sparkpeople.com,loseit.com,calorieking.com,etc. baritastic (use skinnytaste.com, Loopcam or smartphone andi Anne Fogarty for recipes)  No sugary beverages. At least 64oz of water daily.  Increase physical activity by 10 minutes daily. Gradually increase physical activity to a goal of 5 days per week for 30 minutes of MODERATE intensity PLUS 2 days per week of FULL BODY resistance training (use smartphone apps ActionFlow, Home Workout, etc.)  Start food logging, weighing, and measuring food.   2906-1673 calories per day. Sample menu given.   Gradually increase water intake to goal of at least 64 oz daily.  Gradually increase walking to goal of 5 days per week for 30 minutes and 2 days per week resistance training.   Start Wegovy.        Status post bariatric surgery  - S/P Vertical Sleeve Gastrectomy with Dr. Ricky Centeno in [facility] in [place] in 07/2021. Weight prior to surgery 200 lbs, thao 136 lbs.     Gastroesophageal reflux disease without esophagitis  - Taking pepcid. May improve with weight loss and lifestyle modification. Continue management with prescribing provider.          Bhavna was seen today for consult.    Diagnoses and all orders for this visit:    Overweight  -     Semaglutide-Weight Management (WEGOVY) 0.25 MG/0.5ML; Inject 0.5 mL (0.25 mg total) under the skin once a week  -     Insulin, fasting; Future    History of obesity  -     Semaglutide-Weight Management  (WEGOVY) 0.25 MG/0.5ML; Inject 0.5 mL (0.25 mg total) under the skin once a week  -     Insulin, fasting; Future    GERD (gastroesophageal reflux disease)  -     Semaglutide-Weight Management (WEGOVY) 0.25 MG/0.5ML; Inject 0.5 mL (0.25 mg total) under the skin once a week  -     Insulin, fasting; Future    History of sleeve gastrectomy  -     Semaglutide-Weight Management (WEGOVY) 0.25 MG/0.5ML; Inject 0.5 mL (0.25 mg total) under the skin once a week  -     Insulin, fasting; Future    Status post bariatric surgery    History of sarcoidosis    Gastroesophageal reflux disease without esophagitis        Total time spent: 40 min, with >50% face-to-face time spent counseling patient on nonsurgical interventions for the treatment of excess weight. Discussed in detail nonsurgical options including intensive lifestyle intervention program, very low-calorie diet program and conservative program.  Discussed the role of weight loss medications.  Counseled patient on diet behavior and exercise modification for weight loss.        Follow up in approximately  2 month nurse visit and 4 months  with Non-Surgical Physician/Advanced Practitioner.    Subjective:   Chief Complaint   Patient presents with    Consult     MWM Consult;[id number]       Patient ID: Bhavna Timmons  is a 53 y.o. female with excess weight/obesity here to pursue weight management.  Previous notes and records have been reviewed.    Past Medical History:   Diagnosis Date    Anemia     HLD (hyperlipidemia)     Other hyperlipidemia 1/12/2022    Palpitations 2/17/2021    Sarcoidosis      Past Surgical History:   Procedure Laterality Date    ANKLE SURGERY Right 2018    COLONOSCOPY  2018    GASTRECTOMY SLEEVE LAPAROSCOPIC  july 8th 2021    KNEE ARTHROSCOPY W/ MENISCAL REPAIR Left 2013    TUBAL LIGATION  1997    UPPER GASTROINTESTINAL ENDOSCOPY         HPI:  Wt Readings from Last 20 Encounters:   02/09/24 69.1 kg (152 lb 6.4 oz)   01/25/24 69.9 kg (154 lb)   11/29/23  69.4 kg (153 lb)   11/21/23 68.5 kg (151 lb)   10/02/23 69.1 kg (152 lb 5.4 oz)   08/23/23 69.1 kg (152 lb 6.4 oz)   07/24/23 70.3 kg (155 lb)   06/27/23 68.8 kg (151 lb 9.6 oz)   11/11/22 63 kg (139 lb)   11/10/22 62.1 kg (137 lb)   08/11/22 62.1 kg (137 lb)   05/09/22 62.7 kg (138 lb 3.2 oz)   04/12/22 62.1 kg (137 lb)   04/12/22 63 kg (139 lb)   03/10/22 62.1 kg (137 lb)   02/24/22 62.1 kg (137 lb)   01/12/22 62.6 kg (138 lb)   10/07/21 68.5 kg (151 lb)   06/08/21 86.2 kg (190 lb)   05/10/21 86.1 kg (189 lb 12.8 oz)     S/P Vertical Sleeve Gastrectomy with Dr. Ricky Centeno in [facility] in [place] in 07/2021. Weight prior to surgery 200 lbs, thao 136 lbs. Has been regaining weight over the past year. Referred by surgical team to [facility] for weight regain.     Snacking a lot - feels hungry. Eating healthy foods, but tends to snack more in the evening.     Phentermine in the past.      Not food logging.     Follows 30/60 rule.      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    Family History   Problem Relation Age of Onset    Dementia Mother     Seizures Mother     Hypertension Mother     Osteoporosis Mother     Glaucoma Mother     Macular degeneration Mother     Hearing loss Mother     Hyperlipidemia Mother     No Known Problems Father     No Known Problems Sister     No Known Problems Daughter     No Known Problems Daughter     Heart disease Maternal Aunt     Diabetes  "Maternal Aunt     No Known Problems Maternal Grandmother     No Known Problems Maternal Grandfather     No Known Problems Paternal Grandmother     No Known Problems Paternal Grandfather         Review of Systems   HENT:  Negative for sore throat.    Respiratory:  Negative for cough and shortness of breath.    Cardiovascular:  Negative for chest pain and palpitations.   Gastrointestinal:  Negative for abdominal pain, constipation, diarrhea, nausea and vomiting.        Denies GERD   Musculoskeletal:  Negative for arthralgias and back pain.   Skin:  Negative for rash.   Psychiatric/Behavioral:  Negative for suicidal ideas (or HI).         Denies depression and anxiety       Objective:  /75   Pulse (!) 50   Resp 16   Ht 5' 2\" (1.575 m)   Wt 69.1 kg (152 lb 6.4 oz)   BMI 27.87 kg/m²     Physical Exam  Vitals and nursing note reviewed.        Constitutional   General appearance: Abnormal.  well developed and overweight.   Eyes No conjunctival injection.   Ears, Nose, Mouth, and Throat Oral mucosa moist.   Pulmonary   Respiratory effort: No increased work of breathing or signs of respiratory distress.    Cardiovascular   Examination of extremities for edema and/or varicosities: Normal.  no edema.   Abdomen   Abdomen: Abnormal overweight.     Musculoskeletal   Normal range of motion  Neurological   Gait and station: Normal.   Psychiatric   Orientation to person, place and time: Normal.    Affect: appropriate     "

## 2024-02-09 NOTE — ASSESSMENT & PLAN NOTE
- S/P Vertical Sleeve Gastrectomy with Dr. Ricky Centeno in [facility] in [place] in 07/2021. Weight prior to surgery 200 lbs, thao 136 lbs.  - Discussed options of HealthyCORE-Intensive Lifestyle Intervention Program, Very Low Calorie Diet-VLCD, and Conservative Program and the role of weight loss medications.  - Explained the importance of making lifestyle changes with anti-obesity medications.  - Patient is interested in pursuing Conservative Program  - Initial weight loss goal of 5-10% weight loss for improved health  - Weight loss can improve patient's co-morbid conditions and/or prevent weight-related complications.  - S/P tubal ligation.  - Struggling with appetite and interested in starting weight loss medication to assist with that.   - FDA approved weight loss medications reviewed: Wegovy, Saxenda, Zepbound, Qsymia, Contrave, and Phentermine. Wegovy and Saxenda shortage discussed. Off label use of medications discussed.   - Previously on phentermine, but will avoid this due to history of palpitations and abnormal stress ECG - had cardiac cath, which was unremarkable.   - Discussed Topamax and Wellbutrin/Naltrexone, but she is concerned about mood changes with those medications.    - Not currently interested in metformin.   - She is interested in GLP-1 therapy.  - Zepbound not covered.   - She made an informed decision to start Wegovy.  - Patient denies personal history of pancreatitis. Patient also denies personal and family history of thyroid cancer and multiple endocrine neoplasia type 2 (MEN 2 tumor).   - Side effects of Wegovy discussed: nausea, vomiting, diarrhea, and constipation. If severe abdominal pain develops, stop Wegovy and go to the ER, as this could be pancreatitis. Monitor heart rate while on Wegovy and if resting heart rate greater than 100 beats per minute, patient will notify me.   - If you need to have surgery or another procedure, such as an upper endoscopy or  colonoscopy, please contact my office as often medications like Wegovy need to be held for a certain amount of time prior to a procedure.   - Labs reviewed: TSH 1/17/2024. A1C and lipid 8/23/2023. LDL elevated, which will likely improve with weight loss. Remainder of the blood work within acceptable range.  - check CMP and fasting insulin.      Goals:  Do not skip meals. Get protein with each meal. Can supplement with protein shakes or bars.   Food log (ie.) www.myfitnesspal.com,sparkpeople.com,loseit.com,calorieking.com,etc. baritastic (use skinnytaste.com, Zapproved or smartphone andi SoundCloud for recipes)  No sugary beverages. At least 64oz of water daily.  Increase physical activity by 10 minutes daily. Gradually increase physical activity to a goal of 5 days per week for 30 minutes of MODERATE intensity PLUS 2 days per week of FULL BODY resistance training (use smartphone apps ScanCafe, Home Workout, etc.)  Start food logging, weighing, and measuring food.   2751-0586 calories per day. Sample menu given.   Gradually increase water intake to goal of at least 64 oz daily.  Gradually increase walking to goal of 5 days per week for 30 minutes and 2 days per week resistance training.   Start Wegovy.

## 2024-02-12 NOTE — TELEPHONE ENCOUNTER
PA for Wegovy Approved   Date(s) approved  February 8, 2024 to February 8, 2025   Case #     Patient advised by [x] Wantrhart Message                      [] Phone call       Pharmacy advised by [x]Fax                                     []Phone call    Approval letter scanned into Media Yes

## 2024-02-21 ENCOUNTER — HOSPITAL ENCOUNTER (OUTPATIENT)
Dept: INFUSION CENTER | Facility: CLINIC | Age: 54
Discharge: HOME | End: 2024-02-21
Payer: COMMERCIAL

## 2024-02-21 DIAGNOSIS — D50.9 IRON DEFICIENCY ANEMIA, UNSPECIFIED IRON DEFICIENCY ANEMIA TYPE: Primary | ICD-10-CM

## 2024-02-21 DIAGNOSIS — K90.9 MALABSORPTION SYNDROME: ICD-10-CM

## 2024-02-21 DIAGNOSIS — D50.0 IRON DEFICIENCY ANEMIA SECONDARY TO BLOOD LOSS (CHRONIC): ICD-10-CM

## 2024-02-21 DIAGNOSIS — E53.8 B12 DEFICIENCY: ICD-10-CM

## 2024-02-21 DIAGNOSIS — D50.8 OTHER IRON DEFICIENCY ANEMIA: ICD-10-CM

## 2024-02-21 PROCEDURE — 96372 THER/PROPH/DIAG INJ SC/IM: CPT

## 2024-02-21 RX ORDER — CYANOCOBALAMIN 1000 UG/ML
1000 INJECTION, SOLUTION INTRAMUSCULAR; SUBCUTANEOUS ONCE
OUTPATIENT
Start: 2024-03-15 | End: 2024-03-15

## 2024-02-21 RX ORDER — CYANOCOBALAMIN 1000 UG/ML
1000 INJECTION, SOLUTION INTRAMUSCULAR; SUBCUTANEOUS ONCE
Status: COMPLETED | OUTPATIENT
Start: 2024-02-21 | End: 2024-02-21

## 2024-02-21 RX ADMIN — CYANOCOBALAMIN 1000 MCG: 1000 INJECTION, SOLUTION INTRAMUSCULAR at 12:38

## 2024-02-21 NOTE — PROGRESS NOTES
Pt presents for B12 administered in [place]. No new meds or concerns. Pt tolerated treatment without adverse reaction. Future appointments discussed, confirmed with patient for 3/20/2024 1230. AVS declined.

## 2024-03-04 ENCOUNTER — OFFICE VISIT (OUTPATIENT)
Dept: GASTROENTEROLOGY | Facility: MEDICAL CENTER | Age: 54
End: 2024-03-04
Payer: COMMERCIAL

## 2024-03-04 VITALS
TEMPERATURE: 96.3 F | HEART RATE: 50 BPM | SYSTOLIC BLOOD PRESSURE: 121 MMHG | BODY MASS INDEX: 27.03 KG/M2 | WEIGHT: 147.8 LBS | DIASTOLIC BLOOD PRESSURE: 79 MMHG

## 2024-03-04 DIAGNOSIS — Z11.59 ENCOUNTER FOR HEPATITIS C SCREENING TEST FOR LOW RISK PATIENT: ICD-10-CM

## 2024-03-04 DIAGNOSIS — Z98.84 STATUS POST BARIATRIC SURGERY: ICD-10-CM

## 2024-03-04 DIAGNOSIS — K21.9 GASTROESOPHAGEAL REFLUX DISEASE WITHOUT ESOPHAGITIS: ICD-10-CM

## 2024-03-04 DIAGNOSIS — K59.04 CHRONIC IDIOPATHIC CONSTIPATION: Primary | ICD-10-CM

## 2024-03-04 DIAGNOSIS — Z12.11 SCREENING FOR COLON CANCER: ICD-10-CM

## 2024-03-04 PROCEDURE — 99214 OFFICE O/P EST MOD 30 MIN: CPT | Performed by: INTERNAL MEDICINE

## 2024-03-04 RX ORDER — LUBIPROSTONE 24 UG/1
24 CAPSULE ORAL 2 TIMES DAILY WITH MEALS
Qty: 90 CAPSULE | Refills: 0 | Status: SHIPPED | OUTPATIENT
Start: 2024-03-04

## 2024-03-04 NOTE — PROGRESS NOTES
[facility]  Outpatient Follow-up  Encounter: [id number]    PATIENT INFO     Name: Bhavna Timmons  YOB: 1970   Age: 53 y.o.   Sex: female   MRN: [id number]    ASSESSMENT & PLAN     Problems Addressed this Visit:   1. Chronic idiopathic constipation    2. Gastroesophageal reflux disease without esophagitis    3. Status post bariatric surgery    4. Other iron deficiency anemia    5. Encounter for hepatitis C screening test for low risk patient      Orders Placed This Encounter   Procedures    Hepatitis C antibody     # Chronic Idiopathic Constipation: Patient with long standing history of constipation. Previously on Linzess 290 mcg with significant improvement in bowel movements. However, medication too expensive to continue despite insurance. Patient now taking Miralax 17 g daily with mild improvement but still without daily BM and often must strain. Patient has also previously been on Amitiza with improvement. At this time, will send in Linzess prescription to [facility] along with Amitiza to price check. If more affordable, will plan to transition to one of these two medication. If not, recommend increased dosing of Miralax and increased water/fiber intake.     Plan:  Script for both Linzess 290 mcg daily and Amitiza 24 mcg daily sent for price check   If medications not covered, increase to Miralax 17 mg twice daily  Encouraged increased intake of both water and fiber    # Gastroesophageal Reflux Disease (GERD): Patient with history of GERD - currently well controlled with lifestyle modifications and PRN Pepcid. GERD developed following Sleeve gastrectomy, previously uncontrolled but patient now only with occasional symptoms after changing diet. Patient S/P EGD 11/2023 and will require repeat EGD 11/2026 for continued BE surveillance following sleeve surgery.     Plan:  Okay to continue on Pepcid 20 mg BID PRN   No additional testing at this time; repeat EGD in  2026   Discussed the importance of lifestyle modifications including:  Recommend small meals and avoidance of ulcerogenic foods   Avoid eating prior to bedtime, elevate head of bed at night  Limit intake of alcohol and caffeine   Okay to utilize Tylenol but avoid use of all NSAIDs  Daily exercise to promote weight loss    # Status Post Bariatric Surgery: Prior history of Sleeve gastrectomy 7/2021 in [place]. Previously 200 lbs - patient with subsequent weight loss with thao weight os 136 lbs. Weight in office today 147 lbs. Currently follows with weight management/bariatrics and recently started on Wegovy.     # Screening for Colon Cancer: Patient up to date on colon cancer screening. S/P Cologuard 11/2022. Currently without alarm symptoms. Next colon cancer screening due 11/2025.     # Encounter for Hepatitis C: No prior history of Hepatitis C and patient without high risk factors. Hep C antibody ordered to evaluate for one time screening.     FOLLOW-UP: Schedule follow up in 1 year    HISTORY OF PRESENT ILLNESS       Bhavna Timmons is a 53 y.o. female who presents to GI office for follow up of her reflux and constipation. Patient with PMHx of GERD, constipation, HLD, KAREN, sarcoidosis, and prior sleeve gastrectomy.     Patient was previously seen and evaluated in office on 11/29/2023.  At that time, patient complained of reflux and heartburn.  Patient was told to begin Pepcid when she had mild symptoms and Prilosec when she had more severe symptoms.  She had also complained of constipation was started on Linzess 290 mcg daily which she was told to take in the morning prior to going to work.    During today's visit, patient states that she is feeling well overall.  Admits to significant improvement in her upper reflux symptoms.  States that she has not taken Prilosec and will only occasionally take Pepcid a couple times a week.  Patient admits that she recently changed her diet.  Explains that she is now eating  smaller volumes, more frequent meals.  Also has eliminated carbs and heavy meats from her diet.  This allowed her to discontinue the Prilosec and only take Pepcid occasionally.  Only occasional symptom at this time is that of mild bloating and belching.    In terms of patient's constipation, patient states that she had been taking Linzess 290 mcg's daily.  With this dose, patient admits to significant improvement in her constipation.  States that she would have bowel movements daily.  Patient was given samples during prior office visit.  Explains that medication was too expensive when she went to pick it up from her pharmacy.  Therefore, patient not currently taking Linzess.  Instead, patient is taking MiraLAX 17 g daily.  Admits to mild improvement with MiraLAX.  Explains that she will have a bowel movement every other day but does admit to occasional constipation with need for straining.    Answers submitted by the patient for this visit:  Abdominal Pain Questionnaire (Submitted on 3/4/2024)  Chief Complaint: Abdominal pain  anorexia: No  arthralgias: No  belching: Yes  constipation: Yes  diarrhea: No  dysuria: No  fever: No  flatus: No  frequency: No  headaches: No  hematochezia: No  hematuria: No  melena: No  myalgias: No  nausea: No  weight loss: No  vomiting: No     ENDOSCOPIC HISTORY     UPPER ENDOSCOPY - 10/2/23: Small type 1 HH. Healthy sleeve gastrectomy. Esophageal and gastric biopsies negative.    24 HOUR PH - 10/2/23: 24 hour study completed after patient had been off PPI therapy x14 days. Acid exposure time in the distal esophagus: 1.5% in the upright position and 4.9% in the recumbent. DeeMester score is 13. Overall, patient has borderline positive acid reflux disease.     ESOPHAGEAL MANOMETRY - 10/2/23: Normal esophageal contractility pattern with mean . Mediam IRP within normal limits. Impedance with 80% complete clearance of liquid bolus.     COLONOSCOPy - 5/26/2018: Hyperplastic polyps,  sigmoid and ascending colon diverticulosis, and hemorrhoids. Recall recommended in x3 years.     REVIEW OF SYSTEMS     CONSTITUTIONAL: Denies any fever, chills, rigors, and weight loss  HEENT: No earache or tinnitus, denies hearing loss or visual disturbances  CARDIOVASCULAR: No chest pain or palpitations  RESPIRATORY: Denies any cough, hemoptysis, shortness of breath or dyspnea on exertion  GASTROINTESTINAL: As noted in the History of Present Illness  GENITOURINARY: No problems with urination, denies any hematuria or dysuria  NEUROLOGIC: No dizziness or vertigo, denies headaches   MUSCULOSKELETAL: Denies any muscle or joint pain   SKIN: Denies skin rashes or itching  ENDOCRINE: Denies excessive thirst, denies intolerance to heat or cold  PSYCHOSOCIAL: Denies depression or anxiety, denies any recent memory loss     Historical Information   Past Medical History:   Diagnosis Date    Anemia     HLD (hyperlipidemia)     Other hyperlipidemia 2022    Palpitations 2021    Sarcoidosis      Past Surgical History:   Procedure Laterality Date    ANKLE SURGERY Right 2018    COLONOSCOPY  2018    GASTRECTOMY SLEEVE LAPAROSCOPIC  2021    KNEE ARTHROSCOPY W/ MENISCAL REPAIR Left 2013    TUBAL LIGATION      UPPER GASTROINTESTINAL ENDOSCOPY       Social History   Social History     Substance and Sexual Activity   Alcohol Use Not Currently    Comment: socially     Social History     Substance and Sexual Activity   Drug Use Never     Social History     Tobacco Use   Smoking Status Former    Current packs/day: 0.00    Average packs/day: 0.3 packs/day for 27.0 years (6.8 ttl pk-yrs)    Types: Cigarettes    Start date:     Quit date: 2016    Years since quittin.1   Smokeless Tobacco Never   Tobacco Comments    Patient was a on and off smoker     Family History   Problem Relation Age of Onset    Dementia Mother     Seizures Mother     Hypertension Mother     Osteoporosis Mother     Glaucoma Mother     Macular  degeneration Mother     Hearing loss Mother     Hyperlipidemia Mother     No Known Problems Father     No Known Problems Sister     No Known Problems Daughter     No Known Problems Daughter     Heart disease Maternal Aunt     Diabetes Maternal Aunt     No Known Problems Maternal Grandmother     No Known Problems Maternal Grandfather     No Known Problems Paternal Grandmother     No Known Problems Paternal Grandfather          MEDICATIONS AND ALLERGIES     Current Outpatient Medications   Medication Instructions    Cholecalciferol (D3 High Potency) 50 MCG (2000 UT) CAPS No dose, route, or frequency recorded.    estradiol (VAGIFEM, YUVAFEM) 10 mcg, Vaginal, 2 times weekly    famotidine (PEPCID) 20 mg, Oral, 2 times daily    folic acid (FOLVITE) 400 mcg, Oral, Daily    linaCLOtide 290 MCG CAPS 1 capsule, Oral, Daily before breakfast    lubiprostone (AMITIZA) 24 mcg, Oral, 2 times daily with meals    Multiple Vitamins-Minerals (MULTIVITAMIN ADULTS 50+ PO) Oral, Daily    NON FORMULARY Oral, Daily, Magnesium -calcium vitamin d3     other medication, see sig, Medication/product name: testosterone cream<BR>Strength: 4mg/ml<BR>Sig (include dose, route, frequency): apply 0.5 ml to labia daily    Semaglutide-Weight Management (WEGOVY) 0.25 mg, Subcutaneous, Weekly     Allergies   Allergen Reactions    Penicillin G Hives       PHYSICAL EXAM      Objective   Blood pressure 121/79, pulse (!) 50, temperature (!) 96.3 °F (35.7 °C), weight 67 kg (147 lb 12.8 oz). Body mass index is 27.03 kg/m².    General Appearance:   Alert, cooperative, no distress   HEENT:   Normocephalic, atraumatic, anicteric     Neck:   Supple, symmetrical, trachea midline   Lungs:   Equal chest rise, respirations unlabored    Heart:   Regular rate and rhythm   Abdomen:   Soft, non-tender, non-distended; normal bowel sounds; no masses, no organomegaly    Rectal:   Deferred    Extremities:   No cyanosis, clubbing or edema    Neuro:   Moves all 4 extremities     Skin:   No jaundice, rashes, or lesions      LABORATORY RESULTS     No visits with results within 1 Day(s) from this visit.   Latest known visit with results is:   Appointment on 01/17/2024   Component Date Value    FSH 01/17/2024 12.0     ESTRADIOL 01/17/2024 79.0     Progesterone 01/17/2024 0.71     T3, Free 01/17/2024 3.13     DHEA-SO4 01/17/2024 43.0     Testosterone 01/17/2024 <10     Cortisol - AM 01/17/2024 17.2     TSH 3RD GENERATON 01/17/2024 1.693      No results found.    RADIOLOGY RESULTS: I have personally reviewed pertinent imaging studies.      Ana Booth DO  Gastroenterology Fellow  [facility]  Division of Gastroenterology & Hepatology  Available on TigerText    ** Please Note: This note is constructed using a voice recognition dictation system. **

## 2024-03-05 ENCOUNTER — TELEPHONE (OUTPATIENT)
Dept: BARIATRICS | Facility: CLINIC | Age: 54
End: 2024-03-05

## 2024-03-05 DIAGNOSIS — E66.3 OVERWEIGHT: Primary | ICD-10-CM

## 2024-03-05 DIAGNOSIS — Z86.39 HISTORY OF OBESITY: ICD-10-CM

## 2024-03-05 DIAGNOSIS — Z90.3 HISTORY OF SLEEVE GASTRECTOMY: ICD-10-CM

## 2024-03-05 NOTE — TELEPHONE ENCOUNTER
Pt asking for a dose increase , no issues with previous dose. Pt asking to send the script to [facility].

## 2024-03-06 ENCOUNTER — TELEPHONE (OUTPATIENT)
Dept: BARIATRICS | Facility: CLINIC | Age: 54
End: 2024-03-06

## 2024-03-06 DIAGNOSIS — Z86.39 HISTORY OF OBESITY: ICD-10-CM

## 2024-03-06 DIAGNOSIS — E66.3 OVERWEIGHT: ICD-10-CM

## 2024-03-06 DIAGNOSIS — Z90.3 HISTORY OF SLEEVE GASTRECTOMY: ICD-10-CM

## 2024-03-08 ENCOUNTER — TELEPHONE (OUTPATIENT)
Age: 54
End: 2024-03-08

## 2024-03-08 DIAGNOSIS — D50.0 IRON DEFICIENCY ANEMIA SECONDARY TO BLOOD LOSS (CHRONIC): Primary | ICD-10-CM

## 2024-03-08 NOTE — TELEPHONE ENCOUNTER
PA for wegovy    Submitted via    []CMM-KEY    [x][facility]er-Case ID # [id number]   []Faxed to plan   []Other website    []Phone call Case ID #      Office notes sent, clinical questions answered. Awaiting determination    Turnaround time for your insurance to make a decision on your Prior Authorization can take 7-21 business days.

## 2024-03-11 NOTE — TELEPHONE ENCOUNTER
PA for wegovy Approved   PA Case: [id number], Status: Closed, Closed Reason Code: CF Prior Authorization duplicate/approved, Closed Rationale: The requested medication has been approved by [facility] and there is an active authorization on file. If this is a request for renewal, please resubmit within 30 days from the current authorization expiration date. Prior Authorization Case ID: [id number] Approval duration: From 02/08/2024 to 02/08/2025   PLEASE NOTE: Last filled on 02/23/24. Refill Payable on or after 03/15/24.  . Questions? Contact [phone].     Patient advised by [x] [employer]) Message                      [x] Phone call       Pharmacy advised by [x]Fax                                     []Phone call    Approval letter scanned into Media Yes

## 2024-03-20 ENCOUNTER — HOSPITAL ENCOUNTER (OUTPATIENT)
Dept: INFUSION CENTER | Facility: CLINIC | Age: 54
Discharge: HOME | End: 2024-03-20
Payer: COMMERCIAL

## 2024-03-20 DIAGNOSIS — K90.9 MALABSORPTION SYNDROME: ICD-10-CM

## 2024-03-20 DIAGNOSIS — D50.8 OTHER IRON DEFICIENCY ANEMIA: ICD-10-CM

## 2024-03-20 DIAGNOSIS — D50.0 IRON DEFICIENCY ANEMIA SECONDARY TO BLOOD LOSS (CHRONIC): ICD-10-CM

## 2024-03-20 DIAGNOSIS — D50.9 IRON DEFICIENCY ANEMIA, UNSPECIFIED IRON DEFICIENCY ANEMIA TYPE: Primary | ICD-10-CM

## 2024-03-20 DIAGNOSIS — E53.8 B12 DEFICIENCY: ICD-10-CM

## 2024-03-20 PROCEDURE — 96372 THER/PROPH/DIAG INJ SC/IM: CPT

## 2024-03-20 RX ORDER — CYANOCOBALAMIN 1000 UG/ML
1000 INJECTION, SOLUTION INTRAMUSCULAR; SUBCUTANEOUS ONCE
Status: COMPLETED | OUTPATIENT
Start: 2024-03-20 | End: 2024-03-20

## 2024-03-20 RX ORDER — CYANOCOBALAMIN 1000 UG/ML
1000 INJECTION, SOLUTION INTRAMUSCULAR; SUBCUTANEOUS ONCE
OUTPATIENT
Start: 2024-04-12 | End: 2024-04-12

## 2024-03-20 RX ADMIN — CYANOCOBALAMIN 1000 MCG: 1000 INJECTION, SOLUTION INTRAMUSCULAR at 12:41

## 2024-03-20 NOTE — PROGRESS NOTES
B12 injection given without incident. Denies need for AVS, aware of next scheduled appointment on 4/17 at 1200

## 2024-03-21 ENCOUNTER — TELEPHONE (OUTPATIENT)
Age: 54
End: 2024-03-21

## 2024-04-09 ENCOUNTER — TELEPHONE (OUTPATIENT)
Dept: BARIATRICS | Facility: CLINIC | Age: 54
End: 2024-04-09

## 2024-04-09 DIAGNOSIS — Z90.3 HISTORY OF SLEEVE GASTRECTOMY: ICD-10-CM

## 2024-04-09 DIAGNOSIS — Z86.39 HISTORY OF OBESITY: ICD-10-CM

## 2024-04-09 DIAGNOSIS — E66.3 OVERWEIGHT: Primary | ICD-10-CM

## 2024-04-09 DIAGNOSIS — R11.0 NAUSEA: ICD-10-CM

## 2024-04-09 RX ORDER — ONDANSETRON 4 MG/1
4 TABLET, FILM COATED ORAL EVERY 8 HOURS PRN
Qty: 20 TABLET | Refills: 0 | Status: SHIPPED | OUTPATIENT
Start: 2024-04-09

## 2024-04-09 RX ORDER — SEMAGLUTIDE 1 MG/.5ML
1 INJECTION, SOLUTION SUBCUTANEOUS WEEKLY
Qty: 2 ML | Refills: 0 | Status: SHIPPED | OUTPATIENT
Start: 2024-04-09

## 2024-04-09 NOTE — TELEPHONE ENCOUNTER
Pt asking for a dose increase she currently on 5mg of Wegovy and no issues and will like 1mg and if possible send in Zofran as well.

## 2024-04-10 ENCOUNTER — CLINICAL SUPPORT (OUTPATIENT)
Dept: BARIATRICS | Facility: CLINIC | Age: 54
End: 2024-04-10

## 2024-04-10 VITALS
RESPIRATION RATE: 17 BRPM | BODY MASS INDEX: 26.2 KG/M2 | HEART RATE: 59 BPM | SYSTOLIC BLOOD PRESSURE: 122 MMHG | DIASTOLIC BLOOD PRESSURE: 70 MMHG | WEIGHT: 142.4 LBS | TEMPERATURE: 96.6 F | HEIGHT: 62 IN

## 2024-04-10 DIAGNOSIS — R63.5 ABNORMAL WEIGHT GAIN: Primary | ICD-10-CM

## 2024-04-10 PROCEDURE — RECHECK

## 2024-04-10 NOTE — PROGRESS NOTES
Patient last visit weight:152lb  Patient current visit weight:142.4lb    If you are taking phentermine or other oral weight loss medications, are you experiencing any of the following symptoms:  Headache:   Blurred Vision:   Chest Pain:   Palpitations:  Insomnia:   SPECIFY ORAL MEDICATION AND DOSAGE:     If you are taking an injectable medication,  are you experiencing any of the following symptoms:  Bloating: NO  Nausea: NO   Vomiting: NO  Constipation: NO  Diarrhea:NO  SPECIFY INJECTABLE MEDICATION AND CURRENT DOSAGE: Wegovy 0.5mg      Vitals:    Is BP less than 100/60? NO  Is BP greater than 140/90? NO  Is HR greater than 100? NO  **If yes to any of the above, have patient relax and repeat in 5-10 minutes**    Repeat values:    Is BP less than 100/60?  Is BP greater than 140/90?  Is HR greater than 100?  **If values remain outside of ranges above, please consult provider for next steps**

## 2024-05-06 ENCOUNTER — HOSPITAL ENCOUNTER (OUTPATIENT)
Dept: INFUSION CENTER | Facility: CLINIC | Age: 54
Discharge: HOME | End: 2024-05-06
Payer: COMMERCIAL

## 2024-05-06 DIAGNOSIS — E53.8 B12 DEFICIENCY: ICD-10-CM

## 2024-05-06 DIAGNOSIS — K90.9 MALABSORPTION SYNDROME: ICD-10-CM

## 2024-05-06 DIAGNOSIS — D50.8 OTHER IRON DEFICIENCY ANEMIA: ICD-10-CM

## 2024-05-06 DIAGNOSIS — D50.0 IRON DEFICIENCY ANEMIA SECONDARY TO BLOOD LOSS (CHRONIC): ICD-10-CM

## 2024-05-06 DIAGNOSIS — D50.9 IRON DEFICIENCY ANEMIA, UNSPECIFIED IRON DEFICIENCY ANEMIA TYPE: Primary | ICD-10-CM

## 2024-05-06 PROCEDURE — 96372 THER/PROPH/DIAG INJ SC/IM: CPT

## 2024-05-06 RX ORDER — CYANOCOBALAMIN 1000 UG/ML
1000 INJECTION, SOLUTION INTRAMUSCULAR; SUBCUTANEOUS ONCE
OUTPATIENT
Start: 2024-05-15 | End: 2024-05-15

## 2024-05-06 RX ORDER — CYANOCOBALAMIN 1000 UG/ML
1000 INJECTION, SOLUTION INTRAMUSCULAR; SUBCUTANEOUS ONCE
Status: COMPLETED | OUTPATIENT
Start: 2024-05-06 | End: 2024-05-06

## 2024-05-06 RX ADMIN — CYANOCOBALAMIN 1000 MCG: 1000 INJECTION INTRAMUSCULAR; SUBCUTANEOUS at 12:17

## 2024-05-06 NOTE — PLAN OF CARE
Problem: SAFETY ADULT  Goal: Patient will remain free of falls  Description: INTERVENTIONS:  - Educate patient/family on patient safety including physical limitations  - Instruct patient to call for assistance with activity   - Consult OT/PT to assist with strengthening/mobility   - Keep Call bell within reach  - Keep bed low and locked with side rails adjusted as appropriate  - Keep care items and personal belongings within reach  - Initiate and maintain comfort rounds  - Consider moving patient to room near nurses station  Outcome: Progressing

## 2024-05-06 NOTE — PROGRESS NOTES
Bhavna Timmons  tolerated B12 into R arm well with no complications.      Bhavna Timmons is aware of future appt on 6/3/24 @1230    AVS declined by Bhavna Timmons.

## 2024-05-09 ENCOUNTER — TELEPHONE (OUTPATIENT)
Dept: HEMATOLOGY ONCOLOGY | Facility: CLINIC | Age: 54
End: 2024-05-09

## 2024-05-09 DIAGNOSIS — E53.8 B12 DEFICIENCY: ICD-10-CM

## 2024-05-09 DIAGNOSIS — D50.0 IRON DEFICIENCY ANEMIA SECONDARY TO BLOOD LOSS (CHRONIC): Primary | ICD-10-CM

## 2024-05-09 NOTE — TELEPHONE ENCOUNTER
Lab Inquiry   Who are you speaking with? Patient     If it is not the patient, are they listed on an active communication consent form? N/A   Name of ordering provider Dr. Das   What is being requested? Lab orders need to be entered for appt on 06/26/2024.   Lab draw location [facility]   What is the best call back number? [phone]   If patient at the lab, Was a live attempts to contact the team made? N/A

## 2024-06-03 ENCOUNTER — HOSPITAL ENCOUNTER (OUTPATIENT)
Dept: INFUSION CENTER | Facility: CLINIC | Age: 54
Discharge: HOME | End: 2024-06-03

## 2024-06-03 ENCOUNTER — HOSPITAL ENCOUNTER (OUTPATIENT)
Dept: RADIOLOGY | Facility: HOSPITAL | Age: 54
Discharge: HOME | End: 2024-06-03
Payer: COMMERCIAL

## 2024-06-03 DIAGNOSIS — D50.0 IRON DEFICIENCY ANEMIA SECONDARY TO BLOOD LOSS (CHRONIC): ICD-10-CM

## 2024-06-03 DIAGNOSIS — K90.9 MALABSORPTION SYNDROME: ICD-10-CM

## 2024-06-03 DIAGNOSIS — K91.2 POSTSURGICAL MALABSORPTION: ICD-10-CM

## 2024-06-03 DIAGNOSIS — D50.8 OTHER IRON DEFICIENCY ANEMIA: ICD-10-CM

## 2024-06-03 DIAGNOSIS — E53.8 B12 DEFICIENCY: ICD-10-CM

## 2024-06-03 DIAGNOSIS — Z98.84 BARIATRIC SURGERY STATUS: ICD-10-CM

## 2024-06-03 DIAGNOSIS — D50.9 IRON DEFICIENCY ANEMIA, UNSPECIFIED IRON DEFICIENCY ANEMIA TYPE: Primary | ICD-10-CM

## 2024-06-03 DIAGNOSIS — Z48.815 ENCOUNTER FOR SURGICAL AFTERCARE FOLLOWING SURGERY OF DIGESTIVE SYSTEM: ICD-10-CM

## 2024-06-03 PROCEDURE — 74240 X-RAY XM UPR GI TRC 1CNTRST: CPT

## 2024-06-03 RX ORDER — CYANOCOBALAMIN 1000 UG/ML
1000 INJECTION, SOLUTION INTRAMUSCULAR; SUBCUTANEOUS ONCE
Status: DISCONTINUED | OUTPATIENT
Start: 2024-06-03 | End: 2024-06-06 | Stop reason: HOSPADM

## 2024-06-03 RX ORDER — CYANOCOBALAMIN 1000 UG/ML
1000 INJECTION, SOLUTION INTRAMUSCULAR; SUBCUTANEOUS ONCE
OUTPATIENT
Start: 2024-06-12 | End: 2024-06-12

## 2024-06-03 NOTE — PROGRESS NOTES
Pt presents for B12. No new meds or concerns. Pt tolerated treatment without adverse reaction. Future appointments discussed, confirmed with patient for 7/1/24 1230. AVS declined.

## 2024-06-24 ENCOUNTER — TELEPHONE (OUTPATIENT)
Dept: HEMATOLOGY ONCOLOGY | Facility: CLINIC | Age: 54
End: 2024-06-24

## 2024-06-24 NOTE — TELEPHONE ENCOUNTER
Spoke with patient regarding appointment change, patient is only available on Monday or Tuesdays after 330 as she works in the same building, scheduled for next available Tuesday 340 appointment on 8/6. Pt verbalized understanding.

## 2024-07-12 DIAGNOSIS — N95.1 MENOPAUSAL SYMPTOMS: ICD-10-CM

## 2024-07-12 DIAGNOSIS — N95.1 MENOPAUSAL SYMPTOMS: Primary | ICD-10-CM

## 2024-07-12 RX ORDER — PROGESTERONE 200 MG/1
200 CAPSULE ORAL
Qty: 30 CAPSULE | Refills: 11 | Status: SHIPPED | OUTPATIENT
Start: 2024-07-12 | End: 2024-07-12 | Stop reason: SDUPTHER

## 2024-07-12 RX ORDER — PROGESTERONE 200 MG/1
200 CAPSULE ORAL
Qty: 30 CAPSULE | Refills: 11 | Status: SHIPPED | OUTPATIENT
Start: 2024-07-12

## 2024-07-12 RX ORDER — ESTRADIOL 1 MG/1
1 TABLET ORAL DAILY
Qty: 30 TABLET | Refills: 11 | Status: SHIPPED | OUTPATIENT
Start: 2024-07-12 | End: 2024-07-12 | Stop reason: SDUPTHER

## 2024-07-12 RX ORDER — ESTRADIOL 1 MG/1
1 TABLET ORAL DAILY
Qty: 30 TABLET | Refills: 11 | Status: SHIPPED | OUTPATIENT
Start: 2024-07-12

## 2024-07-19 ENCOUNTER — OFFICE VISIT (OUTPATIENT)
Dept: BARIATRICS | Facility: CLINIC | Age: 54
End: 2024-07-19
Payer: COMMERCIAL

## 2024-07-19 VITALS
BODY MASS INDEX: 25.36 KG/M2 | RESPIRATION RATE: 7 BRPM | SYSTOLIC BLOOD PRESSURE: 127 MMHG | DIASTOLIC BLOOD PRESSURE: 60 MMHG | WEIGHT: 137.8 LBS | HEART RATE: 52 BPM | HEIGHT: 62 IN | TEMPERATURE: 97.6 F

## 2024-07-19 DIAGNOSIS — E66.3 OVERWEIGHT: Primary | ICD-10-CM

## 2024-07-19 DIAGNOSIS — Z86.39 HISTORY OF OBESITY: ICD-10-CM

## 2024-07-19 DIAGNOSIS — Z90.3 HISTORY OF SLEEVE GASTRECTOMY: ICD-10-CM

## 2024-07-19 PROCEDURE — 99213 OFFICE O/P EST LOW 20 MIN: CPT | Performed by: PHYSICIAN ASSISTANT

## 2024-07-19 NOTE — ASSESSMENT & PLAN NOTE
- S/P Vertical Sleeve Gastrectomy with Dr. Ricky Centeno in [facility] and [facility] in [place] in 07/2021. Weight prior to surgery 200 lbs, thao 136 lbs.  - Patient is pursuing Conservative Program  - Initial weight loss goal of 5-10% weight loss for improved health  - Weight loss can improve patient's co-morbid conditions and/or prevent weight-related complications.  - Labs reviewed: TSH 1/17/2024. A1C and lipid 8/23/2023. LDL elevated, which will likely improve with weight loss. Remainder of the blood work within acceptable range.    Goals:  -continue with current exercise  -discussed increasing water intake  -to make sure getting proper amounts of protein and fiber in the diet.     To restart on wegovy.  She was interested in zepbound but even lowest dose of zepbound would produce more weight loss than patient/I would be comfortable with.  To restart wegovy and closely monitor weight.  Discussed if losing too much weight to consider pill option such as wellbutrin, topamax or phentermine 15mg.    - Previously on phentermine, but will avoid this due to history of palpitations and abnormal stress ECG - had cardiac cath, which was unremarkable.   - Discussed Topamax and Wellbutrin/Naltrexone, but she is concerned about mood changes with those medications.    - S/P tubal ligation.

## 2024-07-19 NOTE — PROGRESS NOTES
Assessment/Plan:    Overweight  - S/P Vertical Sleeve Gastrectomy with Dr. Ricky Centeno in [facility] in [place] in 07/2021. Weight prior to surgery 200 lbs, thao 136 lbs.  - Patient is pursuing Conservative Program  - Initial weight loss goal of 5-10% weight loss for improved health  - Weight loss can improve patient's co-morbid conditions and/or prevent weight-related complications.  - Labs reviewed: TSH 1/17/2024. A1C and lipid 8/23/2023. LDL elevated, which will likely improve with weight loss. Remainder of the blood work within acceptable range.    Goals:  -continue with current exercise  -discussed increasing water intake  -to make sure getting proper amounts of protein and fiber in the diet.     To restart on wegovy.  She was interested in zepbound but even lowest dose of zepbound would produce more weight loss than patient/I would be comfortable with.  To restart wegovy and closely monitor weight.  Discussed if losing too much weight to consider pill option such as wellbutrin, topamax or phentermine 15mg.    - Previously on phentermine, but will avoid this due to history of palpitations and abnormal stress ECG - had cardiac cath, which was unremarkable.   - Discussed Topamax and Wellbutrin/Naltrexone, but she is concerned about mood changes with those medications.    - S/P tubal ligation.          Return in about 6 months (around 1/19/2025) for 1 month nurse visit.       Diagnoses and all orders for this visit:    Overweight  -     Semaglutide-Weight Management (WEGOVY) 0.25 MG/0.5ML; Inject 0.5 mL (0.25 mg total) under the skin once a week    History of sleeve gastrectomy  -     Semaglutide-Weight Management (WEGOVY) 0.25 MG/0.5ML; Inject 0.5 mL (0.25 mg total) under the skin once a week    History of obesity  -     Semaglutide-Weight Management (WEGOVY) 0.25 MG/0.5ML; Inject 0.5 mL (0.25 mg total) under the skin once a week          Subjective:   Chief Complaint   Patient presents  with    Follow-up     MWM-4M F/u; Waist-30in        Patient ID: Bhavna Timmons  is a 54 y.o. female with excess weight/obesity here to pursue weight managment.  Patient is pursuing Conservative Program.     HPI  s/p Vertical Sleeve Gastrectomy with Dr. Ricky Centeno in [facility] in [place] in 07/2021. She was on wegovy 1mg but was losing a lot of weight.  She stopped the medication but then regained weight.  She gained 7lb since she stopped the medcation.  She is getting more food cravings now.  She would now like to restart mediction  Wt Readings from Last 10 Encounters:   07/19/24 62.5 kg (137 lb 12.8 oz)   04/10/24 64.6 kg (142 lb 6.4 oz)   03/04/24 67 kg (147 lb 12.8 oz)   02/09/24 69.1 kg (152 lb 6.4 oz)   01/25/24 69.9 kg (154 lb)   11/29/23 69.4 kg (153 lb)   11/21/23 68.5 kg (151 lb)   10/02/23 69.1 kg (152 lb 5.4 oz)   08/23/23 69.1 kg (152 lb 6.4 oz)   07/24/23 70.3 kg (155 lb)       Food logging:  Increased appetite/cravings:  Exercise:walking 30 minutes a day when working and gym on days off.    Hydration: water not at goal      The following portions of the patient's history were reviewed and updated as appropriate: She  has a past medical history of Anemia, HLD (hyperlipidemia), Other hyperlipidemia (1/12/2022), Palpitations (2/17/2021), and Sarcoidosis.  She   Patient Active Problem List    Diagnosis Date Noted    Overweight 02/09/2024    History of obesity 02/09/2024    Gastroesophageal reflux disease without esophagitis 07/24/2023    History of sarcoidosis 07/24/2023    Folate deficiency 07/24/2023    B12 deficiency 07/24/2023    Absolute anemia 07/24/2023    Varicose veins of both lower extremities with pain 06/27/2023    Iron deficiency anemia secondary to blood loss (chronic) 02/25/2022    Status post bariatric surgery 02/25/2022    Malabsorption syndrome 02/25/2022    Iron deficiency anemia 01/12/2022    Chronic idiopathic constipation 05/10/2021    Abnormal stress ECG  04/02/2021    Chest pain due to GERD 02/17/2021     She  has a past surgical history that includes Tubal ligation (1997); Knee arthroscopy w/ meniscal repair (Left, 2013); Ankle surgery (Right, 2018); Colonoscopy (2018); Upper gastrointestinal endoscopy; and GASTRECTOMY SLEEVE LAPAROSCOPIC (july 8th 2021).  Her family history includes Dementia in her mother; Diabetes in her maternal aunt; Glaucoma in her mother; Hearing loss in her mother; Heart disease in her maternal aunt; Hyperlipidemia in her mother; Hypertension in her mother; Macular degeneration in her mother; No Known Problems in her daughter, daughter, father, maternal grandfather, maternal grandmother, paternal grandfather, paternal grandmother, and sister; Osteoporosis in her mother; Seizures in her mother.  She  reports that she quit smoking about 8 years ago. Her smoking use included cigarettes. She started smoking about 35 years ago. She has a 6.8 pack-year smoking history. She has never used smokeless tobacco. She reports that she does not currently use alcohol. She reports that she does not use drugs.  Current Outpatient Medications   Medication Sig Dispense Refill    Cholecalciferol (D3 High Potency) 50 MCG (2000 UT) CAPS       estradiol (VAGIFEM, YUVAFEM) 10 MCG TABS vaginal tablet Insert 1 tablet (10 mcg total) into the vagina 2 (two) times a week 8 tablet 11    famotidine (PEPCID) 20 mg tablet Take 1 tablet (20 mg total) by mouth 2 (two) times a day 180 tablet 3    folic acid (FOLVITE) 400 mcg tablet Take 400 mcg by mouth daily      lubiprostone (AMITIZA) 24 mcg capsule Take 1 capsule (24 mcg total) by mouth 2 (two) times a day with meals (Patient taking differently: Take 24 mcg by mouth if needed) 90 capsule 0    Multiple Vitamins-Minerals (MULTIVITAMIN ADULTS 50+ PO) Take by mouth in the morning      NON FORMULARY Take by mouth in the morning Magnesium -calcium vitamin d3      ondansetron (ZOFRAN) 4 mg tablet Take 1 tablet (4 mg total) by mouth  every 8 (eight) hours as needed for nausea or vomiting (Patient taking differently: Take 4 mg by mouth if needed for nausea or vomiting) 20 tablet 0    Semaglutide-Weight Management (WEGOVY) 0.25 MG/0.5ML Inject 0.5 mL (0.25 mg total) under the skin once a week 2 mL 0    estradiol (Estrace) 1 mg tablet Take 1 tablet (1 mg total) by mouth daily (Patient not taking: Reported on 7/19/2024) 30 tablet 11    Progesterone 200 MG CAPS Take 200 mg by mouth at bedtime (Patient not taking: Reported on 7/19/2024) 30 capsule 11     No current facility-administered medications for this visit.     Current Outpatient Medications on File Prior to Visit   Medication Sig    Cholecalciferol (D3 High Potency) 50 MCG (2000 UT) CAPS     estradiol (VAGIFEM, YUVAFEM) 10 MCG TABS vaginal tablet Insert 1 tablet (10 mcg total) into the vagina 2 (two) times a week    famotidine (PEPCID) 20 mg tablet Take 1 tablet (20 mg total) by mouth 2 (two) times a day    folic acid (FOLVITE) 400 mcg tablet Take 400 mcg by mouth daily    lubiprostone (AMITIZA) 24 mcg capsule Take 1 capsule (24 mcg total) by mouth 2 (two) times a day with meals (Patient taking differently: Take 24 mcg by mouth if needed)    Multiple Vitamins-Minerals (MULTIVITAMIN ADULTS 50+ PO) Take by mouth in the morning    NON FORMULARY Take by mouth in the morning Magnesium -calcium vitamin d3    ondansetron (ZOFRAN) 4 mg tablet Take 1 tablet (4 mg total) by mouth every 8 (eight) hours as needed for nausea or vomiting (Patient taking differently: Take 4 mg by mouth if needed for nausea or vomiting)    estradiol (Estrace) 1 mg tablet Take 1 tablet (1 mg total) by mouth daily (Patient not taking: Reported on 7/19/2024)    Progesterone 200 MG CAPS Take 200 mg by mouth at bedtime (Patient not taking: Reported on 7/19/2024)     No current facility-administered medications on file prior to visit.     She is allergic to penicillin g..    Review of Systems   Constitutional:  Negative for fever.  "  Respiratory:  Negative for shortness of breath.    Cardiovascular:  Negative for chest pain and palpitations.   Gastrointestinal:  Negative for constipation, diarrhea and vomiting.   Genitourinary:  Negative for difficulty urinating.   Skin:  Negative for rash.   Neurological:  Negative for headaches.       Objective:    /60   Pulse (!) 52   Temp 97.6 °F (36.4 °C)   Resp (!) 7   Ht 5' 2\" (1.575 m)   Wt 62.5 kg (137 lb 12.8 oz)   BMI 25.20 kg/m²      Physical Exam  Vitals and nursing note reviewed.   Constitutional:       General: She is not in acute distress.     Appearance: She is well-developed. She is obese.   HENT:      Head: Normocephalic and atraumatic.   Eyes:      Conjunctiva/sclera: Conjunctivae normal.   Neck:      Thyroid: No thyromegaly.   Pulmonary:      Effort: Pulmonary effort is normal. No respiratory distress.   Skin:     Findings: No rash (visible).   Neurological:      Mental Status: She is alert and oriented to person, place, and time.   Psychiatric:         Behavior: Behavior normal.         "

## 2024-07-23 ENCOUNTER — OFFICE VISIT (OUTPATIENT)
Age: 54
End: 2024-07-23
Payer: COMMERCIAL

## 2024-07-23 ENCOUNTER — APPOINTMENT (OUTPATIENT)
Dept: LAB | Facility: MEDICAL CENTER | Age: 54
End: 2024-07-23
Payer: COMMERCIAL

## 2024-07-23 ENCOUNTER — PATIENT MESSAGE (OUTPATIENT)
Age: 54
End: 2024-07-23

## 2024-07-23 VITALS
HEIGHT: 63 IN | WEIGHT: 136.2 LBS | TEMPERATURE: 97 F | DIASTOLIC BLOOD PRESSURE: 64 MMHG | HEART RATE: 54 BPM | BODY MASS INDEX: 24.13 KG/M2 | SYSTOLIC BLOOD PRESSURE: 100 MMHG | OXYGEN SATURATION: 98 %

## 2024-07-23 DIAGNOSIS — E66.3 OVERWEIGHT: ICD-10-CM

## 2024-07-23 DIAGNOSIS — K21.9 GERD (GASTROESOPHAGEAL REFLUX DISEASE): ICD-10-CM

## 2024-07-23 DIAGNOSIS — N95.1 MENOPAUSAL SYMPTOMS: ICD-10-CM

## 2024-07-23 DIAGNOSIS — Z11.59 ENCOUNTER FOR HEPATITIS C SCREENING TEST FOR LOW RISK PATIENT: ICD-10-CM

## 2024-07-23 DIAGNOSIS — E53.8 B12 DEFICIENCY: Primary | ICD-10-CM

## 2024-07-23 DIAGNOSIS — Z90.3 HISTORY OF SLEEVE GASTRECTOMY: ICD-10-CM

## 2024-07-23 DIAGNOSIS — E53.8 B12 DEFICIENCY: ICD-10-CM

## 2024-07-23 DIAGNOSIS — Z98.84 STATUS POST BARIATRIC SURGERY: ICD-10-CM

## 2024-07-23 DIAGNOSIS — Z00.00 ANNUAL PHYSICAL EXAM: ICD-10-CM

## 2024-07-23 DIAGNOSIS — Z00.8 HEALTH EXAMINATION IN POPULATION SURVEY: ICD-10-CM

## 2024-07-23 DIAGNOSIS — D50.0 IRON DEFICIENCY ANEMIA SECONDARY TO BLOOD LOSS (CHRONIC): ICD-10-CM

## 2024-07-23 DIAGNOSIS — Z86.39 HISTORY OF OBESITY: ICD-10-CM

## 2024-07-23 DIAGNOSIS — Z98.84 BARIATRIC SURGERY STATUS: ICD-10-CM

## 2024-07-23 DIAGNOSIS — K59.04 CHRONIC IDIOPATHIC CONSTIPATION: ICD-10-CM

## 2024-07-23 DIAGNOSIS — K91.2 POSTSURGICAL MALABSORPTION: ICD-10-CM

## 2024-07-23 DIAGNOSIS — Z48.815 ENCOUNTER FOR SURGICAL AFTERCARE FOLLOWING SURGERY OF DIGESTIVE SYSTEM: ICD-10-CM

## 2024-07-23 DIAGNOSIS — R11.0 NAUSEA: ICD-10-CM

## 2024-07-23 PROBLEM — R07.9 CHEST PAIN DUE TO GERD: Status: RESOLVED | Noted: 2021-02-17 | Resolved: 2024-07-23

## 2024-07-23 PROBLEM — R94.39 ABNORMAL STRESS ECG: Status: RESOLVED | Noted: 2021-04-02 | Resolved: 2024-07-23

## 2024-07-23 LAB
25(OH)D3 SERPL-MCNC: 26.2 NG/ML (ref 30–100)
ALBUMIN SERPL BCG-MCNC: 4.2 G/DL (ref 3.5–5)
ALP SERPL-CCNC: 74 U/L (ref 34–104)
ALT SERPL W P-5'-P-CCNC: 17 U/L (ref 7–52)
ANION GAP SERPL CALCULATED.3IONS-SCNC: 13 MMOL/L (ref 4–13)
AST SERPL W P-5'-P-CCNC: 16 U/L (ref 13–39)
BASOPHILS # BLD AUTO: 0.02 THOUSANDS/ÂΜL (ref 0–0.1)
BASOPHILS NFR BLD AUTO: 0 % (ref 0–1)
BILIRUB SERPL-MCNC: 0.63 MG/DL (ref 0.2–1)
BUN SERPL-MCNC: 18 MG/DL (ref 5–25)
CALCIUM SERPL-MCNC: 10 MG/DL (ref 8.4–10.2)
CHLORIDE SERPL-SCNC: 105 MMOL/L (ref 96–108)
CHOLEST SERPL-MCNC: 228 MG/DL
CO2 SERPL-SCNC: 23 MMOL/L (ref 21–32)
CREAT SERPL-MCNC: 1.03 MG/DL (ref 0.6–1.3)
EOSINOPHIL # BLD AUTO: 0.07 THOUSAND/ÂΜL (ref 0–0.61)
EOSINOPHIL NFR BLD AUTO: 1 % (ref 0–6)
ERYTHROCYTE [DISTWIDTH] IN BLOOD BY AUTOMATED COUNT: 13.7 % (ref 11.6–15.1)
EST. AVERAGE GLUCOSE BLD GHB EST-MCNC: 105 MG/DL
FERRITIN SERPL-MCNC: 238 NG/ML (ref 11–307)
FOLATE SERPL-MCNC: 6.9 NG/ML
GFR SERPL CREATININE-BSD FRML MDRD: 61 ML/MIN/1.73SQ M
GLUCOSE P FAST SERPL-MCNC: 73 MG/DL (ref 65–99)
HBA1C MFR BLD: 5.3 %
HCT VFR BLD AUTO: 49.5 % (ref 34.8–46.1)
HCV AB SER QL: NORMAL
HDLC SERPL-MCNC: 65 MG/DL
HGB BLD-MCNC: 15.9 G/DL (ref 11.5–15.4)
IMM GRANULOCYTES # BLD AUTO: 0.02 THOUSAND/UL (ref 0–0.2)
IMM GRANULOCYTES NFR BLD AUTO: 0 % (ref 0–2)
INSULIN SERPL-ACNC: 6.28 UIU/ML (ref 1.9–23)
IRON SATN MFR SERPL: 36 % (ref 15–50)
IRON SERPL-MCNC: 127 UG/DL (ref 50–212)
LDLC SERPL CALC-MCNC: 141 MG/DL (ref 0–100)
LYMPHOCYTES # BLD AUTO: 1.91 THOUSANDS/ÂΜL (ref 0.6–4.47)
LYMPHOCYTES NFR BLD AUTO: 37 % (ref 14–44)
MCH RBC QN AUTO: 31.9 PG (ref 26.8–34.3)
MCHC RBC AUTO-ENTMCNC: 32.1 G/DL (ref 31.4–37.4)
MCV RBC AUTO: 99 FL (ref 82–98)
MONOCYTES # BLD AUTO: 0.36 THOUSAND/ÂΜL (ref 0.17–1.22)
MONOCYTES NFR BLD AUTO: 7 % (ref 4–12)
NEUTROPHILS # BLD AUTO: 2.86 THOUSANDS/ÂΜL (ref 1.85–7.62)
NEUTS SEG NFR BLD AUTO: 55 % (ref 43–75)
NONHDLC SERPL-MCNC: 163 MG/DL
NRBC BLD AUTO-RTO: 0 /100 WBCS
PLATELET # BLD AUTO: 183 THOUSANDS/UL (ref 149–390)
PMV BLD AUTO: 12.8 FL (ref 8.9–12.7)
POTASSIUM SERPL-SCNC: 4.2 MMOL/L (ref 3.5–5.3)
PROT SERPL-MCNC: 7.5 G/DL (ref 6.4–8.4)
PTH-INTACT SERPL-MCNC: 34.3 PG/ML (ref 12–88)
RBC # BLD AUTO: 4.98 MILLION/UL (ref 3.81–5.12)
RETICS # AUTO: NORMAL 10*3/UL (ref 14097–95744)
RETICS # CALC: 1.48 % (ref 0.37–1.87)
SODIUM SERPL-SCNC: 141 MMOL/L (ref 135–147)
TIBC SERPL-MCNC: 349 UG/DL (ref 250–450)
TRIGL SERPL-MCNC: 111 MG/DL
UIBC SERPL-MCNC: 222 UG/DL (ref 155–355)
VIT B12 SERPL-MCNC: 287 PG/ML (ref 180–914)
WBC # BLD AUTO: 5.24 THOUSAND/UL (ref 4.31–10.16)

## 2024-07-23 PROCEDURE — 83970 ASSAY OF PARATHORMONE: CPT

## 2024-07-23 PROCEDURE — 83550 IRON BINDING TEST: CPT

## 2024-07-23 PROCEDURE — 86803 HEPATITIS C AB TEST: CPT

## 2024-07-23 PROCEDURE — 80061 LIPID PANEL: CPT

## 2024-07-23 PROCEDURE — 83525 ASSAY OF INSULIN: CPT

## 2024-07-23 PROCEDURE — 82306 VITAMIN D 25 HYDROXY: CPT

## 2024-07-23 PROCEDURE — 83036 HEMOGLOBIN GLYCOSYLATED A1C: CPT

## 2024-07-23 PROCEDURE — 82728 ASSAY OF FERRITIN: CPT

## 2024-07-23 PROCEDURE — 80053 COMPREHEN METABOLIC PANEL: CPT

## 2024-07-23 PROCEDURE — 36415 COLL VENOUS BLD VENIPUNCTURE: CPT

## 2024-07-23 PROCEDURE — 85025 COMPLETE CBC W/AUTO DIFF WBC: CPT

## 2024-07-23 PROCEDURE — 82746 ASSAY OF FOLIC ACID SERUM: CPT

## 2024-07-23 PROCEDURE — 84630 ASSAY OF ZINC: CPT

## 2024-07-23 PROCEDURE — 84590 ASSAY OF VITAMIN A: CPT

## 2024-07-23 PROCEDURE — 85045 AUTOMATED RETICULOCYTE COUNT: CPT

## 2024-07-23 PROCEDURE — 82607 VITAMIN B-12: CPT

## 2024-07-23 PROCEDURE — 83540 ASSAY OF IRON: CPT

## 2024-07-23 PROCEDURE — 99396 PREV VISIT EST AGE 40-64: CPT | Performed by: INTERNAL MEDICINE

## 2024-07-23 PROCEDURE — 84425 ASSAY OF VITAMIN B-1: CPT

## 2024-07-23 RX ORDER — LUBIPROSTONE 24 UG/1
24 CAPSULE ORAL
Start: 2024-07-23

## 2024-07-23 RX ORDER — ONDANSETRON 4 MG/1
4 TABLET, FILM COATED ORAL AS NEEDED
Start: 2024-07-23

## 2024-07-23 NOTE — PROGRESS NOTES
Adult Annual Physical  Name: Bhavna Timmons      : 1970      MRN: [id number]  Encounter Provider: Randee Razo MD  Encounter Date: 2024   Encounter department: [facility] [place]    Assessment & Plan   1. B12 deficiency  Assessment & Plan:  Continue monthly B12 vitamin injections.  Labs done today was at low normal.         2. Chronic idiopathic constipation  Assessment & Plan:  Continued follow-up with GI..  Continue lubiprostone daily  Orders:  -     lubiprostone (AMITIZA) 24 mcg capsule; Take 1 capsule (24 mcg total) by mouth daily with breakfast  3. Nausea  -     ondansetron (ZOFRAN) 4 mg tablet; Take 1 tablet (4 mg total) by mouth if needed for nausea or vomiting  4. Status post bariatric surgery  Assessment & Plan:  Continued follow-up with bariatrics.  Vitamin supplementation as prescribed    Immunizations and preventive care screenings were discussed with patient today. Appropriate education was printed on patient's after visit summary.    Counseling:  Alcohol/drug use: discussed moderation in alcohol intake, the recommendations for healthy alcohol use, and avoidance of illicit drug use.  Dental Health: discussed importance of regular tooth brushing, flossing, and dental visits.  Injury prevention: discussed safety/seat belts, safety helmets, smoke detectors, carbon dioxide detectors, and smoking near bedding or upholstery.  Mammogram.cc scheduled for     Presently on the 0.25 mg subcutaneous weekly injection off Wegovy for maintenance of blood sugar, body weight and maintenance.  Seem to aggravate her gastroesophageal reflux  Does use Amitiza for her constipation    Chief Complaint   Patient presents with   • Physical Exam     Yearly physical-  labs done today for other providers          Depression Screening and Follow-up Plan: Patient was screened for depression during today's encounter. They screened negative with a PHQ-2 score of  0.      Chief Complaint   Patient presents with   • Physical Exam     Yearly physical-  labs done today for other providers        History of Present Illness     Adult Annual Physical:  Patient presents for annual physical.     Depression Screening:  - PHQ-2 Score: 0    Review of Systems   Gastrointestinal:  Positive for constipation.        GERD   Musculoskeletal:  Positive for arthralgias.   All other systems reviewed and are negative.    Past Medical History   Past Medical History:   Diagnosis Date   • Abnormal stress ECG 04/02/2021   • Anemia    • Chest pain due to GERD 02/17/2021   • HLD (hyperlipidemia)    • Other hyperlipidemia 01/12/2022   • Palpitations 02/17/2021   • Sarcoidosis      Past Surgical History:   Procedure Laterality Date   • ANKLE SURGERY Right 2018   • COLONOSCOPY  2018   • GASTRECTOMY SLEEVE LAPAROSCOPIC  july 8th 2021   • KNEE ARTHROSCOPY W/ MENISCAL REPAIR Left 2013   • KNEE SURGERY  8/6/2013   • TUBAL LIGATION  1997   • UPPER GASTROINTESTINAL ENDOSCOPY       Family History   Problem Relation Age of Onset   • Dementia Mother    • Seizures Mother    • Hypertension Mother    • Osteoporosis Mother    • Glaucoma Mother    • Macular degeneration Mother    • Hearing loss Mother    • Hyperlipidemia Mother    • Heart disease Mother    • Vision loss Mother    • No Known Problems Father    • No Known Problems Sister    • No Known Problems Daughter    • No Known Problems Daughter    • Heart disease Maternal Aunt    • Diabetes Maternal Aunt    • No Known Problems Maternal Grandmother    • No Known Problems Maternal Grandfather    • No Known Problems Paternal Grandmother    • No Known Problems Paternal Grandfather      Current Outpatient Medications on File Prior to Visit   Medication Sig Dispense Refill   • Cholecalciferol (D3 High Potency) 50 MCG (2000 UT) CAPS      • estradiol (VAGIFEM, YUVAFEM) 10 MCG TABS vaginal tablet Insert 1 tablet (10 mcg total) into the vagina 2 (two) times a week 8 tablet  11   • famotidine (PEPCID) 20 mg tablet Take 1 tablet (20 mg total) by mouth 2 (two) times a day (Patient taking differently: Take 20 mg by mouth 2 (two) times a day as needed) 180 tablet 3   • folic acid (FOLVITE) 400 mcg tablet Take 400 mcg by mouth daily     • Multiple Vitamins-Minerals (MULTIVITAMIN ADULTS 50+ PO) Take by mouth in the morning     • NON FORMULARY Take by mouth in the morning Magnesium -calcium vitamin d3     • Semaglutide-Weight Management (WEGOVY) 0.25 MG/0.5ML Inject 0.5 mL (0.25 mg total) under the skin once a week 2 mL 0   • [DISCONTINUED] estradiol (Estrace) 1 mg tablet Take 1 tablet (1 mg total) by mouth daily (Patient not taking: Reported on 7/19/2024) 30 tablet 11   • [DISCONTINUED] lubiprostone (AMITIZA) 24 mcg capsule Take 1 capsule (24 mcg total) by mouth 2 (two) times a day with meals (Patient taking differently: Take 24 mcg by mouth if needed) 90 capsule 0   • [DISCONTINUED] ondansetron (ZOFRAN) 4 mg tablet Take 1 tablet (4 mg total) by mouth every 8 (eight) hours as needed for nausea or vomiting (Patient taking differently: Take 4 mg by mouth if needed for nausea or vomiting) 20 tablet 0   • [DISCONTINUED] Progesterone 200 MG CAPS Take 200 mg by mouth at bedtime (Patient not taking: Reported on 7/19/2024) 30 capsule 11     No current facility-administered medications on file prior to visit.     Allergies   Allergen Reactions   • Penicillin G Hives      Current Outpatient Medications on File Prior to Visit   Medication Sig Dispense Refill   • Cholecalciferol (D3 High Potency) 50 MCG (2000 UT) CAPS      • estradiol (VAGIFEM, YUVAFEM) 10 MCG TABS vaginal tablet Insert 1 tablet (10 mcg total) into the vagina 2 (two) times a week 8 tablet 11   • famotidine (PEPCID) 20 mg tablet Take 1 tablet (20 mg total) by mouth 2 (two) times a day (Patient taking differently: Take 20 mg by mouth 2 (two) times a day as needed) 180 tablet 3   • folic acid (FOLVITE) 400 mcg tablet Take 400 mcg by mouth  "daily     • Multiple Vitamins-Minerals (MULTIVITAMIN ADULTS 50+ PO) Take by mouth in the morning     • NON FORMULARY Take by mouth in the morning Magnesium -calcium vitamin d3     • Semaglutide-Weight Management (WEGOVY) 0.25 MG/0.5ML Inject 0.5 mL (0.25 mg total) under the skin once a week 2 mL 0   • [DISCONTINUED] estradiol (Estrace) 1 mg tablet Take 1 tablet (1 mg total) by mouth daily (Patient not taking: Reported on 2024) 30 tablet 11   • [DISCONTINUED] lubiprostone (AMITIZA) 24 mcg capsule Take 1 capsule (24 mcg total) by mouth 2 (two) times a day with meals (Patient taking differently: Take 24 mcg by mouth if needed) 90 capsule 0   • [DISCONTINUED] ondansetron (ZOFRAN) 4 mg tablet Take 1 tablet (4 mg total) by mouth every 8 (eight) hours as needed for nausea or vomiting (Patient taking differently: Take 4 mg by mouth if needed for nausea or vomiting) 20 tablet 0   • [DISCONTINUED] Progesterone 200 MG CAPS Take 200 mg by mouth at bedtime (Patient not taking: Reported on 2024) 30 capsule 11     No current facility-administered medications on file prior to visit.      Social History     Tobacco Use   • Smoking status: Former     Current packs/day: 0.00     Average packs/day: 0.3 packs/day for 33.4 years (10.0 ttl pk-yrs)     Types: Cigarettes     Start date:      Quit date: 2016     Years since quittin.5   • Smokeless tobacco: Never   • Tobacco comments:     I stop smoking for about 15 years   Vaping Use   • Vaping status: Never Used   Substance and Sexual Activity   • Alcohol use: Not Currently     Comment: socially   • Drug use: Never   • Sexual activity: Yes     Partners: Male     Birth control/protection: Other     Comment: Tubaligation       Objective     /64 (BP Location: Left arm, Patient Position: Sitting, Cuff Size: Standard)   Pulse (!) 54   Temp (!) 97 °F (36.1 °C) (Temporal)   Ht 5' 2.8\" (1.595 m)   Wt 61.8 kg (136 lb 3.2 oz)   SpO2 98%   BMI 24.28 kg/m²     Physical " Exam    Gen: NAD  Heent: atraumatic, normocephalic  Mouth: is moist  Neck: is supple, no JVD, no carotid bruits.   Heart: is regular Normal s1, s2,  Lungs: are clear to auscultation  Abd: soft, non tender, NABS  Ext: no edema, distal pulses normal  Skin: no rashes, ulcers  Mood: normal affect  Neuro: AAOx3 I discussed with her that she is a candidate for lung cancer CT screening.     The following Shared Decision-Making points were covered:  Benefits of screening were discussed, including the rates of reduction in death from lung cancer and other causes.  Harms of screening were reviewed, including false positive tests, radiation exposure levels, risks of invasive procedures, risks of complications of screening, and risk of overdiagnosis.  I counseled on the importance of adherence to annual lung cancer LDCT screening, impact of co-morbidities, and ability or willingness to undergo diagnosis and treatment.  I counseled on the importance of maintaining abstinence as a former smoker or was counseled on the importance of smoking cessation if a current smoker    Review of Eligibility Criteria: She does not meet all of the criteria for Lung Cancer Screening.   She is 54 y.o.   She has 10 pack year tobacco history and is a current smoker or has quit within the past 15 years  She presents no signs or symptoms of lung cancer    Patient does not qualify for lung cancer screening.

## 2024-07-25 LAB — ZINC SERPL-MCNC: 73 UG/DL (ref 44–115)

## 2024-07-25 RX ORDER — PROGESTERONE 200 MG/1
200 CAPSULE ORAL
Start: 2024-07-25

## 2024-07-26 DIAGNOSIS — N95.1 MENOPAUSAL SYMPTOMS: Primary | ICD-10-CM

## 2024-07-26 LAB
VIT A SERPL-MCNC: 64.8 UG/DL (ref 20.1–62)
VIT B1 BLD-SCNC: 159.1 NMOL/L (ref 66.5–200)

## 2024-07-26 RX ORDER — ESTRADIOL 1 MG/1
1 TABLET ORAL DAILY
Qty: 90 TABLET | Refills: 3 | Status: SHIPPED | OUTPATIENT
Start: 2024-07-26

## 2024-07-29 DIAGNOSIS — Z98.84 BARIATRIC SURGERY STATUS: Primary | ICD-10-CM

## 2024-07-29 DIAGNOSIS — E55.9 VITAMIN D DEFICIENCY: ICD-10-CM

## 2024-07-29 DIAGNOSIS — E67.0 HIGH VITAMIN A LEVEL: ICD-10-CM

## 2024-07-30 ENCOUNTER — TELEPHONE (OUTPATIENT)
Dept: BARIATRICS | Facility: CLINIC | Age: 54
End: 2024-07-30

## 2024-07-30 DIAGNOSIS — E66.3 OVERWEIGHT: Primary | ICD-10-CM

## 2024-07-30 DIAGNOSIS — Z86.39 HISTORY OF OBESITY: ICD-10-CM

## 2024-07-30 NOTE — TELEPHONE ENCOUNTER
Patient requesting a dose increase of Wegovy to be sent to [facility]. Patient tolerating previous dose without any incident.

## 2024-08-06 ENCOUNTER — OFFICE VISIT (OUTPATIENT)
Dept: HEMATOLOGY ONCOLOGY | Facility: CLINIC | Age: 54
End: 2024-08-06
Payer: COMMERCIAL

## 2024-08-06 VITALS
DIASTOLIC BLOOD PRESSURE: 60 MMHG | WEIGHT: 136 LBS | RESPIRATION RATE: 16 BRPM | TEMPERATURE: 97.4 F | SYSTOLIC BLOOD PRESSURE: 120 MMHG | BODY MASS INDEX: 24.1 KG/M2 | HEART RATE: 59 BPM | HEIGHT: 63 IN | OXYGEN SATURATION: 98 %

## 2024-08-06 DIAGNOSIS — Z86.2 HISTORY OF SARCOIDOSIS: ICD-10-CM

## 2024-08-06 DIAGNOSIS — D50.0 IRON DEFICIENCY ANEMIA SECONDARY TO BLOOD LOSS (CHRONIC): ICD-10-CM

## 2024-08-06 DIAGNOSIS — E53.8 B12 DEFICIENCY: ICD-10-CM

## 2024-08-06 DIAGNOSIS — D50.8 OTHER IRON DEFICIENCY ANEMIA: ICD-10-CM

## 2024-08-06 DIAGNOSIS — Z98.84 STATUS POST BARIATRIC SURGERY: ICD-10-CM

## 2024-08-06 DIAGNOSIS — K90.9 MALABSORPTION SYNDROME: Primary | ICD-10-CM

## 2024-08-06 PROCEDURE — 99213 OFFICE O/P EST LOW 20 MIN: CPT | Performed by: INTERNAL MEDICINE

## 2024-08-06 RX ORDER — CYANOCOBALAMIN 1000 UG/ML
1000 INJECTION, SOLUTION INTRAMUSCULAR; SUBCUTANEOUS ONCE
OUTPATIENT
Start: 2024-08-12

## 2024-08-06 RX ORDER — ESTRADIOL 10 UG/1
1 INSERT VAGINAL 2 TIMES WEEKLY
COMMUNITY

## 2024-08-06 NOTE — PROGRESS NOTES
"  HPI: Continuation of care for gastric malabsorption secondary to gastric sleeve surgery that she had in [place] in 2021.  She had lost 50 lb. She has gained some weight back.  She developed a deficiency of iron, B12 and vitamin D.  Patient had intravenous iron and without anemia and ferritin improved.  She had B12 shots and has low normal B12 level and will be started on B12 shots.  She has been taking bariatrics vitamins from bariatric physician and doctor is monitoring and managing deficiency of vitamins.  Vitamin D level is low but better than before.  Oral iron gives her constipation.  GERD symptoms have improved.  She has tiredness.  Patient also has history of sarcoidosis and she states that is in remission.    She states she had EGD and colonoscopy previously and had Cologuard within 3 years.    She follows with her gastroenterologist.        ROS:  08/10/24 Reviewed 12 systems: See symptoms in HPI  Presently no other neurological, cardiac, pulmonary, GI and  symptoms other than listed in HPI.  Other symptoms are in HPI.  No  fever, chills, active bleeding, bone pains, skin rash,  night sweats, arthritic symptoms,   numbness, claudication and gait problem. No frequent infections.  Not unusually sensitive to heat or cold. No swelling of the ankles. No swollen glands.  Patient is anxious.     Physical Exam:     8/6/24 7/23/24 7/19/24   Blood Pressure 120/60 100/64 127/60   Pulse 59 54 (Abnormal)   52 (Abnormal)     Respirations 16 -- 7 (Abnormal)     Temperature 97.4 °F (36.3 °C) (Abnormal)   97 °F (36.1 °C) (Abnormal)   97.6 °F (36.4 °C)   Temp Source Temporal Temporal --   SpO2 98 % 98 % --   Weight - Scale 61.7 kg (136 lb) 61.8 kg (136 lb 3.2 oz) 62.5 kg (137 lb 12.8 oz)   Height 5' 2.8\" (1.595 m) 5' 2.8\" (1.595 m) 5' 2\" (1.575 m)   Pain Score Zero -- --   Pain Education N -- --   Alert and oriented and not in distress.  Vital signs are above.  No icterus.  No oral thrush.  No palpable neck mass.  Clear " lung fields.  Regular heart rate.  Soft and nontender abdomen.  No palpable abdominal mass.  No ascites.  No edema of ankles.  No calf tenderness.  There is no focal neurological deficit, no skin rash, no palpable lymphadenopathy in the neck and axillary areas,  no clubbing.   Patient is anxious.  Performance status 1.  Historical Information   Past Medical History:   Diagnosis Date   • Abnormal stress ECG 2021   • Anemia    • Chest pain due to GERD 2021   • HLD (hyperlipidemia)    • Other hyperlipidemia 2022   • Palpitations 2021   • Sarcoidosis      Past Surgical History:   Procedure Laterality Date   • ANKLE SURGERY Right 2018   • COLONOSCOPY  2018   • GASTRECTOMY SLEEVE LAPAROSCOPIC  2021   • KNEE ARTHROSCOPY W/ MENISCAL REPAIR Left    • KNEE SURGERY  2013   • TUBAL LIGATION     • UPPER GASTROINTESTINAL ENDOSCOPY       Social History   Social History     Substance and Sexual Activity   Alcohol Use Not Currently    Comment: socially     Social History     Substance and Sexual Activity   Drug Use Never     Social History     Tobacco Use   Smoking Status Former   • Current packs/day: 0.00   • Average packs/day: 0.3 packs/day for 33.4 years (10.0 ttl pk-yrs)   • Types: Cigarettes   • Start date:    • Quit date:    • Years since quittin.6   • Passive exposure: Past   Smokeless Tobacco Never   Tobacco Comments    I stop smoking for about 15 years     Family History:   Family History   Problem Relation Age of Onset   • Dementia Mother    • Seizures Mother    • Hypertension Mother    • Osteoporosis Mother    • Glaucoma Mother    • Macular degeneration Mother    • Hearing loss Mother    • Hyperlipidemia Mother    • Heart disease Mother    • Vision loss Mother    • No Known Problems Father    • No Known Problems Sister    • No Known Problems Daughter    • No Known Problems Daughter    • Heart disease Maternal Aunt    • Diabetes Maternal Aunt    • No Known Problems  Maternal Grandmother    • No Known Problems Maternal Grandfather    • No Known Problems Paternal Grandmother    • No Known Problems Paternal Grandfather          Current Outpatient Medications:   •  estradiol (Estrace) 1 mg tablet, Take 1 tablet (1 mg total) by mouth daily, Disp: 90 tablet, Rfl: 3  •  estradiol (VAGIFEM, YUVAFEM) 10 MCG TABS vaginal tablet, Insert 1 tablet into the vagina 2 (two) times a week, Disp: , Rfl:   •  famotidine (PEPCID) 20 mg tablet, Take 1 tablet (20 mg total) by mouth 2 (two) times a day (Patient taking differently: Take 20 mg by mouth continuous as needed), Disp: 180 tablet, Rfl: 3  •  lubiprostone (AMITIZA) 24 mcg capsule, Take 1 capsule (24 mcg total) by mouth daily with breakfast (Patient taking differently: Take 24 mcg by mouth continuous as needed), Disp: , Rfl:   •  ondansetron (ZOFRAN) 4 mg tablet, Take 1 tablet (4 mg total) by mouth if needed for nausea or vomiting, Disp: , Rfl:   •  Progesterone 200 MG CAPS, Take 200 mg by mouth at bedtime, Disp: , Rfl:   •  Semaglutide-Weight Management (WEGOVY) 0.25 MG/0.5ML, Inject 0.5 mL (0.25 mg total) under the skin once a week, Disp: 2 mL, Rfl: 0  •  Cholecalciferol (D3 High Potency) 50 MCG (2000 UT) CAPS, , Disp: , Rfl:   •  folic acid (FOLVITE) 400 mcg tablet, Take 400 mcg by mouth daily (Patient not taking: Reported on 8/6/2024), Disp: , Rfl:   •  Multiple Vitamins-Minerals (MULTIVITAMIN ADULTS 50+ PO), Take by mouth in the morning (Patient not taking: Reported on 8/6/2024), Disp: , Rfl:   •  NON FORMULARY, Take by mouth in the morning Magnesium -calcium vitamin d3 (Patient not taking: Reported on 8/6/2024), Disp: , Rfl:   •  Semaglutide-Weight Management (WEGOVY) 0.5 MG/0.5ML, Inject 0.5 mL (0.5 mg total) under the skin once a week (Patient not taking: Reported on 8/6/2024), Disp: 2 mL, Rfl: 0    Allergies   Allergen Reactions   • Penicillin G Hives           Lab Results: I have reviewed all pertinent labs.  LABS:      Results for  orders placed or performed in visit on 07/23/24   Hemoglobin A1C   Result Value Ref Range    Hemoglobin A1C 5.3 Normal 4.0-5.6%; PreDiabetic 5.7-6.4%; Diabetic >=6.5%; Glycemic control for adults with diabetes <7.0% %     mg/dl   Lipid panel   Result Value Ref Range    Cholesterol 228 (H) See Comment mg/dL    Triglycerides 111 See Comment mg/dL    HDL, Direct 65 >=50 mg/dL    LDL Calculated 141 (H) 0 - 100 mg/dL    Non-HDL-Chol (CHOL-HDL) 163 mg/dl   TIBC Panel (incl. Iron, TIBC, % Iron Saturation)  Order: [id number] - Part of Panel Order [id number]   Status: Final result       Visible to patient: Yes (seen)       Next appt: 08/13/2024 at 07:40 AM in Radiology ([facility] 1)       Dx: Iron deficiency anemia secondary to b...    0 Result Notes         Component  Ref Range & Units 7/23/24  6:46 AM 11/22/23  7:20 AM 7/18/23  4:45 PM 2/23/22  8:09 AM   Iron Saturation  15 - 50 % 36 43 14 Low  6 Low    TIBC  250 - 450 ug/dL 349 312 427 444   Iron  50 - 212 ug/dL 127 133 CM 61 R, CM 25 Low  R, CM   Comment: Patients treated with metal-binding drugs (ie. Deferoxamine) may have depressed iron values.   UIBC  155 - 355 ug/dL 222 179                        Component  Ref Range & Units 7/23/24  6:46 AM 11/22/23  7:20 AM 7/18/23  4:45 PM 11/9/22  2:19 PM 6/14/22 10:49 AM 5/6/22  9:22 AM 2/23/22  8:09 AM   Ferritin  11 - 307 ng/mL 238 286 11 45 R 32 R 69 R 4 Low  R                 Comprehensive metabolic panel  Status: Final result     Comprehensive metabolic panel  Order: [id number]   Status: Final result       Visible to patient: Yes (seen)       Next appt: 08/13/2024 at 07:40 AM in Radiology ([facility])       Dx: Bariatric surgery status; BMI 27.0-27...    0 Result Notes       1 Patient Communication       1 Follow-up Encounter         Component  Ref Range & Units 7/23/24  6:46 AM 2/23/22  8:09 AM 4/9/21  8:30 AM 4/5/21 10:39 AM   Sodium  135 - 147 mmol/L 141 142 R 142 R 139 R   Potassium  3.5 - 5.3 mmol/L 4.2  4.2 4.5 CM 4.5   Chloride  96 - 108 mmol/L 105 106 R 111 High  R 106 R   CO2  21 - 32 mmol/L 23 27 23 25   ANION GAP  4 - 13 mmol/L 13 9 8 8   BUN  5 - 25 mg/dL 18 14 14 14   Creatinine  0.60 - 1.30 mg/dL 1.03 0.86 CM 0.82 CM 0.89 CM   Comment: Standardized to IDMS reference method   Glucose, Fasting  65 - 99 mg/dL 73 86 CM 91 CM 85 CM   Calcium  8.4 - 10.2 mg/dL 10.0 8.8 R 8.6 R 8.8 R   AST  13 - 39 U/L 16 12 R, CM  19 R, CM   ALT  7 - 52 U/L 17 18 R, CM  20 R, CM   Comment: Specimen collection should occur prior to Sulfasalazine administration due to the potential for falsely depressed results.   Alkaline Phosphatase  34 - 104 U/L 74 67 R  103 R   Total Protein  6.4 - 8.4 g/dL 7.5 7.0 R  7.4 R   Albumin  3.5 - 5.0 g/dL 4.2 3.6  3.5   Total Bilirubin  0.20 - 1.00 mg/dL 0.63 0.36 CM  0.20 CM   Comment: Use of this assay is not recommended for patients undergoing treatment with eltrombopag due to the potential for falsely elevated results.  N-acetyl-p-benzoquinone imine (metabolite of Acetaminophen) will generate erroneously low results in samples for patients that have taken an overdose of Acetaminophen.   eGFR  ml/min/1.73sq m 61 77 96 87             Component  Ref Range & Units 7/23/24  6:46 AM 6/14/22 10:49 AM   Folate  >5.9 ng/mL 6.9 17.6 High  R, CM   Comment: The World Health Organization has determined deficient folate concentrations are considered to be <4.0 ng/mL.                          Component  Ref Range & Units 7/23/24  6:46 AM 11/22/23  7:20 AM 7/18/23  4:45 PM 6/14/22 10:49 AM   Vitamin B-12  180 - 914 pg/mL 287 353 178 Low  336 R        0 Result Notes       1 Patient Communication       1 Follow-up Encounter       Component  Ref Range & Units 7/23/24  6:46 AM 2/23/22  8:09 AM   Vit D, 25-Hydroxy  30.0 - 100.0 ng/mL 26.2 Low  21.8 Low    Comment: Vitamin D guidelines established by Clinical Guidelines Subcommittee  of the Endocrine Society Task Force, 2011        CBC and differential  Status: Final  result      Contains abnormal data CBC and differential  Order: [id number]   Status: Final result       Visible to patient: Yes (seen)       Next appt: 08/13/2024 at 07:40 AM in Radiology ([facility])       Dx: Malabsorption syndrome; Status post b...    0 Result Notes            Component  Ref Range & Units 7/23/24  6:46 AM 11/22/23  7:20 AM 7/18/23  4:45 PM 11/9/22  2:19 PM 6/14/22 10:49 AM 5/6/22  9:22 AM 2/23/22  8:09 AM   WBC  4.31 - 10.16 Thousand/uL 5.24 5.13 6.15 5.92 5.59 4.50 4.05 Low    RBC  3.81 - 5.12 Million/uL 4.98 5.24 High  5.01 4.97 5.17 High  5.28 High  4.42   Hemoglobin  11.5 - 15.4 g/dL 15.9 High  16.2 High  15.7 High  15.9 High  15.8 High  15.5 High  10.0 Low    Hematocrit  34.8 - 46.1 % 49.5 High  50.1 High  49.7 High  48.9 High  49.7 High  49.7 High  34.3 Low    MCV  82 - 98 fL 99 High  96 99 High  98 96 94 78 Low    MCH  26.8 - 34.3 pg 31.9 30.9 31.3 32.0 30.6 29.4 22.6 Low    MCHC  31.4 - 37.4 g/dL 32.1 32.3 31.6 32.5 31.8 31.2 Low  29.2 Low    RDW  11.6 - 15.1 % 13.7 12.9 13.9 13.6 17.7 High  24.0 High  18.6 High    MPV  8.9 - 12.7 fL 12.8 High  13.5 High  12.8 High  12.5 11.8  12.0   Platelets  149 - 390 Thousands/uL 183 154 171 196 175 187 294   nRBC  /100 WBCs 0 0 0 0 0 0 0   Segmented %  43 - 75 % 55 62 57 57 61 55 53   Immature Grans %  0 - 2 % 0 0 0 0 0 0 0   Lymphocytes %  14 - 44 % 37 29 33 34 30 36 38   Monocytes %  4 - 12 % 7 8 8 6 8 8 6   Eosinophils Relative  0 - 6 % 1 1 1 2 1 1 2   Basophils Relative  0 - 1 % 0 0 1 1 0 0 1   Absolute Neutrophils  1.85 - 7.62 Thousands/µL 2.86 3.14 3.51 3.39 3.38 2.46 2.13   Absolute Immature Grans  0.00 - 0.20 Thousand/uL 0.02 0.01 0.01 0.02 0.02 0.00 0.01   Absolute Lymphocytes  0.60 - 4.47 Thousands/µL 1.91 1.47 2.02 2.01 1.67 1.62 1.55   Absolute Monocytes  0.17 - 1.22 Thousand/µL 0.36 0.43 0.50 0.36 0.47 0.36 0.25   Eosinophils Absolute  0.00 - 0.61 Thousand/µL 0.07 0.06 0.08 0.11 0.03 0.04 0.09   Basophils Absolute  0.00 - 0.10  Thousands/µL 0.02 0.02 0.03 0.03 0.02 0.02 0.02              Narrative    This is an appended report.  These results have been appended to a previously verified report.      Specimen Collected: 07/23/24  6:46 AM Last Resulted: 07/23/24  1:07 PM                       Imaging Studies: I have personally reviewed pertinent reports.    Pathology, and Other Studies: I have personally reviewed pertinent reports.        Assessment and Plan:  See diagnoses, orders instructions below  Continuation of care for gastric malabsorption secondary to gastric sleeve surgery that she had in [place] in 2021.  She had lost 50 lb. She has gained some weight back.  She developed a deficiency of iron, B12 and vitamin D.  Patient had intravenous iron and without anemia and ferritin improved.  She had B12 shots and has low normal B12 level and will be started on B12 shots.  She has been taking bariatrics vitamins from bariatric physician and doctor is monitoring and managing deficiency of vitamins.  Vitamin D level is low but better than before.  Oral iron gives her constipation.  GERD symptoms have improved.  She has tiredness.  Patient also has history of sarcoidosis and she states that is in remission.    She states she had EGD and colonoscopy previously and had Cologuard within 3 years.    She follows with her gastroenterologist.  .  Physical examination and test results are as recorded and discussed.  She will be restarted on B12 shots.  She gets bariatric vitamins from bariatric physician who also monitors and manages her vitamins deficiencies.  Discussed the importance of self-breast examination, eating healthy foods, small portions more frequently, activities as tolerated and health screening test.  Patient is capable of self-care.  Goal is to correct her iron and B12 and other vitamin deficiencies.  She will continue to follow with her primary physician and other consultants.  She follows with her gastroenterologist.  Discussed  precautions against coronavirus and other infections.  See diagnoses, orders and instructions.      1. Malabsorption syndrome    - CBC and differential; Standing  - Ferritin; Standing    2. Status post bariatric surgery    - CBC and differential; Standing  - Ferritin; Standing    3. Iron deficiency anemia secondary to blood loss (chronic)    - CBC and differential; Standing  - Ferritin; Standing    4. B12 deficiency    - Vitamin B12; Standing    5. History of sarcoidosis                  Blood count and ferritin every 3 months.  B12 blood test every 6 months.  Visit in 1 year.  Patient to restart B12 injections at the infusion center once a month.     Patient will continue to follow with her primary physician and other consultants.  Patient voiced understanding and agrees     This note has been generated by voice recognition softener.  Therefore there maybe spelling, grammar, and other mistakes.  Please contact if questions arise.    Counseling / Coordination of Care  ..  Provided counseling and support

## 2024-08-06 NOTE — PATIENT INSTRUCTIONS
Blood count and ferritin every 3 months.  B12 blood test every 6 months.  Visit in 1 year.  Patient to restart B12 injections at the infusion center once a month.  
Family

## 2024-08-13 ENCOUNTER — HOSPITAL ENCOUNTER (OUTPATIENT)
Dept: MAMMOGRAPHY | Facility: MEDICAL CENTER | Age: 54
Discharge: HOME | End: 2024-08-13
Payer: COMMERCIAL

## 2024-08-13 VITALS — BODY MASS INDEX: 24.1 KG/M2 | HEIGHT: 63 IN | WEIGHT: 136 LBS

## 2024-08-13 DIAGNOSIS — Z12.31 SCREENING MAMMOGRAM FOR HIGH-RISK PATIENT: ICD-10-CM

## 2024-08-13 PROCEDURE — 77063 BREAST TOMOSYNTHESIS BI: CPT

## 2024-08-13 PROCEDURE — 77067 SCR MAMMO BI INCL CAD: CPT

## 2024-08-21 ENCOUNTER — HOSPITAL ENCOUNTER (OUTPATIENT)
Dept: INFUSION CENTER | Facility: CLINIC | Age: 54
Discharge: HOME | End: 2024-08-21
Payer: COMMERCIAL

## 2024-08-21 DIAGNOSIS — D50.8 OTHER IRON DEFICIENCY ANEMIA: Primary | ICD-10-CM

## 2024-08-21 DIAGNOSIS — E53.8 B12 DEFICIENCY: ICD-10-CM

## 2024-08-21 PROCEDURE — 96372 THER/PROPH/DIAG INJ SC/IM: CPT

## 2024-08-21 RX ORDER — CYANOCOBALAMIN 1000 UG/ML
1000 INJECTION, SOLUTION INTRAMUSCULAR; SUBCUTANEOUS ONCE
Status: COMPLETED | OUTPATIENT
Start: 2024-08-21 | End: 2024-08-21

## 2024-08-21 RX ORDER — CYANOCOBALAMIN 1000 UG/ML
1000 INJECTION, SOLUTION INTRAMUSCULAR; SUBCUTANEOUS ONCE
OUTPATIENT
Start: 2024-09-18

## 2024-08-21 RX ADMIN — CYANOCOBALAMIN 1000 MCG: 1000 INJECTION, SOLUTION INTRAMUSCULAR at 12:13

## 2024-08-21 NOTE — PROGRESS NOTES
Pt presents for B12 administered in [facility]. No new meds or concerns. Pt tolerated treatment without adverse reaction. Future appointments discussed, confirmed with patient for 9/18/2024 1200. AVS declined.

## 2024-08-27 ENCOUNTER — ANNUAL EXAM (OUTPATIENT)
Dept: OBGYN CLINIC | Facility: CLINIC | Age: 54
End: 2024-08-27
Payer: COMMERCIAL

## 2024-08-27 VITALS
DIASTOLIC BLOOD PRESSURE: 72 MMHG | SYSTOLIC BLOOD PRESSURE: 110 MMHG | BODY MASS INDEX: 24.48 KG/M2 | WEIGHT: 133 LBS | HEIGHT: 62 IN

## 2024-08-27 DIAGNOSIS — Z01.419 ENCOUNTER FOR GYNECOLOGICAL EXAMINATION WITHOUT ABNORMAL FINDING: Primary | ICD-10-CM

## 2024-08-27 DIAGNOSIS — N95.2 ATROPHIC VAGINITIS: ICD-10-CM

## 2024-08-27 DIAGNOSIS — Z01.419 PAP SMEAR, AS PART OF ROUTINE GYNECOLOGICAL EXAMINATION: ICD-10-CM

## 2024-08-27 DIAGNOSIS — Z12.31 ENCOUNTER FOR SCREENING MAMMOGRAM FOR MALIGNANT NEOPLASM OF BREAST: ICD-10-CM

## 2024-08-27 PROCEDURE — G0145 SCR C/V CYTO,THINLAYER,RESCR: HCPCS | Performed by: OBSTETRICS & GYNECOLOGY

## 2024-08-27 PROCEDURE — S0612 ANNUAL GYNECOLOGICAL EXAMINA: HCPCS | Performed by: OBSTETRICS & GYNECOLOGY

## 2024-08-27 PROCEDURE — G0476 HPV COMBO ASSAY CA SCREEN: HCPCS | Performed by: OBSTETRICS & GYNECOLOGY

## 2024-09-03 LAB
LAB AP GYN PRIMARY INTERPRETATION: NORMAL
Lab: NORMAL

## 2024-09-06 NOTE — PROGRESS NOTES
Assessment/Plan:    No problem-specific Assessment & Plan notes found for this encounter.       Diagnoses and all orders for this visit:    Encounter for gynecological examination without abnormal finding    Pap smear, as part of routine gynecological examination  -     Liquid-based pap, screening  -     HPV High Risk    Encounter for screening mammogram for malignant neoplasm of breast    Atrophic vaginitis          Normal gynecological physical examination.  Self-breast examination stressed.  Mammogram ordered.  Discussed regular exercise, healthy diet, importance of vitamin D and calcium supplements.  Discussed importance of sun block use during periods of prolonged sun exposure.  Patient will be seen in 1 year for routine gynecologic and medical examination.  Patient will call office for any problems, concerns, or issues which may arise during the interim.     Subjective:          HPI    Patient ID: Bhavna Timmons is a 54 y.o. female who presents today for her annual gynecologic and medical examination    Menstrual status: Patient is postmenopausal and denies any vaginal bleeding at this time    Vasomotor symptoms: Patient followed for management of vasomotor symptoms by Dr. Patricia Shoemaker    Patient reports normal appetite    Patient reports normal bowel and bladder habits    Patient denies any significant pelvic or abdominal pain    Patient denies any headaches, chest pain, shortness of breath fever shakes or chills    Patient denies any COVID 19 symptoms including cough or loss of taste or smell    COVID vaccine status: Aware of COVID vaccination protocols    Medical problems: No significant medical problems    Colonoscopy status: Up-to-date with screening colonoscopy    Mammogram status: Does regular self breast exams and is up-to-date with screening mammography as well.  Appropriate arrangements were made at today's visit for her annual screening mammogram.    The following portions of the patient's  "history were reviewed and updated as appropriate: allergies, current medications, past family history, past medical history, past social history, past surgical history and problem list.    Review of Systems   Constitutional: Negative.  Negative for appetite change, diaphoresis, fatigue, fever and unexpected weight change.   HENT: Negative.     Eyes: Negative.    Respiratory: Negative.     Cardiovascular: Negative.    Gastrointestinal: Negative.  Negative for abdominal pain, blood in stool, constipation, diarrhea, nausea and vomiting.   Endocrine: Negative.  Negative for cold intolerance and heat intolerance.   Genitourinary: Negative.  Negative for dysuria, frequency, hematuria, urgency, vaginal bleeding, vaginal discharge and vaginal pain.   Musculoskeletal: Negative.    Skin: Negative.    Allergic/Immunologic: Negative.    Neurological: Negative.    Hematological: Negative.  Negative for adenopathy.   Psychiatric/Behavioral: Negative.           Objective:      /72 (BP Location: Left arm, Patient Position: Sitting, Cuff Size: Standard)   Ht 5' 2\" (1.575 m)   Wt 60.3 kg (133 lb)   LMP 08/05/2024 (Approximate)   BMI 24.33 kg/m²          Physical Exam  Constitutional:       General: She is not in acute distress.     Appearance: Normal appearance. She is well-developed. She is not diaphoretic.   HENT:      Head: Normocephalic and atraumatic.   Eyes:      Pupils: Pupils are equal, round, and reactive to light.   Cardiovascular:      Rate and Rhythm: Normal rate and regular rhythm.      Heart sounds: Normal heart sounds. No murmur heard.     No friction rub. No gallop.   Pulmonary:      Effort: Pulmonary effort is normal.      Breath sounds: Normal breath sounds.   Chest:   Breasts:     Breasts are symmetrical.      Right: No inverted nipple, mass, nipple discharge, skin change or tenderness.      Left: No inverted nipple, mass, nipple discharge, skin change or tenderness.   Abdominal:      General: Bowel " sounds are normal.      Palpations: Abdomen is soft.   Genitourinary:     General: Normal vulva.      Exam position: Supine.      Labia:         Right: No rash or lesion.         Left: No rash or lesion.       Urethra: No urethral swelling or urethral lesion.      Vagina: Normal. No vaginal discharge, erythema, tenderness or bleeding.      Cervix: No discharge or friability.      Uterus: Not enlarged and not tender.       Adnexa:         Right: No mass, tenderness or fullness.          Left: No mass, tenderness or fullness.        Rectum: Normal. Guaiac result negative.      Comments: Pelvic exam revealed mild atrophic vaginitis  Good pelvic support confirmed  Musculoskeletal:         General: Normal range of motion.      Cervical back: Normal range of motion and neck supple.   Lymphadenopathy:      Cervical: No cervical adenopathy.      Upper Body:      Right upper body: No supraclavicular adenopathy.      Left upper body: No supraclavicular adenopathy.   Skin:     General: Skin is warm and dry.      Findings: No rash.   Neurological:      General: No focal deficit present.      Mental Status: She is alert and oriented to person, place, and time.   Psychiatric:         Mood and Affect: Mood normal.         Speech: Speech normal.         Behavior: Behavior normal.         Thought Content: Thought content normal.         Judgment: Judgment normal.

## 2024-09-18 ENCOUNTER — HOSPITAL ENCOUNTER (OUTPATIENT)
Dept: INFUSION CENTER | Facility: CLINIC | Age: 54
Discharge: HOME | End: 2024-09-18
Payer: COMMERCIAL

## 2024-09-18 VITALS
TEMPERATURE: 97.8 F | RESPIRATION RATE: 16 BRPM | DIASTOLIC BLOOD PRESSURE: 73 MMHG | SYSTOLIC BLOOD PRESSURE: 112 MMHG | HEART RATE: 51 BPM

## 2024-09-18 DIAGNOSIS — E53.8 B12 DEFICIENCY: Primary | ICD-10-CM

## 2024-09-18 DIAGNOSIS — D50.8 OTHER IRON DEFICIENCY ANEMIA: ICD-10-CM

## 2024-09-18 PROCEDURE — 96372 THER/PROPH/DIAG INJ SC/IM: CPT

## 2024-09-18 RX ORDER — CYANOCOBALAMIN 1000 UG/ML
1000 INJECTION, SOLUTION INTRAMUSCULAR; SUBCUTANEOUS ONCE
Status: COMPLETED | OUTPATIENT
Start: 2024-09-18 | End: 2024-09-18

## 2024-09-18 RX ORDER — CYANOCOBALAMIN 1000 UG/ML
1000 INJECTION, SOLUTION INTRAMUSCULAR; SUBCUTANEOUS ONCE
OUTPATIENT
Start: 2024-10-16

## 2024-09-18 RX ADMIN — CYANOCOBALAMIN 1000 MCG: 1000 INJECTION, SOLUTION INTRAMUSCULAR at 12:25

## 2024-09-18 NOTE — PLAN OF CARE
Problem: Potential for Falls  Goal: Patient will remain free of falls  Description: INTERVENTIONS:  - Educate patient/family on patient safety including physical limitations  - Instruct patient to call for assistance with activity   - Consult OT/PT to assist with strengthening/mobility   - Keep Call bell within reach  - Keep bed low and locked with side rails adjusted as appropriate  - Keep care items and personal belongings within reach  - Initiate and maintain comfort rounds  - Consider moving patient to room near nurses station  Outcome: Completed

## 2024-09-18 NOTE — PROGRESS NOTES
Bhavna Timmons  tolerated B12 to L Deltoid well with no complications.      Bhavna Timmons is aware of future appt on 10/16/24 @ 5960.     AVS declined by Bhavna Timmons.

## 2024-10-16 ENCOUNTER — CLINICAL SUPPORT (OUTPATIENT)
Dept: BARIATRICS | Facility: CLINIC | Age: 54
End: 2024-10-16

## 2024-10-16 ENCOUNTER — HOSPITAL ENCOUNTER (OUTPATIENT)
Dept: INFUSION CENTER | Facility: CLINIC | Age: 54
Discharge: HOME | End: 2024-10-16
Payer: COMMERCIAL

## 2024-10-16 VITALS
SYSTOLIC BLOOD PRESSURE: 114 MMHG | WEIGHT: 126.6 LBS | RESPIRATION RATE: 17 BRPM | HEART RATE: 59 BPM | DIASTOLIC BLOOD PRESSURE: 74 MMHG | HEIGHT: 62 IN | TEMPERATURE: 96.5 F | BODY MASS INDEX: 23.3 KG/M2

## 2024-10-16 DIAGNOSIS — R63.5 ABNORMAL WEIGHT GAIN: Primary | ICD-10-CM

## 2024-10-16 DIAGNOSIS — N95.1 MENOPAUSAL SYMPTOMS: ICD-10-CM

## 2024-10-16 DIAGNOSIS — E53.8 B12 DEFICIENCY: ICD-10-CM

## 2024-10-16 DIAGNOSIS — D50.8 OTHER IRON DEFICIENCY ANEMIA: Primary | ICD-10-CM

## 2024-10-16 PROCEDURE — 96372 THER/PROPH/DIAG INJ SC/IM: CPT

## 2024-10-16 PROCEDURE — RECHECK

## 2024-10-16 RX ORDER — PROGESTERONE 200 MG/1
200 CAPSULE ORAL
Qty: 90 CAPSULE | Refills: 3 | Status: SHIPPED | OUTPATIENT
Start: 2024-10-16

## 2024-10-16 RX ORDER — ESTRADIOL 10 UG/1
1 INSERT VAGINAL 2 TIMES WEEKLY
Qty: 24 TABLET | Refills: 3 | Status: SHIPPED | OUTPATIENT
Start: 2024-10-17

## 2024-10-16 RX ORDER — PROGESTERONE 200 MG/1
1 CAPSULE ORAL
Qty: 90 CAPSULE | Refills: 0 | OUTPATIENT
Start: 2024-10-16

## 2024-10-16 RX ORDER — CYANOCOBALAMIN 1000 UG/ML
1000 INJECTION, SOLUTION INTRAMUSCULAR; SUBCUTANEOUS ONCE
OUTPATIENT
Start: 2024-11-13

## 2024-10-16 RX ORDER — ESTRADIOL 1 MG/1
1 TABLET ORAL DAILY
Qty: 90 TABLET | Refills: 3 | Status: SHIPPED | OUTPATIENT
Start: 2024-10-16

## 2024-10-16 RX ORDER — CYANOCOBALAMIN 1000 UG/ML
1000 INJECTION, SOLUTION INTRAMUSCULAR; SUBCUTANEOUS ONCE
Status: COMPLETED | OUTPATIENT
Start: 2024-10-16 | End: 2024-10-16

## 2024-10-16 RX ORDER — ESTRADIOL 10 UG/1
INSERT VAGINAL
Qty: 8 TABLET | Refills: 0 | OUTPATIENT
Start: 2024-10-16

## 2024-10-16 RX ADMIN — CYANOCOBALAMIN 1000 MCG: 1000 INJECTION, SOLUTION INTRAMUSCULAR at 12:40

## 2024-10-16 NOTE — PROGRESS NOTES
Patient last visit weight:137lb  Patient current visit weight:126.6lb    If you are taking phentermine or other oral weight loss medications, are you experiencing any of the following symptoms:  Headache:   Blurred Vision:   Chest Pain:   Palpitations:  Insomnia:   SPECIFY ORAL MEDICATION AND DOSAGE:     If you are taking an injectable medication,  are you experiencing any of the following symptoms:  Bloating:  No  Nausea: No  Vomiting:  No  Constipation:  No  Diarrhea: No  SPECIFY INJECTABLE MEDICATION AND CURRENT DOSAGE:Wegovy 1mg  Pt stop the medication , due to her losing to much wt.    Vitals:    Is BP less than 100/60? No  Is BP greater than 140/90? No  Is HR greater than 100? No  **If yes to any of the above, have patient relax and repeat in 5-10 minutes**    Repeat values:    Is BP less than 100/60?  Is BP greater than 140/90?  Is HR greater than 100?  **If values remain outside of ranges above, please consult provider for next steps**

## 2024-10-16 NOTE — PROGRESS NOTES
Patient presents for B12 injection. B12 given intramuscular [facility] without incident. Patient declined AVS but she is aware of appointment on 11/13/24 at 12:30pm.

## 2024-11-14 ENCOUNTER — HOSPITAL ENCOUNTER (OUTPATIENT)
Dept: INFUSION CENTER | Facility: CLINIC | Age: 54
Discharge: HOME | End: 2024-11-14
Payer: COMMERCIAL

## 2024-11-14 DIAGNOSIS — E53.8 B12 DEFICIENCY: ICD-10-CM

## 2024-11-14 DIAGNOSIS — D50.8 OTHER IRON DEFICIENCY ANEMIA: Primary | ICD-10-CM

## 2024-11-14 PROCEDURE — 96372 THER/PROPH/DIAG INJ SC/IM: CPT

## 2024-11-14 RX ORDER — CYANOCOBALAMIN 1000 UG/ML
1000 INJECTION, SOLUTION INTRAMUSCULAR; SUBCUTANEOUS ONCE
OUTPATIENT
Start: 2024-12-11

## 2024-11-14 RX ORDER — CYANOCOBALAMIN 1000 UG/ML
1000 INJECTION, SOLUTION INTRAMUSCULAR; SUBCUTANEOUS ONCE
Status: COMPLETED | OUTPATIENT
Start: 2024-11-14 | End: 2024-11-14

## 2024-11-14 RX ADMIN — CYANOCOBALAMIN 1000 MCG: 1000 INJECTION INTRAMUSCULAR; SUBCUTANEOUS at 12:35

## 2024-11-14 NOTE — PROGRESS NOTES
Pt tolerated B12 injection to left arm without incident. Pt declined AVS, aware of next appt 12/12 at 12.

## 2024-12-12 ENCOUNTER — HOSPITAL ENCOUNTER (OUTPATIENT)
Dept: INFUSION CENTER | Facility: HOSPITAL | Age: 54
Discharge: HOME | End: 2024-12-12
Attending: INTERNAL MEDICINE

## 2024-12-12 ENCOUNTER — HOSPITAL ENCOUNTER (OUTPATIENT)
Dept: INFUSION CENTER | Facility: CLINIC | Age: 54
End: 2024-12-12

## 2024-12-18 DIAGNOSIS — E53.8 B12 DEFICIENCY: Primary | ICD-10-CM

## 2024-12-18 DIAGNOSIS — D50.8 OTHER IRON DEFICIENCY ANEMIA: ICD-10-CM

## 2024-12-18 RX ORDER — CYANOCOBALAMIN 1000 UG/ML
1000 INJECTION, SOLUTION INTRAMUSCULAR; SUBCUTANEOUS ONCE
Status: CANCELLED | OUTPATIENT
Start: 2024-12-21

## 2024-12-21 ENCOUNTER — HOSPITAL ENCOUNTER (OUTPATIENT)
Dept: INFUSION CENTER | Facility: HOSPITAL | Age: 54
Discharge: HOME | End: 2024-12-21
Attending: INTERNAL MEDICINE
Payer: COMMERCIAL

## 2024-12-21 DIAGNOSIS — D50.8 OTHER IRON DEFICIENCY ANEMIA: ICD-10-CM

## 2024-12-21 DIAGNOSIS — E53.8 B12 DEFICIENCY: Primary | ICD-10-CM

## 2024-12-21 RX ORDER — CYANOCOBALAMIN 1000 UG/ML
1000 INJECTION, SOLUTION INTRAMUSCULAR; SUBCUTANEOUS ONCE
OUTPATIENT
Start: 2025-01-18

## 2024-12-21 RX ORDER — CYANOCOBALAMIN 1000 UG/ML
1000 INJECTION, SOLUTION INTRAMUSCULAR; SUBCUTANEOUS ONCE
Status: COMPLETED | OUTPATIENT
Start: 2024-12-21 | End: 2024-12-21

## 2024-12-21 RX ADMIN — CYANOCOBALAMIN 1000 MCG: 1000 INJECTION, SOLUTION INTRAMUSCULAR; SUBCUTANEOUS at 13:38

## 2024-12-21 NOTE — PROGRESS NOTES
Bhavna Timmons  tolerated Vitamin B12 with no complications.      Bhavna Timmons is aware of future appt on 1/17/25 at 4:00 pm.     AVS printed and given to Bhavna Timmons:  No (Declined by Bhavna Timmons)

## 2025-01-15 DIAGNOSIS — E53.8 B12 DEFICIENCY: Primary | ICD-10-CM

## 2025-01-15 DIAGNOSIS — D50.8 OTHER IRON DEFICIENCY ANEMIA: ICD-10-CM

## 2025-01-15 RX ORDER — CYANOCOBALAMIN 1000 UG/ML
1000 INJECTION, SOLUTION INTRAMUSCULAR; SUBCUTANEOUS ONCE
Status: CANCELLED | OUTPATIENT
Start: 2025-01-17

## 2025-01-17 ENCOUNTER — HOSPITAL ENCOUNTER (OUTPATIENT)
Dept: INFUSION CENTER | Facility: HOSPITAL | Age: 55
End: 2025-01-17
Attending: INTERNAL MEDICINE
Payer: COMMERCIAL

## 2025-01-17 DIAGNOSIS — D50.8 OTHER IRON DEFICIENCY ANEMIA: Primary | ICD-10-CM

## 2025-01-17 DIAGNOSIS — E53.8 B12 DEFICIENCY: ICD-10-CM

## 2025-01-17 PROCEDURE — 96372 THER/PROPH/DIAG INJ SC/IM: CPT

## 2025-01-17 RX ORDER — CYANOCOBALAMIN 1000 UG/ML
1000 INJECTION, SOLUTION INTRAMUSCULAR; SUBCUTANEOUS ONCE
Status: COMPLETED | OUTPATIENT
Start: 2025-01-17 | End: 2025-01-17

## 2025-01-17 RX ORDER — CYANOCOBALAMIN 1000 UG/ML
1000 INJECTION, SOLUTION INTRAMUSCULAR; SUBCUTANEOUS ONCE
OUTPATIENT
Start: 2025-02-14

## 2025-01-17 RX ADMIN — CYANOCOBALAMIN 1000 MCG: 1000 INJECTION, SOLUTION INTRAMUSCULAR at 16:21

## 2025-01-17 NOTE — PROGRESS NOTES
Bhavna Timmons  tolerated b12 well with no complications. Aware of future appt on 2/14/25 at 1230. AVS declined. Patient left clinic ambulatory.

## 2025-01-21 DIAGNOSIS — D50.8 OTHER IRON DEFICIENCY ANEMIA: ICD-10-CM

## 2025-01-21 DIAGNOSIS — E53.8 B12 DEFICIENCY: Primary | ICD-10-CM

## 2025-02-14 ENCOUNTER — HOSPITAL ENCOUNTER (OUTPATIENT)
Dept: INFUSION CENTER | Facility: HOSPITAL | Age: 55
Discharge: HOME | End: 2025-02-14
Attending: INTERNAL MEDICINE

## 2025-02-17 ENCOUNTER — OFFICE VISIT (OUTPATIENT)
Age: 55
End: 2025-02-17
Payer: COMMERCIAL

## 2025-02-17 VITALS
SYSTOLIC BLOOD PRESSURE: 130 MMHG | WEIGHT: 144.4 LBS | BODY MASS INDEX: 26.57 KG/M2 | HEIGHT: 62 IN | TEMPERATURE: 97 F | HEART RATE: 47 BPM | DIASTOLIC BLOOD PRESSURE: 70 MMHG

## 2025-02-17 DIAGNOSIS — E53.8 B12 DEFICIENCY: Primary | ICD-10-CM

## 2025-02-17 DIAGNOSIS — K59.04 CHRONIC IDIOPATHIC CONSTIPATION: ICD-10-CM

## 2025-02-17 DIAGNOSIS — E78.5 DYSLIPIDEMIA: ICD-10-CM

## 2025-02-17 DIAGNOSIS — Z86.39 HISTORY OF OBESITY: Primary | ICD-10-CM

## 2025-02-17 DIAGNOSIS — Z98.84 BARIATRIC SURGERY STATUS: ICD-10-CM

## 2025-02-17 DIAGNOSIS — K21.9 GERD (GASTROESOPHAGEAL REFLUX DISEASE): ICD-10-CM

## 2025-02-17 DIAGNOSIS — D50.8 OTHER IRON DEFICIENCY ANEMIA: ICD-10-CM

## 2025-02-17 PROCEDURE — 99214 OFFICE O/P EST MOD 30 MIN: CPT | Performed by: PHYSICIAN ASSISTANT

## 2025-02-17 RX ORDER — TIRZEPATIDE 2.5 MG/.5ML
2.5 INJECTION, SOLUTION SUBCUTANEOUS WEEKLY
Qty: 2 ML | Refills: 0 | Status: SHIPPED | OUTPATIENT
Start: 2025-02-17 | End: 2025-03-17

## 2025-02-17 RX ORDER — CYANOCOBALAMIN 1000 UG/ML
1000 INJECTION, SOLUTION INTRAMUSCULAR; SUBCUTANEOUS ONCE
OUTPATIENT
Start: 2025-02-19

## 2025-02-17 NOTE — PROGRESS NOTES
Assessment/Plan:  Bhavna was seen today for follow-up.    Diagnoses and all orders for this visit:    History of obesity  -     tirzepatide (Zepbound) 2.5 mg/0.5 mL auto-injector; Inject 0.5 mL (2.5 mg total) under the skin once a week for 28 days    Bariatric surgery status  -     tirzepatide (Zepbound) 2.5 mg/0.5 mL auto-injector; Inject 0.5 mL (2.5 mg total) under the skin once a week for 28 days    BMI 27.0-27.9,adult  -     tirzepatide (Zepbound) 2.5 mg/0.5 mL auto-injector; Inject 0.5 mL (2.5 mg total) under the skin once a week for 28 days    GERD (gastroesophageal reflux disease)  -     tirzepatide (Zepbound) 2.5 mg/0.5 mL auto-injector; Inject 0.5 mL (2.5 mg total) under the skin once a week for 28 days    Dyslipidemia  -     tirzepatide (Zepbound) 2.5 mg/0.5 mL auto-injector; Inject 0.5 mL (2.5 mg total) under the skin once a week for 28 days       No problem-specific Assessment & Plan notes found for this encounter.    - Labs reviewed: As below.      General Recommendations:  Nutrition:  Eat breakfast daily.  Do not skip meals.     Food log (ie.) www.Num[employer].com, sparkpeople.com, loseit.com, calorieking.com, etc.    Practice mindful eating.  Be sure to set aside time to eat, eat slowly, and savor your food.    Hydration:    At least 64oz of water daily.  No sugar sweetened beverages.  No juice (eat the fruit instead).    Exercise:  Studies have shown that the ideal exercise goal is somewhere between 150 to 300 minutes of moderate intensity exercise a week.  Start with exercising 10 minutes every other day and gradually increase physical activity with a goal of at least 150 minutes of moderate intensity exercise a week, divided over at least 3 days a week.  An example of this would be exercising 30 minutes a day, 5 days a week.  Resistance training can increase muscle mass and increase our resting metabolic rate.   FULL BODY resistance training is recommended 2-3 times a week.  Do not do this on  consecutive days to allow for muscle recovery.    Aim for a bare minimum 5000 steps, even on days you do not exercise.    Monitoring:   Weigh yourself daily.  If this causes undue stress, then just weigh yourself once a week.  Weigh yourself the same time of the day with the same amount of clothing on.  Preferably this should be done after waking up, before you eat, and with no clothing or minimal clothing on.    Specific Goals:  Food log (ie.) www.[employer].com,sparkpeople.com,loseit.com,CitySpark.com,etc.   No sugary beverages. At least 64oz of water daily.  Goal protein intake of 60-80 grams per day    Calorie goal:  refer to dietitian for meal planning (Provided with meal plan to follow).    Return visit:  2 months  1)  RX Zepbound 2.5mg x 4 weeks and then increase to 5mg once weekly. Emphasized the importance of adherence to the medication schedule and lifestyle modifications, including diet and exercise. Provided patient with written materials on Zepbound usage, dietary plans, and exercise recommendations. Discussed the importance of regular monitoring and follow up to ensure the safety and effectiveness of the treatment.  Education provided on side effects, how to monitor and when to see medical attention.  Patient received training on self-administration of the injection. Goal of treatment is to attain a weight loss of approximately 5-10% TBW within next 3-6 months.  Goal is to achieve weight loss to improve overall health and reduce risks associated with obesity and weight related comorbidities.      I have sent Zebound to your pharmacy. The prior authorization process will been done through our prior authorization team and can take up to 3-4 weeks to process through the insurance.     - Start Zepbound 2.5 mg subcutaneously weekly. After you have taken the second pen, please give me an update, as we will likely increase the dose the next month if you are tolerating it well.    - Side effects of  Zepbound include nausea, vomiting, diarrhea, or constipation. Keep an eye on your heart rate while on Zepbound. If you resting heart rate is greater than 100 beats per minutes, please notify me. If you develop severe abdominal pain, stop Zepbound and go to the emergency room, as that could be a sign of pancreatitis.     - Please notify me if you have surgery, upper endoscopy, or colonoscopy scheduled, as we typically hold Zepbound for one week prior to the procedure.     - Zepbound can reduce the effectiveness of oral hormonal birth control (birth control pills). Recommend a barrier backup method such as condoms to prevent pregnancy.       2) Nutrition RX:  - start tracking intake  - focus on protein- goal is 30 grams per meal; 90 grams per day  - focus on increasing fiber first by increasing vegetable servings per day  - do not skip breakfast and goal water intake 64 oz daily    Physical Activity RX:  - Goal is 150-200 mins of activity weekly.  Try to include at least 2 strength training sessions; increasing lean body mass will really help you to lose weight and maintain weight loss.      Total time spent reviewing chart, interviewing patient, examining patient, discussing plan, answering all questions, and documentin min.       ______________________________________________________________________        Subjective:   Chief Complaint   Patient presents with    Follow-up     Mwm f/u       HPI: Bhavna Timmons  is a 54 y.o. female with history of bariatric surgery, dyslipidemia, impaired fasting glucose, GERD and excess weight, here to pursue weight loss management.  Previous notes and records have been reviewed.    Patient has gained back 12% of her TBW back over the past 6 months.  Hunger and food noise is coming back.     Physical activity:  Patient has been walking 4-5 days per week.  She has some home equipment that she can use as well.      Nutrition/ Food recall  B- boiled eggs, bagel, oatmeal   L -  leftovers (protein (chicken, shrimp), vegetable) starch   D- cooks at home protein (chicken baked or broiled), salad, little vegetables   S- hungry so will snack on chips, pastries     Hydration: 40-50 oz water, no soda no juice       HPI  Wt Readings from Last 20 Encounters:   02/17/25 65.5 kg (144 lb 6.5 oz)   10/16/24 57.4 kg (126 lb 9.6 oz)   08/27/24 60.3 kg (133 lb)   08/13/24 61.7 kg (136 lb)   08/06/24 61.7 kg (136 lb)   07/23/24 61.8 kg (136 lb 3.2 oz)   07/19/24 62.5 kg (137 lb 12.8 oz)   04/10/24 64.6 kg (142 lb 6.4 oz)   03/04/24 67 kg (147 lb 12.8 oz)   02/09/24 69.1 kg (152 lb 6.4 oz)   01/25/24 69.9 kg (154 lb)   11/29/23 69.4 kg (153 lb)   11/21/23 68.5 kg (151 lb)   10/02/23 69.1 kg (152 lb 5.4 oz)   08/23/23 69.1 kg (152 lb 6.4 oz)   07/24/23 70.3 kg (155 lb)   06/27/23 68.8 kg (151 lb 9.6 oz)   11/11/22 63 kg (139 lb)   11/10/22 62.1 kg (137 lb)   08/11/22 62.1 kg (137 lb)     Excess Weight:    What has been tried: Diet and Exercise, Physician Supervised Weight Loss Program, and Commercial Weight Loss Programs-ie. Weight Watchers, [employer]Web, Nutrisystem, etc.        Hunger/Cravings: hunger at night   Dining out: rare  Hydration: water  Alcohol:  Smoking:  Exercise: walking   Weight Monitoring:      Past Medical History:   Diagnosis Date    Abnormal ECG     Abnormal stress ECG 04/02/2021    Anemia     Chest pain due to GERD 02/17/2021    HLD (hyperlipidemia)     Other hyperlipidemia 01/12/2022    Palpitations 02/17/2021    Sarcoidosis      Patient denies personal and family history of  pancreatitis, thyroid cancer, MEN-2 tumors.  Denies any hx of glaucoma, seizures, kidney stones, gallstones.  Denies Hx of CAD, PAD, palpitations, arrhythmia.   Denies uncontrolled anxiety or depression, suicidal behavior or thinking , insomnia or sleep disturbance.   Past Surgical History:   Procedure Laterality Date    ANKLE SURGERY Right 2018    COLONOSCOPY  2018    GASTRECTOMY SLEEVE LAPAROSCOPIC  july 8th 2021  "   KNEE ARTHROSCOPY W/ MENISCAL REPAIR Left 2013    KNEE SURGERY  8/6/2013    TUBAL LIGATION  1997    UPPER GASTROINTESTINAL ENDOSCOPY       The following portions of the patient's history were reviewed and updated as appropriate: allergies, current medications, past family history, past medical history, past social history, past surgical history, and problem list.    Review Of Systems:  Review of Systems   Constitutional:  Positive for fatigue.   HENT: Negative.     Eyes: Negative.    Gastrointestinal:  Negative for constipation, diarrhea and nausea.   Genitourinary: Negative.    Musculoskeletal: Negative.    Skin: Negative.    Allergic/Immunologic: Negative.    Neurological: Negative.  Negative for headaches.   Hematological: Negative.    Psychiatric/Behavioral: Negative.         Objective:  /70 (Patient Position: Sitting, Cuff Size: Standard)   Pulse (!) 47   Temp (!) 97 °F (36.1 °C) (Tympanic)   Ht 5' 2\" (1.575 m)   Wt 65.5 kg (144 lb 6.5 oz)   BMI 26.41 kg/m²   Physical Exam  Vitals reviewed.   Constitutional:       Appearance: She is obese.   HENT:      Head: Normocephalic.      Mouth/Throat:      Mouth: Mucous membranes are moist.   Eyes:      Pupils: Pupils are equal, round, and reactive to light.   Cardiovascular:      Rate and Rhythm: Normal rate and regular rhythm.   Pulmonary:      Effort: Pulmonary effort is normal.      Breath sounds: Normal breath sounds.   Abdominal:      General: Bowel sounds are normal.      Palpations: Abdomen is soft.   Musculoskeletal:         General: Normal range of motion.      Cervical back: Normal range of motion.   Skin:     General: Skin is warm and dry.   Neurological:      General: No focal deficit present.      Mental Status: She is alert.   Psychiatric:         Mood and Affect: Mood normal.         Thought Content: Thought content normal.         Judgment: Judgment normal.         Labs and Imaging  Recent labs and imaging have been personally reviewed.  Lab " Results   Component Value Date    WBC 5.24 07/23/2024    HGB 15.9 (H) 07/23/2024    HCT 49.5 (H) 07/23/2024    MCV 99 (H) 07/23/2024     07/23/2024     Lab Results   Component Value Date    SODIUM 141 07/23/2024    K 4.2 07/23/2024     07/23/2024    CO2 23 07/23/2024    AGAP 13 07/23/2024    BUN 18 07/23/2024    CREATININE 1.03 07/23/2024    GLUC 91 04/09/2021    GLUF 73 07/23/2024    CALCIUM 10.0 07/23/2024    AST 16 07/23/2024    ALT 17 07/23/2024    ALKPHOS 74 07/23/2024    TP 7.5 07/23/2024    TBILI 0.63 07/23/2024    EGFR 61 07/23/2024     Lab Results   Component Value Date    HGBA1C 5.3 07/23/2024     Lab Results   Component Value Date    DWU8ZFMBFAUU 1.693 01/17/2024     Lab Results   Component Value Date    CHOLESTEROL 228 (H) 07/23/2024     Lab Results   Component Value Date    HDL 65 07/23/2024     Lab Results   Component Value Date    TRIG 111 07/23/2024     Lab Results   Component Value Date    LDLCALC 141 (H) 07/23/2024

## 2025-02-18 RX ORDER — LUBIPROSTONE 24 UG/1
24 CAPSULE ORAL
Qty: 30 CAPSULE | Refills: 3 | Status: SHIPPED | OUTPATIENT
Start: 2025-02-18

## 2025-02-18 NOTE — TELEPHONE ENCOUNTER
"PCP never prescribed. Ana Booth DO was the last (and only) doctor it was dispensed under on 3/4/24. It was prescribed as \"Take 1 capsule (24 mcg total) by mouth 2 (two) times a day with meals\". It hasn't been prescribed since. It appears it was added back to the pts chart to \"Take 1 capsule (24 mcg total) by mouth daily with breakfast\". Please forward to Dr. Razo to review and determine if she would like to take over.  "

## 2025-03-03 ENCOUNTER — TELEPHONE (OUTPATIENT)
Dept: GASTROENTEROLOGY | Facility: MEDICAL CENTER | Age: 55
End: 2025-03-03

## 2025-03-17 DIAGNOSIS — E78.5 DYSLIPIDEMIA: ICD-10-CM

## 2025-03-17 DIAGNOSIS — Z86.39 HISTORY OF OBESITY: Primary | ICD-10-CM

## 2025-03-17 DIAGNOSIS — Z98.84 BARIATRIC SURGERY STATUS: ICD-10-CM

## 2025-03-17 DIAGNOSIS — K21.9 GASTROESOPHAGEAL REFLUX DISEASE WITHOUT ESOPHAGITIS: ICD-10-CM

## 2025-03-17 RX ORDER — TIRZEPATIDE 5 MG/.5ML
5 INJECTION, SOLUTION SUBCUTANEOUS WEEKLY
Qty: 2 ML | Refills: 2 | Status: SHIPPED | OUTPATIENT
Start: 2025-03-17 | End: 2025-06-09

## 2025-03-25 DIAGNOSIS — E53.8 B12 DEFICIENCY: Primary | ICD-10-CM

## 2025-03-25 RX ORDER — CYANOCOBALAMIN 1000 UG/ML
1000 INJECTION, SOLUTION INTRAMUSCULAR; SUBCUTANEOUS
Qty: 1 ML | Refills: 11 | Status: SHIPPED | OUTPATIENT
Start: 2025-03-25

## 2025-04-16 ENCOUNTER — OFFICE VISIT (OUTPATIENT)
Age: 55
End: 2025-04-16
Payer: COMMERCIAL

## 2025-04-16 VITALS
WEIGHT: 133.2 LBS | SYSTOLIC BLOOD PRESSURE: 122 MMHG | HEIGHT: 62 IN | HEART RATE: 53 BPM | BODY MASS INDEX: 24.51 KG/M2 | TEMPERATURE: 98.1 F | DIASTOLIC BLOOD PRESSURE: 60 MMHG

## 2025-04-16 DIAGNOSIS — K21.9 GASTROESOPHAGEAL REFLUX DISEASE WITHOUT ESOPHAGITIS: ICD-10-CM

## 2025-04-16 DIAGNOSIS — E78.5 DYSLIPIDEMIA: ICD-10-CM

## 2025-04-16 DIAGNOSIS — Z86.39 HISTORY OF OBESITY: Primary | ICD-10-CM

## 2025-04-16 DIAGNOSIS — Z98.84 BARIATRIC SURGERY STATUS: ICD-10-CM

## 2025-04-16 PROCEDURE — 99214 OFFICE O/P EST MOD 30 MIN: CPT | Performed by: PHYSICIAN ASSISTANT

## 2025-04-16 RX ORDER — TIRZEPATIDE 2.5 MG/.5ML
2.5 INJECTION, SOLUTION SUBCUTANEOUS WEEKLY
Qty: 2 ML | Refills: 0 | Status: SHIPPED | OUTPATIENT
Start: 2025-04-16 | End: 2025-05-14

## 2025-04-16 NOTE — PROGRESS NOTES
Assessment/Plan:  Bhavna was seen today for follow-up.    Diagnoses and all orders for this visit:    History of obesity  -     tirzepatide (Zepbound) 2.5 mg/0.5 mL auto-injector; Inject 0.5 mL (2.5 mg total) under the skin once a week for 28 days    Bariatric surgery status  -     tirzepatide (Zepbound) 2.5 mg/0.5 mL auto-injector; Inject 0.5 mL (2.5 mg total) under the skin once a week for 28 days    Gastroesophageal reflux disease without esophagitis  -     tirzepatide (Zepbound) 2.5 mg/0.5 mL auto-injector; Inject 0.5 mL (2.5 mg total) under the skin once a week for 28 days    BMI 27.0-27.9,adult  -     tirzepatide (Zepbound) 2.5 mg/0.5 mL auto-injector; Inject 0.5 mL (2.5 mg total) under the skin once a week for 28 days    Dyslipidemia  -     tirzepatide (Zepbound) 2.5 mg/0.5 mL auto-injector; Inject 0.5 mL (2.5 mg total) under the skin once a week for 28 days         No problem-specific Assessment & Plan notes found for this encounter.    - Labs reviewed: As below.      General Recommendations:  Nutrition:  Eat breakfast daily.  Do not skip meals.     Food log (ie.) www.LoHaria.com, sparkpeople.com, loseit.com, calorieking.com, etc.    Practice mindful eating.  Be sure to set aside time to eat, eat slowly, and savor your food.    Hydration:    At least 64oz of water daily.  No sugar sweetened beverages.  No juice (eat the fruit instead).    Exercise:  Studies have shown that the ideal exercise goal is somewhere between 150 to 300 minutes of moderate intensity exercise a week.  Start with exercising 10 minutes every other day and gradually increase physical activity with a goal of at least 150 minutes of moderate intensity exercise a week, divided over at least 3 days a week.  An example of this would be exercising 30 minutes a day, 5 days a week.  Resistance training can increase muscle mass and increase our resting metabolic rate.   FULL BODY resistance training is recommended 2-3 times a week.  Do not  do this on consecutive days to allow for muscle recovery.    Aim for a bare minimum 5000 steps, even on days you do not exercise.    Monitoring:   Weigh yourself daily.  If this causes undue stress, then just weigh yourself once a week.  Weigh yourself the same time of the day with the same amount of clothing on.  Preferably this should be done after waking up, before you eat, and with no clothing or minimal clothing on.    Specific Goals:  Food log (ie.) www.[employer].com,sparkpeople.com,[employer] Services[employer].com,[employer].com,etc.   No sugary beverages. At least 64oz of water daily.  Goal protein intake of 60-80 grams per day    Calorie goal:  refer to dietitian for meal planning (Provided with meal plan to follow).    Return visit:  2 months  1) RX Zepbound 2.5mg as patient is having side effects on 5mg we will try to do an additional month with the lower dose.     GLP-1 Managing Side Effects Tips:  - focus on small frequent meals  - moderate sugar and fat intake  - change the injection site (abdomen --> thigh--> back of arm)  - eat protein, crackers, mints and shobha-based drinks  - nighttime injections  - drink plenty of water  - consider fiber supplements like miralax    Tips for Nausea:  - Don't drink and eat simultaneously: separate fluids 30-60 minutes before and after meals when experiencing nausea or if you notice you become full quickly  - Choose mild smelling foods- strong smells can exacerbate nausea  - Shobha and peppermint- consider drinking shobha or peppermint tea, which can help alleviate symptoms  - Eat- don't skip meals, if you have nausea, it is understandable that you may not feel like eating. However, skipping meals is generally not recommended as this can lead to lower blood sugar and fatigue. Furthermore, it is essential to consume adequate protein during weight loss. You can opt for a protein shake, a meal replacement supplement, bone broth or soup.     Tips for Constipation:   - Drink plenty of  water  - Eat food with a high fiber content: increase your fiber intake by consuming these types of foods:   Eat more whole grain products like barley, oats, farro, brown or wild rice and quinoa.    Look for choices with 100% whole wheat, rye, oats or bran as the first ingredient listed    Check nutrition fact labels and choose products with 4gm of dietary fiber or more per serving   Add more beans to your diet   Eat more fresh fruits and vegetables with skins on them    Exercise- increase physical activity as it helps stimulate gastric mobility, which moves stool through the digestive tract  Zepbound Instructions:    .  - Please message me when you have 2 pens left from the prescription so there are no lapses in treatment.  - Visit Zepbound.com for further information/injection instructions.   - Take precautions to avoid dehydration. Aim for 64-80 oz of fluids/day  -Stop eating before you feel full  -Instructed  to administer a missed dose as soon as possible within 4 days. If more than 4 days have passed, skip the missed dose, administer the next dose on the regularly scheduled day, and resume the regular once-weekly dosing schedule  -Please eat small frequent meals to help reduce nausea. Avoid excessively fatty meals fried foods. Lemon water and saltine crackers may help with this.   - Side effects of Zepbound discussed: nausea, vomiting, diarrhea, and constipation. If you experience fever, nausea/vomiting, and pain radiating to your back this may be a sign of pancreatitis. Please go to the emergency room if this occurs.  - Consider OTC bowel regimen including stool softeners, fiber supplements to regularize bowel movements while on medication. MiraLAX can be used if needed  - - If on oral birth control a 2nd method of birth control is recommended during the 1st 8 weeks of therapy and for 4 weeks after any dosage change.   - Patient understands the side effects of the medication and proper administration. Patient  agrees with the treatment plan and all questions were answered.    Subcutaneous injection:  Inject subQ into the thigh, abdomen, or upper arm; rotate injection sites.  Administer at any time of day, with or without meals.  Administer separately from insulin (do not mix the products); may inject both medications in the same body region but not adjacent to each other.  The day of the weekly administration can be changed as long as the time between the 2 doses is at least 3 days (72 hours)  Administer a missed dose as soon as possible within 4 days (96 hours) of missed dose; if more than 4 days have passed, skip the missed dose and administer next dose on regularly scheduled day and resume regular once weekly dosing schedule      2) Nutrition RX:  - start tracking intake  - focus on protein- goal is 30 grams per meal; 90 grams per day  - focus on increasing fiber first by increasing vegetable servings per day  - do not skip breakfast and goal water intake 64 oz daily    Physical Activity RX:  - Goal is 150-200 mins of activity weekly.  Try to include at least 2 strength training sessions; increasing lean body mass will really help you to lose weight and maintain weight loss.      Total time spent reviewing chart, interviewing patient, examining patient, discussing plan, answering all questions, and documentin min.       ______________________________________________________________________        Subjective:   Chief Complaint   Patient presents with    Follow-up     [facility] 2mth f/u       HPI: Bhavna Timmons  is a 54 y.o. female with history of bariatric surgery, dyslipidemia, impaired fasting glucose, GERD and excess weight, here to pursue weight loss management.  Previous notes and records have been reviewed.    Patient returns for follow up; she has been on Zepbound 2.5mg weekly and took 1 dose of 5mg but was having nausea and diarrhea on this dose and appetite was pretty suppressed where she was not too interested  in eating.     Physical Activity:   Walking and stretching   Gardening, watching her granddaughter and playing     Nutrition:  Focusing on protein and fiber   Chicken, salad  Shrimp   B- oatmeal, boiled eggs  Cottage cheese and fruit   Almonds     Patient has lost 11 lbs since our last visit.     Weight History:      Patient has gained back 12% of her TBW back over the past 6 months.  Hunger and food noise is coming back.     Physical activity:  Patient has been walking 4-5 days per week.  She has some home equipment that she can use as well.      Nutrition/ Food recall  B- boiled eggs, bagel, oatmeal   L - leftovers (protein (chicken, shrimp), vegetable) starch   D- cooks at home protein (chicken baked or broiled), salad, little vegetables   S- hungry so will snack on chips, pastries     Hydration: 40-50 oz water, no soda no juice       HPI  Wt Readings from Last 20 Encounters:   04/16/25 60.4 kg (133 lb 3.2 oz)   02/17/25 65.5 kg (144 lb 6.5 oz)   10/16/24 57.4 kg (126 lb 9.6 oz)   08/27/24 60.3 kg (133 lb)   08/13/24 61.7 kg (136 lb)   08/06/24 61.7 kg (136 lb)   07/23/24 61.8 kg (136 lb 3.2 oz)   07/19/24 62.5 kg (137 lb 12.8 oz)   04/10/24 64.6 kg (142 lb 6.4 oz)   03/04/24 67 kg (147 lb 12.8 oz)   02/09/24 69.1 kg (152 lb 6.4 oz)   01/25/24 69.9 kg (154 lb)   11/29/23 69.4 kg (153 lb)   11/21/23 68.5 kg (151 lb)   10/02/23 69.1 kg (152 lb 5.4 oz)   08/23/23 69.1 kg (152 lb 6.4 oz)   07/24/23 70.3 kg (155 lb)   06/27/23 68.8 kg (151 lb 9.6 oz)   11/11/22 63 kg (139 lb)   11/10/22 62.1 kg (137 lb)     Excess Weight:    What has been tried: Diet and Exercise, Physician Supervised Weight Loss Program, and Commercial Weight Loss Programs-ie. Weight Watchers, Teleus, Nutrisystem, etc.        Hunger/Cravings: hunger at night   Dining out: rare  Hydration: water  Alcohol:  Smoking:  Exercise: walking   Weight Monitoring:      Past Medical History:   Diagnosis Date    Abnormal ECG     Abnormal stress ECG  "04/02/2021    Anemia     Chest pain due to GERD 02/17/2021    HLD (hyperlipidemia)     Other hyperlipidemia 01/12/2022    Palpitations 02/17/2021    Sarcoidosis      Patient denies personal and family history of  pancreatitis, thyroid cancer, MEN-2 tumors.  Denies any hx of glaucoma, seizures, kidney stones, gallstones.  Denies Hx of CAD, PAD, palpitations, arrhythmia.   Denies uncontrolled anxiety or depression, suicidal behavior or thinking , insomnia or sleep disturbance.   Past Surgical History:   Procedure Laterality Date    ANKLE SURGERY Right 2018    COLONOSCOPY  2018    GASTRECTOMY SLEEVE LAPAROSCOPIC  july 8th 2021    KNEE ARTHROSCOPY W/ MENISCAL REPAIR Left 2013    KNEE SURGERY  8/6/2013    TUBAL LIGATION  1997    UPPER GASTROINTESTINAL ENDOSCOPY       The following portions of the patient's history were reviewed and updated as appropriate: allergies, current medications, past family history, past medical history, past social history, past surgical history, and problem list.    Review Of Systems:  Review of Systems   Constitutional:  Positive for fatigue.   HENT: Negative.     Eyes: Negative.    Gastrointestinal:  Negative for constipation, diarrhea and nausea.   Genitourinary: Negative.    Musculoskeletal: Negative.    Skin: Negative.    Allergic/Immunologic: Negative.    Neurological: Negative.  Negative for headaches.   Hematological: Negative.    Psychiatric/Behavioral: Negative.         Objective:  /60 (Patient Position: Sitting, Cuff Size: Standard)   Pulse (!) 53   Temp 98.1 °F (36.7 °C) (Tympanic)   Ht 5' 2\" (1.575 m)   Wt 60.4 kg (133 lb 3.2 oz)   BMI 24.36 kg/m²   Physical Exam  Vitals reviewed.   Constitutional:       Appearance: She is obese.   HENT:      Head: Normocephalic.      Mouth/Throat:      Mouth: Mucous membranes are moist.   Eyes:      Pupils: Pupils are equal, round, and reactive to light.   Cardiovascular:      Rate and Rhythm: Normal rate and regular rhythm. "   Pulmonary:      Effort: Pulmonary effort is normal.      Breath sounds: Normal breath sounds.   Abdominal:      General: Bowel sounds are normal.      Palpations: Abdomen is soft.   Musculoskeletal:         General: Normal range of motion.      Cervical back: Normal range of motion.   Skin:     General: Skin is warm and dry.   Neurological:      General: No focal deficit present.      Mental Status: She is alert.   Psychiatric:         Mood and Affect: Mood normal.         Thought Content: Thought content normal.         Judgment: Judgment normal.         Labs and Imaging  Recent labs and imaging have been personally reviewed.  Lab Results   Component Value Date    WBC 5.24 07/23/2024    HGB 15.9 (H) 07/23/2024    HCT 49.5 (H) 07/23/2024    MCV 99 (H) 07/23/2024     07/23/2024     Lab Results   Component Value Date    SODIUM 141 07/23/2024    K 4.2 07/23/2024     07/23/2024    CO2 23 07/23/2024    AGAP 13 07/23/2024    BUN 18 07/23/2024    CREATININE 1.03 07/23/2024    GLUC 91 04/09/2021    GLUF 73 07/23/2024    CALCIUM 10.0 07/23/2024    AST 16 07/23/2024    ALT 17 07/23/2024    ALKPHOS 74 07/23/2024    TP 7.5 07/23/2024    TBILI 0.63 07/23/2024    EGFR 61 07/23/2024     Lab Results   Component Value Date    HGBA1C 5.3 07/23/2024     Lab Results   Component Value Date    FVG6CZMAWKBB 1.693 01/17/2024     Lab Results   Component Value Date    CHOLESTEROL 228 (H) 07/23/2024     Lab Results   Component Value Date    HDL 65 07/23/2024     Lab Results   Component Value Date    TRIG 111 07/23/2024     Lab Results   Component Value Date    LDLCALC 141 (H) 07/23/2024

## 2025-04-17 ENCOUNTER — TELEPHONE (OUTPATIENT)
Age: 55
End: 2025-04-17

## 2025-04-17 NOTE — TELEPHONE ENCOUNTER
PA for Zepbound APPEALED via [employer]    Spoke Crispin ARIZMENDI Case# [id number]    PA Case: [id number], Status: Denied, Denial Rationale: Zepbound 2.5mg/0.5ml, 2 ml (4 injections) per 28 days has been requested for the treatment of obesity. This is more than the maximum allowed by your plan, which is Zepbound 2.5mg/0.5ml, 2ml (4 injections) every 180 days for treatment initiation. The information provided by your prescriber does not meet [employer]'s Quantity Limit guideline. The requirement(s) not met include: ALL of the following: - The requested quantity (dose) exceeds the program quantity limit for treatment initiation; AND - The requested quantity (dose) exceeds the maximum Food and Drug Administration labeled dose for the requested indication for treatment initiation. Therefore, coverage for Zepbound 2.5mg/0.5ml, 2 ml (4 injections) per 28 days is denied. Note: Zepbound 5mg, 7.5mg, 10mg, 12.5mg, or 15mg, 2ml (4 injections) every 28 days for maintenance will process at your pharmacy through 02/17/2026. Questions? Contact [phone].       []CMM  []SS  []Letter sent to insurance via fax   []Other site or means     All necessary records sent. Will await response from insurance company    Turnaround time for a decision to be made on an appeal could take up to 30 business days

## 2025-04-17 NOTE — TELEPHONE ENCOUNTER
ORTHO CONSULT    Subjective:     Date of Consultation:  December 17, 2021    Referring Physician:  SEVERIANO    Beba John is a 33 y.o.  male who is being seen for right 5th finger open fracture/osteomyelitis. Workup has revealed osteomyelitis not responding to abx.    right 5th digit infection, osteomyelitis. Upon arrival to the ED, intially unable to evaluate due to severe withdrawal symptoms. Per chart review-   Pt reports he slammed his right 5th finger with a hammer 3 weeks ago. [place] he had a blood blister which he burst, and continues to have pain.  He is right-hand dominant.  Polysubstance abuse-  Regular fentanyl, heroin user. Accompanied by Jun Ghosh- pt incarcerated yesterday       Patient Active Problem List    Diagnosis Date Noted    Severe protein-calorie malnutrition ([address] 12/16/2021    Osteomyelitis of finger of right hand ([facility] [address] 12/14/2021     History reviewed. No pertinent family history. Social History     Tobacco Use    Smoking status: Smoker, Current Status Unknown     Packs/day: 1.00     Years: 17.00     Pack years: 17.00    Smokeless tobacco: Never Used   Substance Use Topics    Alcohol use: Not Currently     Past Medical History:   Diagnosis Date    No pertinent past medical history       History reviewed. No pertinent surgical history.    Prior to Admission medications    Not on File     Current Facility-Administered Medications   Medication Dose Route Frequency    vancomycin (VANCOCIN) 1250 mg in  ml infusion  1,250 mg IntraVENous Q12H    lidocaine 4 % patch 1 Patch  1 Patch TransDERmal Q24H    gabapentin (NEURONTIN) capsule 100 mg  100 mg Oral BID    oxyCODONE IR (ROXICODONE) tablet 5 mg  5 mg Oral Q6H PRN    morphine injection 1 mg  1 mg IntraVENous Q8H PRN    promethazine (PHENERGAN) tablet 25 mg  25 mg Oral Q6H PRN    famotidine (PEPCID) tablet 20 mg  20 mg Oral BID    senna-docusate (PERICOLACE) 8.6-50 mg per tablet 2 Tablet  2 Tablet Oral DAILY PA for Zepbound SUBMITTED to [employer]    via    [x]CMM-KEY: [id number]  []Surescripts-Case ID #   []Availity-Auth ID # NDC #   []Faxed to plan   []Other website   []Phone call Case ID #     []PA sent as URGENT    All office notes, labs and other pertaining documents and studies sent. Clinical questions answered. Awaiting determination from insurance company.     Turnaround time for your insurance to make a decision on your Prior Authorization can take 7-21 business days.                  sodium chloride (NS) flush 5-40 mL  5-40 mL IntraVENous Q8H    sodium chloride (NS) flush 5-40 mL  5-40 mL IntraVENous PRN    acetaminophen (TYLENOL) tablet 650 mg  650 mg Oral Q6H PRN    Or    acetaminophen (TYLENOL) suppository 650 mg  650 mg Rectal Q6H PRN    polyethylene glycol (MIRALAX) packet 17 g  17 g Oral DAILY PRN    ondansetron (ZOFRAN ODT) tablet 4 mg  4 mg Oral Q8H PRN    Or    ondansetron (ZOFRAN) injection 4 mg  4 mg IntraVENous Q6H PRN    cefepime (MAXIPIME) 2 g in 0.9% sodium chloride (MBP/ADV) 100 mL MBP  2 g IntraVENous Q8H    LORazepam (ATIVAN) injection 2 mg  2 mg IntraVENous Q1H PRN    LORazepam (ATIVAN) injection 4 mg  4 mg IntraVENous V3X PRN    folic acid (FOLVITE) tablet 1 mg  1 mg Oral DAILY    thiamine mononitrate (B-1) tablet 100 mg  100 mg Oral DAILY    [Held by provider] heparin (porcine) injection 5,000 Units  5,000 Units SubCUTAneous Q12H     Allergies   Allergen Reactions    Codeine Itching        Review of Systems:  Pertinent items are noted in HPI. Objective:     Patient Vitals for the past 8 hrs:   BP Temp Pulse Resp SpO2   21 1123 (!) 143/79  84 11    21 1115 (!) 136/106 98.4 °F (36.9 °C) 77 18 100 %   21 0754 (!) 142/80 98.7 °F (37.1 °C) 60 17 99 %   21 0351 (!) 149/91 98.7 °F (37.1 °C) (!) 57 16 97 %     Temp (24hrs), Av.5 °F (36.9 °C), Min:98.4 °F (36.9 °C), Max:98.7 °F (37.1 °C)      Gen: Well-developed, restless and unsettled. Appears malnourished and underweight,   HEENT: Pink conjunctivae, hearing intact to voice, moist mucous membranes   Neck: Supple  Resp: No respiratory distress   Card: RRR, palpable distal pulse-equal bilaterally, birsk cap refill all distal digits   Abd:  non-distended  Musc: right 5th digit with uniform swelling and erythema about the entire digit, the tip is split with bridged with maturing scab. Intact flex/ext limited ROM 25% compared to LUE. Skin: No skin breakdown noted.  Skin warm, pink, dry-  Heroin Injection site left AC -  No sign of localized infection LUE  Neuro: Cranial nerves are grossly intact, no focal motor weakness, follows commands appropriately   Psych: Good insight, oriented to person, place and time, alert     Imaging Review: EXAM: XR 5TH FINGER RT MIN 2 V  INDICATION: infection, status post trauma. COMPARISON: None. FINDINGS: Three views of the right fifth finger demonstrate patchy osteolysis  involving the tuft of the distal phalanx, concerning for acute osteomyelitis. Diffuse soft tissue swelling.   IMPRESSION  Findings concerning for acute osteomyelitis involving the tuft of the fifth distal phalanx.         Data Review   Recent Results (from the past 24 hour(s))   COVID-19 RAPID TEST    Collection Time: 12/16/21 12:41 PM   Result Value Ref Range    Specimen source Nasopharyngeal      COVID-19 rapid test Not detected NOTD     METABOLIC PANEL, BASIC    Collection Time: 12/17/21  4:35 AM   Result Value Ref Range    Sodium 132 (L) 136 - 145 mmol/L    Potassium 4.0 3.5 - 5.1 mmol/L    Chloride 103 97 - 108 mmol/L    CO2 23 21 - 32 mmol/L    Anion gap 6 5 - 15 mmol/L    Glucose 112 (H) 65 - 100 mg/dL    BUN 14 6 - 20 MG/DL    Creatinine 0.82 0.70 - 1.30 MG/DL    BUN/Creatinine ratio 17 12 - 20      GFR est AA >60 >60 ml/min/1.73m2    GFR est non-AA >60 >60 ml/min/1.73m2    Calcium 9.3 8.5 - 10.1 MG/DL   CBC WITH AUTOMATED DIFF    Collection Time: 12/17/21  4:35 AM   Result Value Ref Range    WBC 14.0 (H) 4.1 - 11.1 K/uL    RBC 5.46 4.10 - 5.70 M/uL    HGB 15.3 12.1 - 17.0 g/dL    HCT 46.0 36.6 - 50.3 %    MCV 84.2 80.0 - 99.0 FL    MCH 28.0 26.0 - 34.0 PG    MCHC 33.3 30.0 - 36.5 g/dL    RDW 14.0 11.5 - 14.5 %    PLATELET 788 (H) 355 - 400 K/uL    MPV 8.4 (L) 8.9 - 12.9 FL    NRBC 0.0 0  WBC    ABSOLUTE NRBC 0.00 0.00 - 0.01 K/uL    NEUTROPHILS 72 32 - 75 %    LYMPHOCYTES 23 12 - 49 %    MONOCYTES 3 (L) 5 - 13 %    EOSINOPHILS 2 0 - 7 %    BASOPHILS 0 0 - 1 %    IMMATURE GRANULOCYTES 0 0.0 - 0.5 %    ABS. NEUTROPHILS 10.1 (H) 1.8 - 8.0 K/UL    ABS. LYMPHOCYTES 3.2 0.8 - 3.5 K/UL    ABS. MONOCYTES 0.4 0.0 - 1.0 K/UL    ABS. EOSINOPHILS 0.3 0.0 - 0.4 K/UL    ABS. BASOPHILS 0.0 0.0 - 0.1 K/UL    ABS. IMM. GRANS. 0.0 0.00 - 0.04 K/UL    DF MANUAL      RBC COMMENTS NORMOCYTIC, NORMOCHROMIC           Assessment/Plan:     RIght fifth finger distal phalanx osteomyelitis    Discussed with hand specialist and reviewed MRI. Best option in this scenario is finger tip/distal phalanx amputation. Will culture. Discussed with patient. The risks of surgery were explained. Risks of infection, blood loss, neurovascular injury, anesthesia risks, and risks secondary to patient comorbidities were explained. Plan for OR npo and cleared.   Continue IV ABX  BC neg x2days  CBC trending down  ID consulted by the medical service      Dana Weiss, DO

## 2025-04-21 ENCOUNTER — OFFICE VISIT (OUTPATIENT)
Dept: DERMATOLOGY | Facility: CLINIC | Age: 55
End: 2025-04-21
Payer: COMMERCIAL

## 2025-04-21 VITALS — BODY MASS INDEX: 24.33 KG/M2 | TEMPERATURE: 98.3 F | WEIGHT: 133 LBS

## 2025-04-21 DIAGNOSIS — L72.0 EPIDERMAL INCLUSION CYST: Primary | ICD-10-CM

## 2025-04-21 PROCEDURE — 99203 OFFICE O/P NEW LOW 30 MIN: CPT | Performed by: STUDENT IN AN ORGANIZED HEALTH CARE EDUCATION/TRAINING PROGRAM

## 2025-04-21 NOTE — PROGRESS NOTES
"[facility] Dermatology Clinic Note     Patient Name: Bhavna Timmons  Encounter Date: 4/21/2025       Have you been cared for by a [facility] Dermatologist in the last 3 years and, if so, which description applies to you? NO. I am considered a \"new\" patient and must complete all patient intake questions. I am of child-bearing potential.     REVIEW OF SYSTEMS:  Have you recently had or currently have any of the following? Recent fever or chills? No  Any non-healing wound? No  Are you pregnant or planning to become pregnant? No  Are you currently or planning to be nursing or breast feeding? No   PAST MEDICAL HISTORY:  Have you personally ever had or currently have any of the following?  If \"YES,\" then please provide more detail. Skin cancer (such as Melanoma, Basal Cell Carcinoma, Squamous Cell Carcinoma?  No  Tuberculosis, HIV/AIDS, Hepatitis B or C: No  Radiation Treatment No   HISTORY OF IMMUNOSUPPRESSION:   Do you have a history of any of the following:  Systemic Immunosuppression such as Diabetes, Biologic or Immunotherapy, Chemotherapy, Organ Transplantation, Bone Marrow Transplantation or Prednsione?  No    Answering \"YES\" requires the addition of the dotphrase \"IMMUNOSUPPRESSED\" as the first diagnosis of the patient's visit.   FAMILY HISTORY:  Any \"first degree relatives\" (parent, brother, sister, or child) with the following?    Skin Cancer, Pancreatic or Other Cancer? No   PATIENT EXPERIENCE:    Do you want the Dermatologist to perform a COMPLETE skin exam today including a clinical examination under the \"bra and underwear\" areas?  NO  If necessary, do we have your permission to call and leave a detailed message on your Preferred Phone number that includes your specific medical information?  Yes      Allergies   Allergen Reactions   • Penicillin G Hives      Current Outpatient Medications:   •  tirzepatide (Zepbound) 2.5 mg/0.5 mL auto-injector, Inject 0.5 mL (2.5 mg total) under the skin once a week for 28 " "days, Disp: 2 mL, Rfl: 0  •  Cholecalciferol (D3 High Potency) 50 MCG (2000 UT) CAPS, , Disp: , Rfl:   •  cyanocobalamin 1,000 mcg/mL, Inject 1 mL (1,000 mcg total) into a muscle every 30 (thirty) days, Disp: 1 mL, Rfl: 11  •  estradiol (Estrace) 1 mg tablet, Take 1 tablet (1 mg total) by mouth daily, Disp: 90 tablet, Rfl: 3  •  estradiol (VAGIFEM, YUVAFEM) 10 MCG TABS vaginal tablet, Insert 1 tablet (10 mcg total) into the vagina 2 (two) times a week, Disp: 24 tablet, Rfl: 3  •  famotidine (PEPCID) 20 mg tablet, Take 1 tablet (20 mg total) by mouth 2 (two) times a day (Patient taking differently: Take 20 mg by mouth if needed), Disp: 180 tablet, Rfl: 3  •  folic acid (FOLVITE) 400 mcg tablet, Take 400 mcg by mouth daily (Patient not taking: Reported on 8/6/2024), Disp: , Rfl:   •  lubiprostone (AMITIZA) 24 mcg capsule, Take 1 capsule (24 mcg total) by mouth daily with breakfast, Disp: 30 capsule, Rfl: 3  •  Multiple Vitamins-Minerals (MULTIVITAMIN ADULTS 50+ PO), Take by mouth in the morning, Disp: , Rfl:   •  NON FORMULARY, Take by mouth in the morning Magnesium -calcium vitamin d3 (Patient not taking: Reported on 8/6/2024), Disp: , Rfl:   •  ondansetron (ZOFRAN) 4 mg tablet, Take 1 tablet (4 mg total) by mouth if needed for nausea or vomiting, Disp: , Rfl:   •  Progesterone 200 MG CAPS, Take 200 mg by mouth at bedtime, Disp: 90 capsule, Rfl: 3  •  SYRINGE-NEEDLE, DISP, 3 ML (Luer Lock Safety Syringes) 25G X 5/8\" 3 ML MISC, Use every 30 (thirty) days, Disp: 1 each, Rfl: 11        Whom besides the patient is providing clinical information about today's encounter?   NO ADDITIONAL HISTORIAN (patient alone provided history)    Physical Exam and Assessment/Plan by Diagnosis:    EPIDERMAL INCLUSION CYST    Physical Exam:  Anatomic Location Affected:  mid upper back  Morphological Description:  subcutaneous nodule with central punctum   Pertinent Positives:  Pertinent Negatives:    Additional History of Present " Condition:  patient reports spot has been present for approximately 1 year. She notes her  tries to squeeze it but only a little bit of something came out of it. She notes it is very itchy.    Assessment and Plan:  Based on a thorough discussion of this condition and the management approach to it (including a comprehensive discussion of the known risks, side effects and potential benefits of treatment), the patient (family) agrees to implement the following specific plan:  Schedule for punch excision on a Friday with [facility]    What are epidermal inclusion cysts?  Epidermal inclusion cysts are the most common, benign cutaneous cysts. There are many different names for epidermal inclusion cysts, including epidermoid cyst, epidermal cyst, infundibular cyst, inclusion cyst, and keratin cyst. These cysts can occur anywhere on the body and typically present as nodules directly underneath the skin. There is often a visible pore or opening in the center. The cysts are freely moveable and can range from a few millimeters to several centimeters in diameter. The center of epidermoid cysts almost always contains keratin, which has a cheesy appearance, and not sebum. They also do not originate from sebaceous glands. Therefore, epidermal inclusion cysts are not the same as sebaceous cysts.    Cysts may remain stable or progressively enlarge over time. There are no reliable predictive factors to tell if an epidermal inclusion cyst will enlarge, become inflamed, or remain quiescent. Infected cysts tend to become larger, turn red, and are more noticeable to the patient. There may be accompanying pain and discomfort.     What causes epidermal inclusion cysts?  Epidermal inclusion cysts often appear out of the blue and are not contagious. They are due to a proliferation of epidermal cells within the dermis and are more common in men than women. They occur more frequently in patients in their 20s to 40s. Epidermal inclusion  cysts by themselves are usually not inherited, but they can be hereditary in rare syndromes such as Negrete syndrome, nodular elastosis with cysts and comedones (Favre-Racouchot syndrome), and basal cell nevus syndrome (Gorlin syndrome). Elderly patients with chronic sun-damaged skin areas have a higher likelihood of developing epidermoid cysts. They often occur in areas where hair follicles have been inflamed or repeatedly irritated are more frequent in patients with acne vulgaris. In the  period, they are called milia.     Patients on BRAF inhibitors such as imiquimod and cyclosporine have a higher incidence of epidermoid cysts of the face.    How do we diagnose an epidermal inclusion cyst?  Epidermoid inclusion cysts are often diagnosed by history and physical exam. There is usually no need for biopsy prior to removal.  Radiographic and laboratory exams, such as ultrasound studies, are unnecessary and not typically ordered unless the practitioner suspects a genetic condition.    What is the treatment for an epidermal inclusion cyst?  Inflamed, uninfected epidermal inclusion cysts rarely resolve spontaneously without therapy or surgical intervention. Treatment is not emergent unless desired by the patient.     Definitive treatment is via surgical excision with walls intact. This method will prevent recurrence. This is best done when the cyst is not inflamed, to decrease the probability of rupture during surgery.   A local anesthetic will be injected around the cyst  A small incision is made in the skin overlying the cyst, and contents are expressed  The incision is repaired with sutures    Another option is to use a 4mm punch biopsy with cyst extraction through the defect.    Incision and drainage is often needed if the cyst is infected or inflamed. If there is surrounding cellulitis, oral antibiotic therapy may be necessary. The common agents used target methicillin sensitive Staphylococcal aureus and  methicillin resistant S aureus in areas of high prevalence.     Scribe Attestation    I,:  Waleska David MA am acting as a scribe while in the presence of the attending physician.:       I,:  Fabricio Villanueva DO personally performed the services described in this documentation    as scribed in my presence.:

## 2025-05-09 ENCOUNTER — PROCEDURE VISIT (OUTPATIENT)
Dept: DERMATOLOGY | Facility: CLINIC | Age: 55
End: 2025-05-09
Payer: COMMERCIAL

## 2025-05-09 VITALS — BODY MASS INDEX: 25.42 KG/M2 | TEMPERATURE: 96.8 F | WEIGHT: 139 LBS

## 2025-05-09 DIAGNOSIS — L72.0 EPIDERMAL INCLUSION CYST: Primary | ICD-10-CM

## 2025-05-09 PROCEDURE — 88342 IMHCHEM/IMCYTCHM 1ST ANTB: CPT | Performed by: STUDENT IN AN ORGANIZED HEALTH CARE EDUCATION/TRAINING PROGRAM

## 2025-05-09 PROCEDURE — 88305 TISSUE EXAM BY PATHOLOGIST: CPT | Performed by: STUDENT IN AN ORGANIZED HEALTH CARE EDUCATION/TRAINING PROGRAM

## 2025-05-09 PROCEDURE — 88341 IMHCHEM/IMCYTCHM EA ADD ANTB: CPT | Performed by: STUDENT IN AN ORGANIZED HEALTH CARE EDUCATION/TRAINING PROGRAM

## 2025-05-09 PROCEDURE — 11104 PUNCH BX SKIN SINGLE LESION: CPT | Performed by: DERMATOLOGY

## 2025-05-09 NOTE — PROGRESS NOTES
"[facility] Dermatology Clinic Note     Patient Name: Bhavna Timmons  Encounter Date: 5/9/25       Have you been cared for by a [facility] Dermatologist in the last 3 years and, if so, which description applies to you? Yes. I have been here within the last 3 years, and my medical history has NOT changed since that time. I am of child-bearing potential.     REVIEW OF SYSTEMS:  Have you recently had or currently have any of the following? No changes in my recent health.   PAST MEDICAL HISTORY:  Have you personally ever had or currently have any of the following?  If \"YES,\" then please provide more detail. No changes in my medical history.   HISTORY OF IMMUNOSUPPRESSION: Do you have a history of any of the following:  Systemic Immunosuppression such as Diabetes, Biologic or Immunotherapy, Chemotherapy, Organ Transplantation, Bone Marrow Transplantation or Prednisone?  No     Answering \"YES\" requires the addition of the dotphrase \"IMMUNOSUPPRESSED\" as the first diagnosis of the patient's visit.   FAMILY HISTORY:  Any \"first degree relatives\" (parent, brother, sister, or child) with the following?    No changes in my family's known health.   PATIENT EXPERIENCE:    Do you want the Dermatologist to perform a COMPLETE skin exam today including a clinical examination under the \"bra and underwear\" areas?  NO  If necessary, do we have your permission to call and leave a detailed message on your Preferred Phone number that includes your specific medical information?  Yes      Allergies   Allergen Reactions    Penicillin G Hives      Current Outpatient Medications:     Cholecalciferol (D3 High Potency) 50 MCG (2000 UT) CAPS, , Disp: , Rfl:     cyanocobalamin 1,000 mcg/mL, Inject 1 mL (1,000 mcg total) into a muscle every 30 (thirty) days, Disp: 1 mL, Rfl: 11    estradiol (Estrace) 1 mg tablet, Take 1 tablet (1 mg total) by mouth daily, Disp: 90 tablet, Rfl: 3    estradiol (VAGIFEM, YUVAFEM) 10 MCG TABS vaginal tablet, Insert 1 " "tablet (10 mcg total) into the vagina 2 (two) times a week, Disp: 24 tablet, Rfl: 3    famotidine (PEPCID) 20 mg tablet, Take 1 tablet (20 mg total) by mouth 2 (two) times a day (Patient taking differently: Take 20 mg by mouth if needed), Disp: 180 tablet, Rfl: 3    folic acid (FOLVITE) 400 mcg tablet, Take 400 mcg by mouth daily (Patient not taking: Reported on 8/6/2024), Disp: , Rfl:     lubiprostone (AMITIZA) 24 mcg capsule, Take 1 capsule (24 mcg total) by mouth daily with breakfast, Disp: 30 capsule, Rfl: 3    Multiple Vitamins-Minerals (MULTIVITAMIN ADULTS 50+ PO), Take by mouth in the morning, Disp: , Rfl:     NON FORMULARY, Take by mouth in the morning Magnesium -calcium vitamin d3 (Patient not taking: Reported on 8/6/2024), Disp: , Rfl:     ondansetron (ZOFRAN) 4 mg tablet, Take 1 tablet (4 mg total) by mouth if needed for nausea or vomiting, Disp: , Rfl:     Progesterone 200 MG CAPS, Take 200 mg by mouth at bedtime, Disp: 90 capsule, Rfl: 3    SYRINGE-NEEDLE, DISP, 3 ML (Luer Lock Safety Syringes) 25G X 5/8\" 3 ML MISC, Use every 30 (thirty) days, Disp: 1 each, Rfl: 11    tirzepatide (Zepbound) 2.5 mg/0.5 mL auto-injector, Inject 0.5 mL (2.5 mg total) under the skin once a week for 28 days, Disp: 2 mL, Rfl: 0        Whom besides the patient is providing clinical information about today's encounter?   NO ADDITIONAL HISTORIAN (patient alone provided history)    Physical Exam and Assessment/Plan by Diagnosis:    NEOPLASM OF UNCERTAIN BEHAVIOR OF SKIN    Physical Exam:  (Anatomic Location); (Size and Morphological Description); (Differential Diagnosis):  A: mid upper back; 0.4 cm x 0.3 cm papule with punctum; ddx. EIC  Pertinent Positives:  Pertinent Negatives:      Additional History of Present Condition:  patient reports spot has been present for approximately 1 year. She notes her  tries to squeeze it but only a little bit of something came out of it. She notes it is very itchy.     Assessment and " "Plan:  I have discussed with the patient that a sample of skin via a \"skin biopsy” would be potentially helpful to further make a specific diagnosis under the microscope.  Based on a thorough discussion of this condition and the management approach to it (including a comprehensive discussion of the known risks, side effects and potential benefits of treatment), the patient (family) agrees to implement the following specific plan:    Procedure:  Skin Biopsy.  After a thorough discussion of treatment options and risk/benefits/alternatives (including but not limited to local pain, scarring, dyspigmentation, blistering, possible superinfection, and inability to confirm a diagnosis via histopathology), verbal and written consent were obtained and portion of the rash was biopsied for tissue sample.  See below for consent that was obtained from patient and subsequent Procedure Note.     PROCEDURE NOTE:  PUNCH BIOPSY    Performing Physician:     Anatomic Location; Clinical Description with size (cm); Pre-Op Diagnosis:    A: mid upper back; 0.4 cm x 0.3 cm papule with punctum; ddx. EIC     Anesthesia: 1% xylocaine with epi       Topical anesthesia: None     Indications: To indicate diagnosis and management plan.    Procedure Details     Patient informed of the risks (including bleeding,scaring and infection) and benefits of the procedure explained. Verbal and written informed consent obtained. The area was prepped and draped in the usual fashion. Anesthesia was obtained with 1% lidocaine with epinephrine. The skin was then stretched perpendicular to the skin tension lines and a punch biopsy to an appropriate sampling depth was obtained with a 6 mm punch with a forceps and iris scissors.     Hemostasis was obtained with 4-0 Prolene x 2 sutures.     Complications:  None    Specimen has been sent for review by Dermatopathology.    Plan:  1. Instructed to keep the wound dry and covered for 24-48h and clean " thereafter.  2. Warning signs of infection were reviewed.    3. Recommended that the patient use acetaminophen as needed for pain  4. Sutures if any should be removed in 10-14 days    Standard post-procedure care has been explained and has been included in written form within the patient's copy of Informed Consent.      Scribe Attestation      I,:  Chika Lucas MA am acting as a scribe while in the presence of the attending physician.:       I,:  Virgen Duong MD personally performed the services described in this documentation    as scribed in my presence.:

## 2025-05-14 PROCEDURE — 88342 IMHCHEM/IMCYTCHM 1ST ANTB: CPT | Performed by: STUDENT IN AN ORGANIZED HEALTH CARE EDUCATION/TRAINING PROGRAM

## 2025-05-14 PROCEDURE — 88305 TISSUE EXAM BY PATHOLOGIST: CPT | Performed by: STUDENT IN AN ORGANIZED HEALTH CARE EDUCATION/TRAINING PROGRAM

## 2025-05-14 PROCEDURE — 88341 IMHCHEM/IMCYTCHM EA ADD ANTB: CPT | Performed by: STUDENT IN AN ORGANIZED HEALTH CARE EDUCATION/TRAINING PROGRAM

## 2025-05-15 ENCOUNTER — RESULTS FOLLOW-UP (OUTPATIENT)
Dept: DERMATOLOGY | Facility: CLINIC | Age: 55
End: 2025-05-15

## 2025-05-15 NOTE — RESULT ENCOUNTER NOTE
DERMATOPATHOLOGY RESULT NOTE    Results reviewed by ordering physician.  Called patient to personally discuss results. No answer, left voicemail with result.      Instructions for Clinical Derm Team:   (remember to route Result Note to appropriate staff):    None    Result & Plan by Specimen:    Specimen A: benign  Plan: reassured, benign, discussed possibility of recurrence    Tissue Exam: [id number]  Order: [id number]   Status: Final result       Dx: Epidermal inclusion cyst    Test Result Released: Yes (seen)    View Follow-Up Encounter      Component  Ref Range & Units (hover)    Case Report   Surgical Pathology Report                         Case: [id number]                                   Authorizing Provider:  Virgen Duong MD            Collected:           05/09/2025 1650               Ordering Location:     [facility]      Received:            05/09/2025 1650                                      [place]                                                                 Pathologist:           Catherine Gomez MD                                                           Specimen:    Skin, Other, A: mid upper back                                                            Final Diagnosis   A. Skin, mid upper back, punch biopsy:     FIBROUS HISTIOCYTOMA (DERMATOFIBROMA); extends focally to peripheral specimen margin.         Electronically signed by Catherine Gomez MD on 5/14/2025 at 1417 EDT

## 2025-05-22 DIAGNOSIS — K21.9 GASTROESOPHAGEAL REFLUX DISEASE WITHOUT ESOPHAGITIS: ICD-10-CM

## 2025-05-22 DIAGNOSIS — Z98.84 BARIATRIC SURGERY STATUS: Primary | ICD-10-CM

## 2025-05-22 DIAGNOSIS — E78.5 DYSLIPIDEMIA: ICD-10-CM

## 2025-05-22 RX ORDER — TIRZEPATIDE 2.5 MG/.5ML
2.5 INJECTION, SOLUTION SUBCUTANEOUS WEEKLY
Qty: 2 ML | Refills: 0 | Status: SHIPPED | OUTPATIENT
Start: 2025-05-22 | End: 2025-06-19

## 2025-06-03 ENCOUNTER — TELEPHONE (OUTPATIENT)
Age: 55
End: 2025-06-03

## 2025-06-03 NOTE — TELEPHONE ENCOUNTER
Spoke with pt regarding her labs and appointment. Pt aware that she has not completed her labs recently, and will have them completed prior to her visit. Pt is r/s to 8/11 at 1620 with Pippa in the [place] office.

## 2025-06-10 ENCOUNTER — TELEMEDICINE (OUTPATIENT)
Age: 55
End: 2025-06-10
Payer: COMMERCIAL

## 2025-06-10 DIAGNOSIS — R68.82 LOW LIBIDO: Primary | ICD-10-CM

## 2025-06-10 DIAGNOSIS — N95.1 MENOPAUSAL SYMPTOMS: ICD-10-CM

## 2025-06-10 PROBLEM — D50.0 IRON DEFICIENCY ANEMIA SECONDARY TO BLOOD LOSS (CHRONIC): Status: RESOLVED | Noted: 2022-02-25 | Resolved: 2025-06-10

## 2025-06-10 PROCEDURE — 99214 OFFICE O/P EST MOD 30 MIN: CPT | Performed by: OBSTETRICS & GYNECOLOGY

## 2025-06-11 NOTE — PROGRESS NOTES
"Virtual Regular Visit  Name: Bhavna Timmons      : 1970      MRN: [id number]  Encounter Provider: Mayela Martin MD  Encounter Date: 6/10/2025   Encounter department: [facility] [place] [facility]  :  Assessment & Plan  Low libido    Orders:    other medication, see sig,; Medication/product name: testosterone cream   Strength: 10 mg/ml  Sig (include dose, route, frequency): apply 0.5 ml to labia daily    Menopausal symptoms         1) She may take both estradiol and progesterone at night. If still night sweats, increase E2 to 1.5 mg daily.  2) Testosterone can easily be adjusted up in dose! She consents to trying it again, this time at 5 mg/ d daily with 3 month refill. Email with progress report in 2-3 months before stopping! She was also applying it to the abdomen instead of outer vagina/clitoris as prescribed.  I reminded her that it often takes a \" pharmacologic level\" which is much higher than a \" physiologic level\" for results, and that libido often takes 2-3 months to improve.   3) Instructions for medical management reviewed. Compliance encouraged for best results.   4) Titrate doses of above to effect as needed. Follow up adrenal lab and OV in one year.     This was a 30 minute visit with greater than 50% of time spent in face to face counseling and coordination of care    Bhavna returns for hormone consult follow up. I saw her last in  with complaints of menopausal symptoms, decreased libido, mood instability. Perimenopausal at the time.   Adrenal axis testing revealed very low testosterone and DHEAS. Offered testosterone cream albeit in lower dose to start 2 mg/d. Since then:  1) Contacted me in  with increased hot flash and sleep disturbance, now menopausal. Started on estradiol 1 mg/ progesterone 100 mg. Admits to non compliance with estradiol as she thought she had to take in morning, better with PG at night. As expected, symptoms have started to come back " lately.  2) Tried testosterone for 6 months, did not help, so stopped. Did not tell me this!  NO other change in health status or FHX updates since last visit.     Review of Systems   Constitutional: Negative.    Endocrine: Positive for heat intolerance.   Genitourinary:  Negative for vaginal pain.        Low libido   Musculoskeletal: Negative.    Neurological: Negative.    Psychiatric/Behavioral: Negative.           Physical Exam    Administrative Statements   Encounter provider Mayela Martin MD    The Patient is located at Home and in the following state in which I hold an active license [place].    The patient was identified by name and date of birth. Bhavna Timmons was informed that this is a telemedicine visit and that the visit is being conducted through the Epic Embedded platform. She agrees to proceed..  My office door was closed. No one else was in the room.  She acknowledged consent and understanding of privacy and security of the video platform. The patient has agreed to participate and understands they can discontinue the visit at any time.    I have spent a total time of 30 minutes in caring for this patient on the day of the visit/encounter including Instructions for management, Counseling / Coordination of care, Documenting in the medical record, Reviewing/placing orders in the medical record (including tests, medications, and/or procedures), and Obtaining or reviewing history  , not including the time spent for establishing the audio/video connection.

## 2025-06-25 ENCOUNTER — OFFICE VISIT (OUTPATIENT)
Age: 55
End: 2025-06-25
Payer: COMMERCIAL

## 2025-06-25 VITALS
WEIGHT: 146 LBS | DIASTOLIC BLOOD PRESSURE: 72 MMHG | HEIGHT: 62 IN | SYSTOLIC BLOOD PRESSURE: 108 MMHG | HEART RATE: 45 BPM | TEMPERATURE: 97.4 F | BODY MASS INDEX: 26.87 KG/M2

## 2025-06-25 DIAGNOSIS — K21.9 GASTROESOPHAGEAL REFLUX DISEASE WITHOUT ESOPHAGITIS: ICD-10-CM

## 2025-06-25 DIAGNOSIS — E78.5 DYSLIPIDEMIA: ICD-10-CM

## 2025-06-25 DIAGNOSIS — Z86.39 HISTORY OF OBESITY: ICD-10-CM

## 2025-06-25 DIAGNOSIS — Z98.84 BARIATRIC SURGERY STATUS: Primary | ICD-10-CM

## 2025-06-25 PROCEDURE — 99214 OFFICE O/P EST MOD 30 MIN: CPT | Performed by: PHYSICIAN ASSISTANT

## 2025-06-25 RX ORDER — TIRZEPATIDE 5 MG/.5ML
5 INJECTION, SOLUTION SUBCUTANEOUS WEEKLY
Qty: 6 ML | Refills: 0 | Status: SHIPPED | OUTPATIENT
Start: 2025-06-25 | End: 2025-09-17

## 2025-06-25 NOTE — PROGRESS NOTES
Assessment/Plan:  Bhavna was seen today for follow-up.    Diagnoses and all orders for this visit:    Bariatric surgery status  -     tirzepatide (Zepbound) 5 mg/0.5 mL auto-injector; Inject 0.5 mL (5 mg total) under the skin once a week    Gastroesophageal reflux disease without esophagitis  -     tirzepatide (Zepbound) 5 mg/0.5 mL auto-injector; Inject 0.5 mL (5 mg total) under the skin once a week    BMI 27.0-27.9,adult  -     tirzepatide (Zepbound) 5 mg/0.5 mL auto-injector; Inject 0.5 mL (5 mg total) under the skin once a week    Dyslipidemia  -     tirzepatide (Zepbound) 5 mg/0.5 mL auto-injector; Inject 0.5 mL (5 mg total) under the skin once a week    History of obesity  -     tirzepatide (Zepbound) 5 mg/0.5 mL auto-injector; Inject 0.5 mL (5 mg total) under the skin once a week         No problem-specific Assessment & Plan notes found for this encounter.    - Labs reviewed: As below.      General Recommendations:  Nutrition:  Eat breakfast daily.  Do not skip meals.     Food log (ie.) [email or web address], sparkpeople.com, loseit.com, calorieking.com, etc.    Practice mindful eating.  Be sure to set aside time to eat, eat slowly, and savor your food.    Hydration:    At least 64oz of water daily.  No sugar sweetened beverages.  No juice (eat the fruit instead).    Exercise:  Studies have shown that the ideal exercise goal is somewhere between 150 to 300 minutes of moderate intensity exercise a week.  Start with exercising 10 minutes every other day and gradually increase physical activity with a goal of at least 150 minutes of moderate intensity exercise a week, divided over at least 3 days a week.  An example of this would be exercising 30 minutes a day, 5 days a week.  Resistance training can increase muscle mass and increase our resting metabolic rate.   FULL BODY resistance training is recommended 2-3 times a week.  Do not do this on consecutive days to allow for muscle recovery.    Aim for a bare  minimum 5000 steps, even on days you do not exercise.    Monitoring:   Weigh yourself daily.  If this causes undue stress, then just weigh yourself once a week.  Weigh yourself the same time of the day with the same amount of clothing on.  Preferably this should be done after waking up, before you eat, and with no clothing or minimal clothing on.    Specific Goals:  Food log (ie.) [email or web address],[email or web address],etc.   No sugary beverages. At least 64oz of water daily.  Goal protein intake of 60-80 grams per day    Calorie goal:  refer to dietitian for meal planning (Provided with meal plan to follow).    Return visit:  4 months  1) RX Zepbound 5mg Q 2 weeks; maintenance protocol.     GLP-1 Managing Side Effects Tips:  - focus on small frequent meals  - moderate sugar and fat intake  - change the injection site (abdomen --> thigh--> back of arm)  - eat protein, crackers, mints and shobha-based drinks  - nighttime injections  - drink plenty of water  - consider fiber supplements like miralax    Tips for Nausea:  - Don't drink and eat simultaneously: separate fluids 30-60 minutes before and after meals when experiencing nausea or if you notice you become full quickly  - Choose mild smelling foods- strong smells can exacerbate nausea  - Shobha and peppermint- consider drinking shobha or peppermint tea, which can help alleviate symptoms  - Eat- don't skip meals, if you have nausea, it is understandable that you may not feel like eating. However, skipping meals is generally not recommended as this can lead to lower blood sugar and fatigue. Furthermore, it is essential to consume adequate protein during weight loss. You can opt for a protein shake, a meal replacement supplement, bone broth or soup.     Tips for Constipation:   - Drink plenty of water  - Eat food with a high fiber content: increase your fiber intake by consuming these types of foods:   Eat more whole grain products like  barley, oats, farro, brown or wild rice and quinoa.    Look for choices with 100% whole wheat, rye, oats or bran as the first ingredient listed    Check nutrition fact labels and choose products with 4gm of dietary fiber or more per serving   Add more beans to your diet   Eat more fresh fruits and vegetables with skins on them    Exercise- increase physical activity as it helps stimulate gastric mobility, which moves stool through the digestive tract  Zepbound Instructions:    .  - Please message me when you have 2 pens left from the prescription so there are no lapses in treatment.  - Visit [email or web address].com for further information/injection instructions.   - Take precautions to avoid dehydration. Aim for 64-80 oz of fluids/day  -Stop eating before you feel full  -Instructed  to administer a missed dose as soon as possible within 4 days. If more than 4 days have passed, skip the missed dose, administer the next dose on the regularly scheduled day, and resume the regular once-weekly dosing schedule  -Please eat small frequent meals to help reduce nausea. Avoid excessively fatty meals fried foods. Lemon water and saltine crackers may help with this.   - Side effects of Zepbound discussed: nausea, vomiting, diarrhea, and constipation. If you experience fever, nausea/vomiting, and pain radiating to your back this may be a sign of pancreatitis. Please go to the emergency room if this occurs.  - Consider OTC bowel regimen including stool softeners, fiber supplements to regularize bowel movements while on medication. MiraLAX can be used if needed  - - If on oral birth control a 2nd method of birth control is recommended during the 1st 8 weeks of therapy and for 4 weeks after any dosage change.   - Patient understands the side effects of the medication and proper administration. Patient agrees with the treatment plan and all questions were answered.    Subcutaneous injection:  Inject subQ into the thigh, abdomen, or upper arm;  rotate injection sites.  Administer at any time of day, with or without meals.  Administer separately from insulin (do not mix the products); may inject both medications in the same body region but not adjacent to each other.  The day of the weekly administration can be changed as long as the time between the 2 doses is at least 3 days (72 hours)  Administer a missed dose as soon as possible within 4 days (96 hours) of missed dose; if more than 4 days have passed, skip the missed dose and administer next dose on regularly scheduled day and resume regular once weekly dosing schedule      2) Nutrition RX:  - start tracking intake  - focus on protein- goal is 30 grams per meal; 90 grams per day  - focus on increasing fiber first by increasing vegetable servings per day  - do not skip breakfast and goal water intake 64 oz daily    Physical Activity RX:  - Goal is 150-200 mins of activity weekly.  Try to include at least 2 strength training sessions; increasing lean body mass will really help you to lose weight and maintain weight loss.      Total time spent reviewing chart, interviewing patient, examining patient, discussing plan, answering all questions, and documentin min.       ______________________________________________________________________        Subjective:   Chief Complaint   Patient presents with    Follow-up     2 MO MW F/U----Waist:       HPI: Bhavna Timmons  is a 55 y.o. female with history of bariatric surgery, dyslipidemia, impaired fasting glucose, GERD and excess weight, here to pursue weight loss management.  Previous notes and records have been reviewed.    Patient returns for follow up; she has been on Zepbound 2.5mg monthly and is no longer having any GI side effects.  She was only injecting once per month but now feeling a bit more appetite at night and cravings, however upon further questioning she could increase her protein and strength training.      but was having nausea and diarrhea  on this dose and appetite was pretty suppressed where she was not too interested in eating.     Physical Activity:   Walking and stretching; 30 mins daily   Squats and lunges   Gardening, watching her granddaughter and playing     Nutrition:  Focusing on protein and fiber   Chicken, salad  Shrimp   B- oatmeal, boiled eggs  Cottage cheese and fruit   Almonds       Weight History:      Patient has gained back 12% of her TBW back over the past 6 months.  Hunger and food noise is coming back.     Physical activity:  Patient has been walking 4-5 days per week.  She has some home equipment that she can use as well.      Nutrition/ Food recall  B- boiled eggs, bagel, oatmeal   L - leftovers (protein (chicken, shrimp), vegetable) starch   D- cooks at home protein (chicken baked or broiled), salad, little vegetables   S- hungry so will snack on chips, pastries     Hydration: 40-50 oz water, no soda no juice       HPI  Wt Readings from Last 20 Encounters:   05/09/25 63 kg (139 lb)   04/21/25 60.3 kg (133 lb)   04/16/25 60.4 kg (133 lb 3.2 oz)   02/17/25 65.5 kg (144 lb 6.5 oz)   10/16/24 57.4 kg (126 lb 9.6 oz)   08/27/24 60.3 kg (133 lb)   08/13/24 61.7 kg (136 lb)   08/06/24 61.7 kg (136 lb)   07/23/24 61.8 kg (136 lb 3.2 oz)   07/19/24 62.5 kg (137 lb 12.8 oz)   04/10/24 64.6 kg (142 lb 6.4 oz)   03/04/24 67 kg (147 lb 12.8 oz)   02/09/24 69.1 kg (152 lb 6.4 oz)   01/25/24 69.9 kg (154 lb)   11/29/23 69.4 kg (153 lb)   11/21/23 68.5 kg (151 lb)   10/02/23 69.1 kg (152 lb 5.4 oz)   08/23/23 69.1 kg (152 lb 6.4 oz)   07/24/23 70.3 kg (155 lb)   06/27/23 68.8 kg (151 lb 9.6 oz)     Excess Weight:    What has been tried: Diet and Exercise, Physician Supervised Weight Loss Program, and Commercial Weight Loss Programs-ie. Weight Watchers, [employer], Nutrisystem, etc.        Hunger/Cravings: hunger at night   Dining out: rare  Hydration: water  Alcohol:  Smoking:  Exercise: walking   Weight Monitoring:      Past Medical  "History:   Diagnosis Date    Abnormal ECG     Abnormal stress ECG 04/02/2021    Anemia     Callus     Chest pain due to GERD 02/17/2021    HLD (hyperlipidemia)     Other hyperlipidemia 01/12/2022    Palpitations 02/17/2021    Sarcoidosis      Patient denies personal and family history of  pancreatitis, thyroid cancer, MEN-2 tumors.  Denies any hx of glaucoma, seizures, kidney stones, gallstones.  Denies Hx of CAD, PAD, palpitations, arrhythmia.   Denies uncontrolled anxiety or depression, suicidal behavior or thinking , insomnia or sleep disturbance.   Past Surgical History:   Procedure Laterality Date    ANKLE FRACTURE SURGERY  7/2/2018    ANKLE SURGERY Right 2018    COLONOSCOPY  2018    GASTRECTOMY SLEEVE LAPAROSCOPIC  july 8th 2021    KNEE ARTHROSCOPY W/ MENISCAL REPAIR Left 2013    KNEE SURGERY  8/6/2013    SLEEVE GASTROPLASTY  7/2021    TUBAL LIGATION  1997    UPPER GASTROINTESTINAL ENDOSCOPY       The following portions of the patient's history were reviewed and updated as appropriate: allergies, current medications, past family history, past medical history, past social history, past surgical history, and problem list.    Review Of Systems:  Review of Systems   Constitutional:  Positive for fatigue.   HENT: Negative.     Eyes: Negative.    Gastrointestinal:  Negative for constipation, diarrhea and nausea.   Genitourinary: Negative.    Musculoskeletal: Negative.    Skin: Negative.    Allergic/Immunologic: Negative.    Neurological: Negative.  Negative for headaches.   Hematological: Negative.    Psychiatric/Behavioral: Negative.         Objective:  /72   Pulse (!) 45   Temp (!) 97.4 °F (36.3 °C) (Tympanic)   Ht 5' 2\" (1.575 m)   BMI 25.42 kg/m²   Physical Exam  Vitals reviewed.   Constitutional:       Appearance: She is obese.   HENT:      Head: Normocephalic.      Mouth/Throat:      Mouth: Mucous membranes are moist.     Eyes:      Pupils: Pupils are equal, round, and reactive to light. "       Cardiovascular:      Rate and Rhythm: Normal rate and regular rhythm.   Pulmonary:      Effort: Pulmonary effort is normal.      Breath sounds: Normal breath sounds.   Abdominal:      General: Bowel sounds are normal.      Palpations: Abdomen is soft.     Musculoskeletal:         General: Normal range of motion.      Cervical back: Normal range of motion.     Skin:     General: Skin is warm and dry.     Neurological:      General: No focal deficit present.      Mental Status: She is alert.     Psychiatric:         Mood and Affect: Mood normal.         Thought Content: Thought content normal.         Judgment: Judgment normal.         Labs and Imaging  Recent labs and imaging have been personally reviewed.  Lab Results   Component Value Date    WBC 5.24 07/23/2024    HGB 15.9 (H) 07/23/2024    HCT 49.5 (H) 07/23/2024    MCV 99 (H) 07/23/2024     07/23/2024     Lab Results   Component Value Date    SODIUM 141 07/23/2024    K 4.2 07/23/2024     07/23/2024    CO2 23 07/23/2024    AGAP 13 07/23/2024    BUN 18 07/23/2024    CREATININE 1.03 07/23/2024    GLUC 91 04/09/2021    GLUF 73 07/23/2024    CALCIUM 10.0 07/23/2024    AST 16 07/23/2024    ALT 17 07/23/2024    ALKPHOS 74 07/23/2024    TP 7.5 07/23/2024    TBILI 0.63 07/23/2024    EGFR 61 07/23/2024     Lab Results   Component Value Date    HGBA1C 5.3 07/23/2024     Lab Results   Component Value Date    [id number] 1.693 01/17/2024     Lab Results   Component Value Date    CHOLESTEROL 228 (H) 07/23/2024     Lab Results   Component Value Date    HDL 65 07/23/2024     Lab Results   Component Value Date    TRIG 111 07/23/2024     Lab Results   Component Value Date    LDLCALC 141 (H) 07/23/2024

## 2025-06-28 ENCOUNTER — APPOINTMENT (OUTPATIENT)
Dept: LAB | Facility: CLINIC | Age: 55
End: 2025-06-28
Attending: PREVENTIVE MEDICINE
Payer: COMMERCIAL

## 2025-06-28 ENCOUNTER — APPOINTMENT (OUTPATIENT)
Dept: LAB | Facility: CLINIC | Age: 55
End: 2025-06-28
Attending: NURSE PRACTITIONER
Payer: COMMERCIAL

## 2025-06-28 DIAGNOSIS — E53.8 B12 DEFICIENCY: ICD-10-CM

## 2025-06-28 DIAGNOSIS — E55.9 VITAMIN D DEFICIENCY: ICD-10-CM

## 2025-06-28 DIAGNOSIS — Z98.84 BARIATRIC SURGERY STATUS: ICD-10-CM

## 2025-06-28 DIAGNOSIS — E67.0 HIGH VITAMIN A LEVEL: ICD-10-CM

## 2025-06-28 DIAGNOSIS — Z98.84 STATUS POST BARIATRIC SURGERY: ICD-10-CM

## 2025-06-28 DIAGNOSIS — K90.9 MALABSORPTION SYNDROME: ICD-10-CM

## 2025-06-28 DIAGNOSIS — D50.0 IRON DEFICIENCY ANEMIA SECONDARY TO BLOOD LOSS (CHRONIC): ICD-10-CM

## 2025-06-28 DIAGNOSIS — Z00.8 HEALTH EXAMINATION IN POPULATION SURVEY: ICD-10-CM

## 2025-06-28 LAB
25(OH)D3 SERPL-MCNC: 20.1 NG/ML (ref 30–100)
BASOPHILS # BLD AUTO: 0.02 THOUSANDS/ÂΜL (ref 0–0.1)
BASOPHILS NFR BLD AUTO: 0 % (ref 0–1)
CHOLEST SERPL-MCNC: 156 MG/DL (ref ?–200)
EOSINOPHIL # BLD AUTO: 0.05 THOUSAND/ÂΜL (ref 0–0.61)
EOSINOPHIL NFR BLD AUTO: 1 % (ref 0–6)
ERYTHROCYTE [DISTWIDTH] IN BLOOD BY AUTOMATED COUNT: 14.4 % (ref 11.6–15.1)
EST. AVERAGE GLUCOSE BLD GHB EST-MCNC: 105 MG/DL
FERRITIN SERPL-MCNC: 81 NG/ML (ref 30–307)
HBA1C MFR BLD: 5.3 %
HCT VFR BLD AUTO: 44.7 % (ref 34.8–46.1)
HDLC SERPL-MCNC: 57 MG/DL
HGB BLD-MCNC: 14.3 G/DL (ref 11.5–15.4)
IMM GRANULOCYTES # BLD AUTO: 0.02 THOUSAND/UL (ref 0–0.2)
IMM GRANULOCYTES NFR BLD AUTO: 0 % (ref 0–2)
LDLC SERPL CALC-MCNC: 77 MG/DL (ref 0–100)
LYMPHOCYTES # BLD AUTO: 1.86 THOUSANDS/ÂΜL (ref 0.6–4.47)
LYMPHOCYTES NFR BLD AUTO: 24 % (ref 14–44)
MCH RBC QN AUTO: 31.7 PG (ref 26.8–34.3)
MCHC RBC AUTO-ENTMCNC: 32 G/DL (ref 31.4–37.4)
MCV RBC AUTO: 99 FL (ref 82–98)
MONOCYTES # BLD AUTO: 0.51 THOUSAND/ÂΜL (ref 0.17–1.22)
MONOCYTES NFR BLD AUTO: 7 % (ref 4–12)
NEUTROPHILS # BLD AUTO: 5.15 THOUSANDS/ÂΜL (ref 1.85–7.62)
NEUTS SEG NFR BLD AUTO: 68 % (ref 43–75)
NONHDLC SERPL-MCNC: 99 MG/DL
NRBC BLD AUTO-RTO: 0 /100 WBCS
PLATELET # BLD AUTO: 159 THOUSANDS/UL (ref 149–390)
PMV BLD AUTO: 12.9 FL (ref 8.9–12.7)
RBC # BLD AUTO: 4.51 MILLION/UL (ref 3.81–5.12)
TRIGL SERPL-MCNC: 110 MG/DL (ref ?–150)
VIT B12 SERPL-MCNC: 310 PG/ML (ref 180–914)
WBC # BLD AUTO: 7.61 THOUSAND/UL (ref 4.31–10.16)

## 2025-06-28 PROCEDURE — 82607 VITAMIN B-12: CPT

## 2025-06-28 PROCEDURE — 82306 VITAMIN D 25 HYDROXY: CPT

## 2025-06-28 PROCEDURE — 83036 HEMOGLOBIN GLYCOSYLATED A1C: CPT

## 2025-06-28 PROCEDURE — 84590 ASSAY OF VITAMIN A: CPT

## 2025-06-28 PROCEDURE — 82728 ASSAY OF FERRITIN: CPT

## 2025-06-28 PROCEDURE — 80061 LIPID PANEL: CPT

## 2025-06-28 PROCEDURE — 85025 COMPLETE CBC W/AUTO DIFF WBC: CPT

## 2025-06-28 PROCEDURE — 36415 COLL VENOUS BLD VENIPUNCTURE: CPT

## 2025-07-01 LAB — VIT A SERPL-MCNC: 44.2 UG/DL (ref 20.1–62)

## 2025-07-08 DIAGNOSIS — D50.8 OTHER IRON DEFICIENCY ANEMIA: ICD-10-CM

## 2025-07-08 DIAGNOSIS — E53.8 B12 DEFICIENCY: Primary | ICD-10-CM

## 2025-08-07 ENCOUNTER — OFFICE VISIT (OUTPATIENT)
Age: 55
End: 2025-08-07
Payer: COMMERCIAL

## 2025-08-07 VITALS
SYSTOLIC BLOOD PRESSURE: 124 MMHG | HEART RATE: 52 BPM | BODY MASS INDEX: 25.65 KG/M2 | HEIGHT: 62 IN | WEIGHT: 139.4 LBS | OXYGEN SATURATION: 99 % | DIASTOLIC BLOOD PRESSURE: 68 MMHG | TEMPERATURE: 98.3 F

## 2025-08-07 DIAGNOSIS — D86.9 SARCOIDOSIS: ICD-10-CM

## 2025-08-07 DIAGNOSIS — E66.3 OVERWEIGHT: ICD-10-CM

## 2025-08-07 DIAGNOSIS — K59.04 CHRONIC IDIOPATHIC CONSTIPATION: ICD-10-CM

## 2025-08-07 DIAGNOSIS — Z23 ENCOUNTER FOR IMMUNIZATION: ICD-10-CM

## 2025-08-07 DIAGNOSIS — Z12.2 SCREENING FOR LUNG CANCER: ICD-10-CM

## 2025-08-07 DIAGNOSIS — Z00.00 ANNUAL PHYSICAL EXAM: Primary | ICD-10-CM

## 2025-08-07 DIAGNOSIS — F17.211 NICOTINE DEPENDENCE, CIGARETTES, IN REMISSION: ICD-10-CM

## 2025-08-07 PROCEDURE — 90471 IMMUNIZATION ADMIN: CPT

## 2025-08-07 PROCEDURE — 90677 PCV20 VACCINE IM: CPT

## 2025-08-07 PROCEDURE — 90715 TDAP VACCINE 7 YRS/> IM: CPT

## 2025-08-07 PROCEDURE — 99396 PREV VISIT EST AGE 40-64: CPT

## 2025-08-07 PROCEDURE — 90472 IMMUNIZATION ADMIN EACH ADD: CPT

## 2025-08-21 ENCOUNTER — HOSPITAL ENCOUNTER (OUTPATIENT)
Dept: MAMMOGRAPHY | Facility: MEDICAL CENTER | Age: 55
Discharge: HOME | End: 2025-08-21
Attending: INTERNAL MEDICINE
Payer: COMMERCIAL

## 2025-08-21 VITALS — BODY MASS INDEX: 25.65 KG/M2 | WEIGHT: 139.4 LBS | HEIGHT: 62 IN

## 2025-08-21 DIAGNOSIS — Z12.31 ENCOUNTER FOR SCREENING MAMMOGRAM FOR MALIGNANT NEOPLASM OF BREAST: ICD-10-CM

## 2025-08-21 PROCEDURE — 77063 BREAST TOMOSYNTHESIS BI: CPT

## 2025-08-21 PROCEDURE — 77067 SCR MAMMO BI INCL CAD: CPT
